# Patient Record
Sex: FEMALE | Race: WHITE | NOT HISPANIC OR LATINO | Employment: FULL TIME | URBAN - METROPOLITAN AREA
[De-identification: names, ages, dates, MRNs, and addresses within clinical notes are randomized per-mention and may not be internally consistent; named-entity substitution may affect disease eponyms.]

---

## 2017-01-09 NOTE — PROGRESS NOTES
"  SUBJECTIVE:                                                    Shannon Olivarez is a 31 year old female who presents to clinic today for the following health issues:    ESTABLISH CARE     HPI    Medication Followup of Oxybutnin    Taking Medication as prescribed: yes    Side Effects:  constipation    Medication Helping Symptoms:  yes       Problem list and histories reviewed & adjusted, as indicated.  Additional history: as documented    Problem list, Medication list, Allergies, and Medical/Social/Surgical histories reviewed in EPIC and updated as appropriate.    ROS:  Constitutional, HEENT, cardiovascular, pulmonary, gi and gu systems are negative, except as otherwise noted.    OBJECTIVE:                                                    BP 96/62 mmHg  Pulse 80  Temp(Src) 98.3  F (36.8  C) (Oral)  Resp 16  Ht 5' 5.47\" (1.663 m)  Wt 130 lb (58.968 kg)  BMI 21.32 kg/m2  SpO2 97%  Body mass index is 21.32 kg/(m^2).  GENERAL APPEARANCE: healthy, alert and no distress  EYES: Eyes grossly normal to inspection, PERRLA, conjunctivae and sclerae without injection or discharge, EOM intact   MS: No musculoskeletal defects are noted and gait is age appropriate without ataxia   SKIN: No suspicious lesions or rashes, hydration status appears adeuqate with normal skin turgor   PSYCH: Alert and oriented x3; speech- coherent , normal rate and volume; able to articulate logical thoughts, able to abstract reason, no tangential thoughts, no hallucinations or delusions, mentation appears normal, Mood is euthymic. Affect is appropriate for this mood state and bright. Thought content is free of suicidal ideation, hallucinations, and delusions. Dress is adequate and upkept. Eye contact is good during conversation.       Diagnostic Test Results:  none      ASSESSMENT/PLAN:                                                        ICD-10-CM    1. Urinary frequency R35.0 oxybutynin (DITROPAN-XL) 5 MG 24 hr tablet   2. Seasonal allergic " rhinitis due to pollen J30.1 montelukast (SINGULAIR) 10 MG tablet     - Both stable   - Reviewed medication use and side effects  - Will plan for recheck in 1 year with physical, discussed can do this with GYN or with Family Practice     The patient indicates understanding of these issues and agrees with the plan.    Follow up: 1 year       Naila Kc PA-C  [facility]

## 2017-01-12 ENCOUNTER — OFFICE VISIT (OUTPATIENT)
Dept: FAMILY MEDICINE | Facility: OTHER | Age: 32
End: 2017-01-12
Payer: COMMERCIAL

## 2017-01-12 VITALS
OXYGEN SATURATION: 97 % | DIASTOLIC BLOOD PRESSURE: 62 MMHG | HEART RATE: 80 BPM | SYSTOLIC BLOOD PRESSURE: 96 MMHG | HEIGHT: 65 IN | TEMPERATURE: 98.3 F | BODY MASS INDEX: 21.66 KG/M2 | WEIGHT: 130 LBS | RESPIRATION RATE: 16 BRPM

## 2017-01-12 DIAGNOSIS — R35.0 URINARY FREQUENCY: Primary | ICD-10-CM

## 2017-01-12 DIAGNOSIS — J30.1 SEASONAL ALLERGIC RHINITIS DUE TO POLLEN: ICD-10-CM

## 2017-01-12 PROCEDURE — 99214 OFFICE O/P EST MOD 30 MIN: CPT | Performed by: PHYSICIAN ASSISTANT

## 2017-01-12 RX ORDER — MONTELUKAST SODIUM 10 MG/1
10 TABLET ORAL AT BEDTIME
Qty: 90 TABLET | Refills: 3 | Status: SHIPPED | OUTPATIENT
Start: 2017-01-12 | End: 2017-12-07

## 2017-01-12 RX ORDER — OXYBUTYNIN CHLORIDE 5 MG/1
5 TABLET, EXTENDED RELEASE ORAL DAILY
Qty: 90 TABLET | Refills: 3 | Status: SHIPPED | OUTPATIENT
Start: 2017-01-12 | End: 2017-12-07

## 2017-01-12 ASSESSMENT — PAIN SCALES - GENERAL: PAINLEVEL: NO PAIN (0)

## 2017-01-12 NOTE — NURSING NOTE
"Chief Complaint   Patient presents with     Recheck Medication     oxybutnin     Panel Management     Ana, height       Initial BP 96/62 mmHg  Pulse 80  Temp(Src) 98.3  F (36.8  C) (Oral)  Resp 16  Ht 5' 5.47\" (1.663 m)  Wt 130 lb (58.968 kg)  BMI 21.32 kg/m2  SpO2 97% Estimated body mass index is 21.32 kg/(m^2) as calculated from the following:    Height as of this encounter: 5' 5.47\" (1.663 m).    Weight as of this encounter: 130 lb (58.968 kg).  BP completed using cuff size: regular  "

## 2017-03-23 ENCOUNTER — OFFICE VISIT (OUTPATIENT)
Dept: OBGYN | Facility: OTHER | Age: 32
End: 2017-03-23
Payer: COMMERCIAL

## 2017-03-23 VITALS
HEART RATE: 80 BPM | WEIGHT: 127.75 LBS | DIASTOLIC BLOOD PRESSURE: 82 MMHG | BODY MASS INDEX: 20.95 KG/M2 | SYSTOLIC BLOOD PRESSURE: 114 MMHG | RESPIRATION RATE: 16 BRPM

## 2017-03-23 DIAGNOSIS — N89.8 VAGINAL ITCHING: Primary | ICD-10-CM

## 2017-03-23 PROCEDURE — 99213 OFFICE O/P EST LOW 20 MIN: CPT | Performed by: ADVANCED PRACTICE MIDWIFE

## 2017-03-23 RX ORDER — FLUCONAZOLE 150 MG/1
150 TABLET ORAL
Qty: 3 TABLET | Refills: 0 | Status: SHIPPED | OUTPATIENT
Start: 2017-03-23 | End: 2017-06-22

## 2017-03-23 ASSESSMENT — PAIN SCALES - GENERAL: PAINLEVEL: SEVERE PAIN (6)

## 2017-03-23 NOTE — PROGRESS NOTES
Shannon Olivarez is a 31 year old who presents to the clinic for evaluation of vaginal changes      Vaginal Symptoms      Onset  For approx 1 month Duration: consistent. number of episodes of vaginitis in the past year 0    Description  Burning intravaginally and pain with intercourse x1 on Sunday    Intensity:  moderate    Accompanying signs and symptoms (fever/dysuria/abdominal or back pain): None    History  Sexually active: yes, single partner, contraception - oral contraceptives (combined)  Currently pregnant: no  Possibility of pregnancy: No  Recent antibiotic use: no  Diabetes: no  Precipitating or alleviating factors:None    Therapies tried and outcome: Vagisil has used since Monday until yesterday  Outcome: unchanged         Histories reviewed and updated  Past Medical History:   Diagnosis Date     Acne clear    sees derm Dr. Raygoza     Allergic rhinitis, cause unspecified     dr. joel GIBSON (allergic rhinitis)      CARDIOVASCULAR SCREENING; LDL GOAL LESS THAN 160 10/31/2010     High cholesterol      Hypertension 10/9/14    High blood pressure again noted at Urgent Care 12/13/14     Melanoma of scalp or neck (H)      Spina bifida (H)     seen on xray when in college     Past Surgical History:   Procedure Laterality Date     NO HISTORY OF SURGERY       Social History     Social History     Marital status:      Spouse name: N/A     Number of children: N/A     Years of education: N/A     Occupational History     Not on file.     Social History Main Topics     Smoking status: Never Smoker     Smokeless tobacco: Never Used     Alcohol use Yes     Drug use: No     Sexual activity: Yes     Partners: Male     Birth control/ protection: Pill     Other Topics Concern     Parent/Sibling W/ Cabg, Mi Or Angioplasty Before 65f 55m? No     Social History Narrative     Family History   Problem Relation Age of Onset     Allergies Mother      Asthma Mother      Allergies Father      Prostate Cancer Father       CANCER Maternal Grandfather      skin     HEART DISEASE Maternal Grandfather      Cardiovascular Maternal Grandfather      Lipids Maternal Grandfather      Other Cancer Maternal Grandfather      Lymphoma     Unknown/Adopted Paternal Grandmother      Unknown/Adopted Paternal Grandfather      Asthma Brother      Allergies Brother      Hypertension Maternal Grandmother      CEREBROVASCULAR DISEASE Maternal Grandmother      Breast Cancer Other      Paternal Aunt     Breast Cancer Other      Paternal Aunt     MENTAL ILLNESS Other      Maternal Uncle - Schizophrenia     Asthma Brother            CONSTITUTIONAL: Healthy, alert, in no apparent distress.  GI: NEGATIVE  : See HPI  MUSCULOSKELETAL: NEGATIVE  INTEGUMENTARY/SKIN: NEGATIVE  ENDOCRINE: NEGATIVE  PSYCHIATRIC: NEGATIVE    EXAM:  /82 (Cuff Size: Adult Regular)  Pulse 80  Resp 16  Wt 127 lb 12 oz (57.9 kg)  BMI 20.95 kg/m2  Patient appears well, vital signs normal.   Abdomen normal, soft without tenderness, guarding, mass or organomegaly.  No inguinal adenopathy or CVA tenderness.    : PELVIC EXAM:  Vulva: No external lesions, normal hair distribution, no adenopathy, BUS WNL  Vagina: Moist, pink, no abnormal discharge, well rugated, no lesions  Cervix: smooth, pink, no visible lesions, neg CMT  Rectal exam: deferred    WET PREP: Not done  Due to use recently of vagisil   GC and Chlamydia offered: Not Done    ASSESSMENT:     (L29.8) Vaginal itching  (primary encounter diagnosis)  Plan: fluconazole (DIFLUCAN) 150 MG tablet          PLAN:  Discussed that signs/symptoms probable indicative of yeast infection. Advised patient if signs/symptoms do not improve after treatment to return to clinic. Pt educated on Vagisil use and possibility of contact dermatitis.   STD prevention discussed.   Birth control method reviewed.  Abstain from intercourse for duration of treatment.  Return if symptoms do not resolve as anticipated.      Scribe Disclosure:   Magda OSHEA  SUGEY Shafer, am serving as a scribe; to document services personally performed by LIONEL Somers CNM  - -based on data collection and the provider's statements to me.     Provider Disclosure:  I agree with above History, Review of Systems, Physical exam and Plan.  I have reviewed the content of the documentation and have edited it as needed. I have personally performed the services documented here and the documentation accurately represents those services and the decisions I have made.      Electronically signed by:  LIONEL Somers CNM'

## 2017-03-23 NOTE — MR AVS SNAPSHOT
After Visit Summary   3/23/2017    Shannon Olivarez    MRN: [id number]           Patient Information     Date Of Birth          1985        Visit Information        Provider Department      3/23/2017 12:00 PM Cely Lopez APRN [facility]        Today's Diagnoses     Vaginal itching    -  1      Care Instructions    Here is a list of suggestions that may help eliminate or prevent vaginal infections or reduce their recurrence.  Many of these suggestions:   1. boosting your immune system  2. changing the vaginal environment to a more acidic state   3. increasing the good healthy bacteria    Read through them and try the ones that seem to fit you and your life style.    Soak in a warm bath tub, no soap, no bubble bath and no oils.  Add one cup vinegar or lemon juice to bath water once in a while,     Keep a water bottle with a squirt top in your bathroom, fill with warm water and use as a spray after wiping, then pat dry.  You may add 1-3 TBS of white vinegar to help maintain an acidic environment.    Wear cotton underwear; loose pants or skirts, avoid pantyhose and thong underwear.    Try to avoid wearing underware to bed so that your vaginal area can breathe and stay drier.    Keep vaginal area as dry as possible so don't sit for long periods of time in workout clothing or wet bathing suits.     Consider using either male or female condoms or asking your partner not to ejaculate in your vagina.    To boost your immune system consider the followin. Sleep:7-8 hours a night  2. Fluids: get a minimum of 8-10 glasses of water a day  3. Foods: try reducing processed foods in your diet and add whole grains, raw vegetables, fruit, and yogurt that contains active culture or kefir  4. Consider taking a vitamin B complex tablet, sometimes called stress tabs, Vitamin D 1000mg a day, or cranberry tablets.    5.  Consider the stress in your life and try to reduce it through yoga or  meditation.    Decrease daily intake of refined sugars, honey, red meat and alcohol    At each meal drink 1tsp apple cider vinegar and 1 tsp honey in   cup warm water    Consider probiotic tablets to restore normal bacteria back into your system    Boric acid capsules, insert 2 capsules  00  daily into vagina every 3 days if you have chronic problems with yeast or bacterial vaginosis.    If your symptoms do not resolve or if you have questions please call the clinic at [phone] for Lavinia or [phone] for Janie or send a message through My Chart.           Follow-ups after your visit        Follow-up notes from your care team     Return if symptoms worsen or fail to improve.      Who to contact     If you have questions or need follow up information about today's clinic visit or your schedule please contact [facility] directly at [phone].  Normal or non-critical lab and imaging results will be communicated to you by [employer], letter or phone within 4 business days after the clinic has received the results. If you do not hear from us within 7 days, please contact the clinic through [employer] or phone. If you have a critical or abnormal lab result, we will notify you by phone as soon as possible.  Submit refill requests through [employer] or call your pharmacy and they will forward the refill request to us. Please allow 3 business days for your refill to be completed.          Additional Information About Your Visit        [employer] Information     [employer] gives you secure access to your electronic health record. If you see a primary care provider, you can also send messages to your care team and make appointments. If you have questions, please call your primary care clinic.  If you do not have a primary care provider, please call [phone] and they will assist you.        Care EveryWhere ID     This is your Care EveryWhere ID. This could be used by other organizations to access your  [facility] medical records  [id number]        Your Vitals Were     Pulse Respirations BMI (Body Mass Index)             80 16 20.95 kg/m2          Blood Pressure from Last 3 Encounters:   03/23/17 114/82   01/12/17 96/62   12/01/16 102/68    Weight from Last 3 Encounters:   03/23/17 127 lb 12 oz (57.9 kg)   01/12/17 130 lb (59 kg)   12/01/16 129 lb 8 oz (58.7 kg)              Today, you had the following     No orders found for display         Today's Medication Changes          These changes are accurate as of: 3/23/17  2:02 PM.  If you have any questions, ask your nurse or doctor.               Start taking these medicines.        Dose/Directions    fluconazole 150 MG tablet   Commonly known as:  DIFLUCAN   Used for:  Vaginal itching   Started by:  Cely Lopez APRN CNKENNEDY        Dose:  150 mg   Take 1 tablet (150 mg) by mouth every 3 days   Quantity:  3 tablet   Refills:  0            Where to get your medicines      These medications were sent to [facility] [place] [address]     Phone:  [phone]     fluconazole 150 MG tablet                Primary Care Provider Office Phone # Fax #    Naila Kc PA-C [phone]       [address]  [facility] [address]        Thank you!     Thank you for choosing [facility]  for your care. Our goal is always to provide you with excellent care. Hearing back from our patients is one way we can continue to improve our services. Please take a few minutes to complete the written survey that you may receive in the mail after your visit with us. Thank you!             Your Updated Medication List - Protect others around you: Learn how to safely use, store and throw away your medicines at www.disposemymeds.org.          This list is accurate as of: 3/23/17  2:02 PM.  Always use your most recent med list.                   Brand  Name Dispense Instructions for use    beclomethasone 80 MCG/ACT Inhaler    QVAR    1 Inhaler    2 puffs into nasal passages once daily with adapter       FIBER PO      Take by mouth daily as needed       fluconazole 150 MG tablet    DIFLUCAN    3 tablet    Take 1 tablet (150 mg) by mouth every 3 days       ibuprofen 200 MG tablet    ADVIL/MOTRIN     Take 2 tablets by mouth 2 times daily as needed.       montelukast 10 MG tablet    SINGULAIR    90 tablet    Take 1 tablet (10 mg) by mouth At Bedtime       Multi-vitamin Tabs tablet   Generic drug:  multivitamin, therapeutic with minerals      Take 1 tablet by mouth daily.       NASACORT ALLERGY 24HR 55 MCG/ACT Inhaler   Generic drug:  triamcinolone      Spray 1 spray into both nostrils daily       norgestim-eth estrad triphasic 0.18/0.215/0.25 MG-35 MCG per tablet    ORTHO TRI-CYCLEN (28)    84 tablet    Take 1 tablet by mouth daily       oxybutynin 5 MG 24 hr tablet    DITROPAN-XL    90 tablet    Take 1 tablet (5 mg) by mouth daily

## 2017-03-23 NOTE — PATIENT INSTRUCTIONS
Here is a list of suggestions that may help eliminate or prevent vaginal infections or reduce their recurrence.  Many of these suggestions:   1. boosting your immune system  2. changing the vaginal environment to a more acidic state   3. increasing the good healthy bacteria    Read through them and try the ones that seem to fit you and your life style.    Soak in a warm bath tub, no soap, no bubble bath and no oils.  Add one cup vinegar or lemon juice to bath water once in a while,     Keep a water bottle with a squirt top in your bathroom, fill with warm water and use as a spray after wiping, then pat dry.  You may add 1-3 TBS of white vinegar to help maintain an acidic environment.    Wear cotton underwear; loose pants or skirts, avoid pantyhose and thong underwear.    Try to avoid wearing underware to bed so that your vaginal area can breathe and stay drier.    Keep vaginal area as dry as possible so don't sit for long periods of time in workout clothing or wet bathing suits.     Consider using either male or female condoms or asking your partner not to ejaculate in your vagina.    To boost your immune system consider the followin. Sleep:7-8 hours a night  2. Fluids: get a minimum of 8-10 glasses of water a day  3. Foods: try reducing processed foods in your diet and add whole grains, raw vegetables, fruit, and yogurt that contains active culture or kefir  4. Consider taking a vitamin B complex tablet, sometimes called stress tabs, Vitamin D 1000mg a day, or cranberry tablets.    5.  Consider the stress in your life and try to reduce it through yoga or meditation.    Decrease daily intake of refined sugars, honey, red meat and alcohol    At each meal drink 1tsp apple cider vinegar and 1 tsp honey in   cup warm water    Consider probiotic tablets to restore normal bacteria back into your system    Boric acid capsules, insert 2 capsules  00  daily into vagina every 3 days if you have chronic problems with  yeast or bacterial vaginosis.    If your symptoms do not resolve or if you have questions please call the clinic at [phone] for Preet Ramirez or [phone] for Janie or send a message through My Chart.

## 2017-06-20 ENCOUNTER — MYC MEDICAL ADVICE (OUTPATIENT)
Dept: FAMILY MEDICINE | Facility: OTHER | Age: 32
End: 2017-06-20

## 2017-06-22 ENCOUNTER — OFFICE VISIT (OUTPATIENT)
Dept: FAMILY MEDICINE | Facility: OTHER | Age: 32
End: 2017-06-22
Payer: COMMERCIAL

## 2017-06-22 VITALS
DIASTOLIC BLOOD PRESSURE: 76 MMHG | WEIGHT: 128 LBS | TEMPERATURE: 98.3 F | SYSTOLIC BLOOD PRESSURE: 102 MMHG | BODY MASS INDEX: 20.99 KG/M2 | HEART RATE: 94 BPM | RESPIRATION RATE: 12 BRPM | OXYGEN SATURATION: 99 %

## 2017-06-22 DIAGNOSIS — R11.2 NON-INTRACTABLE VOMITING WITH NAUSEA, UNSPECIFIED VOMITING TYPE: ICD-10-CM

## 2017-06-22 DIAGNOSIS — R11.0 NAUSEA: Primary | ICD-10-CM

## 2017-06-22 LAB
BASOPHILS # BLD AUTO: 0 10E9/L (ref 0–0.2)
BASOPHILS NFR BLD AUTO: 0.4 %
DIFFERENTIAL METHOD BLD: NORMAL
EOSINOPHIL # BLD AUTO: 0.1 10E9/L (ref 0–0.7)
EOSINOPHIL NFR BLD AUTO: 2 %
ERYTHROCYTE [DISTWIDTH] IN BLOOD BY AUTOMATED COUNT: 11.9 % (ref 10–15)
HCT VFR BLD AUTO: 42.5 % (ref 35–47)
HGB BLD-MCNC: 14.5 G/DL (ref 11.7–15.7)
LYMPHOCYTES # BLD AUTO: 1.8 10E9/L (ref 0.8–5.3)
LYMPHOCYTES NFR BLD AUTO: 32.9 %
MCH RBC QN AUTO: 30.2 PG (ref 26.5–33)
MCHC RBC AUTO-ENTMCNC: 34.1 G/DL (ref 31.5–36.5)
MCV RBC AUTO: 89 FL (ref 78–100)
MONOCYTES # BLD AUTO: 0.5 10E9/L (ref 0–1.3)
MONOCYTES NFR BLD AUTO: 8.8 %
NEUTROPHILS # BLD AUTO: 3.1 10E9/L (ref 1.6–8.3)
NEUTROPHILS NFR BLD AUTO: 55.9 %
PLATELET # BLD AUTO: 259 10E9/L (ref 150–450)
RBC # BLD AUTO: 4.8 10E12/L (ref 3.8–5.2)
WBC # BLD AUTO: 5.5 10E9/L (ref 4–11)

## 2017-06-22 PROCEDURE — 99214 OFFICE O/P EST MOD 30 MIN: CPT | Performed by: PHYSICIAN ASSISTANT

## 2017-06-22 PROCEDURE — 36415 COLL VENOUS BLD VENIPUNCTURE: CPT | Performed by: PHYSICIAN ASSISTANT

## 2017-06-22 PROCEDURE — 85025 COMPLETE CBC W/AUTO DIFF WBC: CPT | Performed by: PHYSICIAN ASSISTANT

## 2017-06-22 ASSESSMENT — PAIN SCALES - GENERAL: PAINLEVEL: MILD PAIN (3)

## 2017-06-22 NOTE — NURSING NOTE
"Chief Complaint   Patient presents with     Vomiting     reccurent stomach flu       Initial /76 (BP Location: Left arm, Patient Position: Chair, Cuff Size: Adult Regular)  Pulse 94  Temp 98.3  F (36.8  C) (Oral)  Resp 12  Wt 128 lb (58.1 kg)  SpO2 99%  BMI 20.99 kg/m2 Estimated body mass index is 20.99 kg/(m^2) as calculated from the following:    Height as of 1/12/17: 5' 5.47\" (1.663 m).    Weight as of this encounter: 128 lb (58.1 kg).  Medication Reconciliation: complete  "

## 2017-06-22 NOTE — PROGRESS NOTES
Mavis Ortega    Your results were normal.     The results are attached for your review.       Javier Kc PA-C

## 2017-06-22 NOTE — PROGRESS NOTES
SUBJECTIVE:                                                    Shannon Olivarez is a 31 year old female who presents to clinic today for the following health issues:      HPI    Concern - stomach flu/vomitting     Onset: beginning of this year    Description:   2 times in January, and again this past week, vomiting, nausea, headaches - vomiting one day, nausea on and off for 5 days after    Intensity: moderate    Progression of Symptoms:  Off and on    Accompanying Signs & Symptoms:  none    - Just starting to feel better with most recent episode (started on Monday)   - Works at a vet clinic     - No diarrhea, no constipation      Maybe less volume due to eating   - Nausea, vomiting, stomach pain   - Hurts usually upper left abdomen   - Crampy pain, comes and goes   - No medications     - Prepare meals at home, spaghetti, stir-dietz, hamburger helper  - Packed lunches   - cooked meat all the way   - 1 dog (in December), 2 cats, 3 birds     Previous history of similar problem:   none    Precipitating factors:   Worsened by: no    Alleviating factors:  Improved by: none      - Last around 1 week, takes another week before can eat (bland)   -  gets it at the same     - Vomiting only the first day, can only keep Pedialyte down   - On and off nausea     - Always him sick first     - Well water, just got a whole house filter, was very plugged quickly, just put in a new filter     - [facility] last year in August was last travel      Therapies Tried and outcome: Pedialyte soothes stomach       Problem list and histories reviewed & adjusted, as indicated.  Additional history: as documented    ROS:  Constitutional, HEENT, cardiovascular, pulmonary, gi and gu systems are negative, except as otherwise noted.    OBJECTIVE:                                                    /76 (BP Location: Left arm, Patient Position: Chair, Cuff Size: Adult Regular)  Pulse 94  Temp 98.3  F (36.8  C) (Oral)  Resp 12  Wt 128 lb  (58.1 kg)  SpO2 99%  BMI 20.99 kg/m2  Body mass index is 20.99 kg/(m^2).  GENERAL APPEARANCE: healthy, alert and no distress  EYES: Eyes grossly normal to inspection, PERRLA, conjunctivae and sclerae without injection or discharge, EOM intact   RESP: Lungs clear to auscultation - no rales, rhonchi or wheezes   CV: Regular rates and rhythm, normal S1 S2, no S3 or S4, no murmur, click or rub  ABDOMEN: Soft, nontender, no hepatosplenomegaly, no masses and bowel sounds normal   MS: No musculoskeletal defects are noted and gait is age appropriate without ataxia   SKIN: No suspicious lesions or rashes, hydration status appears adeuqate with normal skin turgor   PSYCH: Alert and oriented x3; speech- coherent , normal rate and volume; able to articulate logical thoughts, able to abstract reason, no tangential thoughts, no hallucinations or delusions, mentation appears normal, Mood is euthymic. Affect is appropriate for this mood state and bright. Thought content is free of suicidal ideation, hallucinations, and delusions. Dress is adequate and upkept. Eye contact is good during conversation.       Diagnostic Test Results:  none      ASSESSMENT/PLAN:                                                        ICD-10-CM    1. Nausea R11.0 Ova and Parasite Exam Routine     Enteric Bacteria and Virus Panel by [facility] Stool     Giardia antigen     H Pylori antigen, stool     Rotavirus A by PCR Stool     Fecal Lactoferrin     CBC with platelets and differential   2. Non-intractable vomiting with nausea, unspecified vomiting type R11.2 Ova and Parasite Exam Routine     Enteric Bacteria and Virus Panel by [facility] Stool     Giardia antigen     H Pylori antigen, stool     Rotavirus A by PCR Stool     Fecal Lactoferrin     CBC with platelets and differential     - Discussed with patient complex history, especially with  involvement, question food not properly prepared vs parasite vs. Bacterial vs. Virus vs. Other   - Recommend stool  sampling, should also have her dog checked to make sure is clear from any infection   - If all negative, will plan on having her keep a food diary to see if certaine foods they are not preparing properly is causing things     The patient indicates understanding of these issues and agrees with the plan.    Follow up: pending labs         Naila Kc PA-C  [facility]

## 2017-06-22 NOTE — MR AVS SNAPSHOT
After Visit Summary   6/22/2017    Shannon Olivarez    MRN: [id number]           Patient Information     Date Of Birth          1985        Visit Information        Provider Department      6/22/2017 4:15 PM Naila Kc PA-C [facility]        Today's Diagnoses     Nausea    -  1    Non-intractable vomiting with nausea, unspecified vomiting type          Care Instructions    - Stool cultures and blood today   - Await results                   Follow-ups after your visit        Future tests that were ordered for you today     Open Future Orders        Priority Expected Expires Ordered    Ova and Parasite Exam Routine Routine  6/22/2018 6/22/2017    Enteric Bacteria and Virus Panel by ROSEY Stool Routine  6/22/2018 6/22/2017    Giardia antigen Routine  6/22/2018 6/22/2017    H Pylori antigen, stool Routine  7/22/2017 6/22/2017    Rotavirus A by PCR Stool Routine  6/22/2018 6/22/2017    Fecal Lactoferrin Routine  6/22/2018 6/22/2017            Who to contact     If you have questions or need follow up information about today's clinic visit or your schedule please contact [facility] directly at [phone].  Normal or non-critical lab and imaging results will be communicated to you by MyChart, letter or phone within 4 business days after the clinic has received the results. If you do not hear from us within 7 days, please contact the clinic through [employer] or phone. If you have a critical or abnormal lab result, we will notify you by phone as soon as possible.  Submit refill requests through [employer] or call your pharmacy and they will forward the refill request to us. Please allow 3 business days for your refill to be completed.          Additional Information About Your Visit        MyChart Information     [employer] gives you secure access to your electronic health record. If you see a primary care provider, you can also send messages to your care team  and make appointments. If you have questions, please call your primary care clinic.  If you do not have a primary care provider, please call [phone] and they will assist you.        Care EveryWhere ID     This is your Care EveryWhere ID. This could be used by other organizations to access your [place] medical records  [id number]        Your Vitals Were     Pulse Temperature Respirations Pulse Oximetry BMI (Body Mass Index)       94 98.3  F (36.8  C) (Oral) 12 99% 20.99 kg/m2        Blood Pressure from Last 3 Encounters:   06/22/17 102/76   03/23/17 114/82   01/12/17 96/62    Weight from Last 3 Encounters:   06/22/17 128 lb (58.1 kg)   03/23/17 127 lb 12 oz (57.9 kg)   01/12/17 130 lb (59 kg)              We Performed the Following     CBC with platelets and differential        Primary Care Provider Office Phone # Fax #    Naila CARTER Kc PA-C [phone]       [facility] [address]  [facility] [address]        Equal Access to Services     JHOANA PATEL : Hadii aad ku hadasho Soomaali, waaxda luqadaha, qaybta kaalmada adeegyada, herb garibay . So [facility] [phone].    ATENCIÓN: Si habla español, tiene a shankar disposición servicios gratuitos de asistencia lingüística. [facility] [phone].    We comply with applicable federal civil rights laws and [place] laws. We do not discriminate on the basis of race, color, national origin, age, disability sex, sexual orientation or gender identity.            Thank you!     Thank you for choosing [facility]  for your care. Our goal is always to provide you with excellent care. Hearing back from our patients is one way we can continue to improve our services. Please take a few minutes to complete the written survey that you may receive in the mail after your visit with us. Thank you!             Your Updated Medication List - Protect others around you: Learn how to safely  use, store and throw away your medicines at www.disposemymeds.org.          This list is accurate as of: 6/22/17  4:59 PM.  Always use your most recent med list.                   Brand Name Dispense Instructions for use Diagnosis    beclomethasone 80 MCG/ACT Inhaler    QVAR    1 Inhaler    2 puffs into nasal passages once daily with adapter    Chronic maxillary sinusitis       FIBER PO      Take by mouth daily as needed        ibuprofen 200 MG tablet    ADVIL/MOTRIN     Take 2 tablets by mouth 2 times daily as needed.        montelukast 10 MG tablet    SINGULAIR    90 tablet    Take 1 tablet (10 mg) by mouth At Bedtime    Seasonal allergic rhinitis due to pollen       Multi-vitamin Tabs tablet   Generic drug:  multivitamin, therapeutic with minerals      Take 1 tablet by mouth daily.        NASACORT ALLERGY 24HR 55 MCG/ACT Inhaler   Generic drug:  triamcinolone      Spray 1 spray into both nostrils daily        norgestim-eth estrad triphasic 0.18/0.215/0.25 MG-35 MCG per tablet    ORTHO TRI-CYCLEN (28)    84 tablet    Take 1 tablet by mouth daily    Encounter for surveillance of contraceptive pills       oxybutynin 5 MG 24 hr tablet    DITROPAN-XL    90 tablet    Take 1 tablet (5 mg) by mouth daily    Urinary frequency

## 2017-06-25 PROCEDURE — 83630 LACTOFERRIN FECAL (QUAL): CPT | Performed by: PHYSICIAN ASSISTANT

## 2017-06-25 PROCEDURE — 87209 SMEAR COMPLEX STAIN: CPT | Performed by: PHYSICIAN ASSISTANT

## 2017-06-25 PROCEDURE — 87506 IADNA-DNA/RNA PROBE TQ 6-11: CPT | Performed by: PHYSICIAN ASSISTANT

## 2017-06-25 PROCEDURE — 87329 GIARDIA AG IA: CPT | Mod: 59 | Performed by: PHYSICIAN ASSISTANT

## 2017-06-25 PROCEDURE — 87177 OVA AND PARASITES SMEARS: CPT | Performed by: PHYSICIAN ASSISTANT

## 2017-06-25 PROCEDURE — 87338 HPYLORI STOOL AG IA: CPT | Performed by: PHYSICIAN ASSISTANT

## 2017-06-26 DIAGNOSIS — R11.0 NAUSEA: ICD-10-CM

## 2017-06-26 DIAGNOSIS — R11.2 NON-INTRACTABLE VOMITING WITH NAUSEA, UNSPECIFIED VOMITING TYPE: ICD-10-CM

## 2017-06-26 LAB
CAMPYLOBACTER GROUP BY NAT: NOT DETECTED
ENTERIC PATHOGEN COMMENT: NORMAL
LACTOFERRIN STL QL IA: NORMAL
NOROVIRUS I AND II BY NAT: NOT DETECTED
ROTAVIRUS A BY NAT: NOT DETECTED
SALMONELLA SPECIES BY NAT: NOT DETECTED
SHIGA TOXIN 1 GENE BY NAT: NOT DETECTED
SHIGA TOXIN 2 GENE BY NAT: NOT DETECTED
SHIGELLA SP+EIEC IPAH STL QL NAA+PROBE: NOT DETECTED
VIBRIO GROUP BY NAT: NOT DETECTED
YERSINIA ENTEROCOLITICA BY NAT: NOT DETECTED

## 2017-06-27 LAB
G LAMBLIA AG STL QL IA: NORMAL
H PYLORI AG STL QL IA: NORMAL
MICRO REPORT STATUS: NORMAL
O+P STL MICRO: NORMAL
SPECIMEN SOURCE: NORMAL

## 2017-06-27 NOTE — PROGRESS NOTES
Mavis Shannon    Your results were normal. I recommend at this point that you keep a food journal with each meal ingredients and monitor if the symptoms happen again to see if this is food/food prep related.     The results are attached for your review.       Javier Kc PA-C

## 2017-09-28 ENCOUNTER — OFFICE VISIT (OUTPATIENT)
Dept: OBGYN | Facility: OTHER | Age: 32
End: 2017-09-28
Payer: COMMERCIAL

## 2017-09-28 VITALS
BODY MASS INDEX: 22.51 KG/M2 | DIASTOLIC BLOOD PRESSURE: 82 MMHG | HEART RATE: 84 BPM | WEIGHT: 137.25 LBS | SYSTOLIC BLOOD PRESSURE: 124 MMHG

## 2017-09-28 DIAGNOSIS — Z12.4 SCREENING FOR MALIGNANT NEOPLASM OF CERVIX: ICD-10-CM

## 2017-09-28 DIAGNOSIS — R30.0 DYSURIA: Primary | ICD-10-CM

## 2017-09-28 DIAGNOSIS — Z23 NEED FOR PROPHYLACTIC VACCINATION AND INOCULATION AGAINST INFLUENZA: ICD-10-CM

## 2017-09-28 DIAGNOSIS — N89.8 VAGINAL ITCHING: ICD-10-CM

## 2017-09-28 LAB
ALBUMIN UR-MCNC: NEGATIVE MG/DL
APPEARANCE UR: CLEAR
BILIRUB UR QL STRIP: NEGATIVE
COLOR UR AUTO: YELLOW
GLUCOSE UR STRIP-MCNC: NEGATIVE MG/DL
HGB UR QL STRIP: NEGATIVE
KETONES UR STRIP-MCNC: NEGATIVE MG/DL
LEUKOCYTE ESTERASE UR QL STRIP: NEGATIVE
NITRATE UR QL: NEGATIVE
PH UR STRIP: 7 PH (ref 5–7)
SOURCE: NORMAL
SP GR UR STRIP: 1.01 (ref 1–1.03)
SPECIMEN SOURCE: NORMAL
UROBILINOGEN UR STRIP-ACNC: 0.2 EU/DL (ref 0.2–1)
WET PREP SPEC: NORMAL

## 2017-09-28 PROCEDURE — G0145 SCR C/V CYTO,THINLAYER,RESCR: HCPCS | Performed by: ADVANCED PRACTICE MIDWIFE

## 2017-09-28 PROCEDURE — 99213 OFFICE O/P EST LOW 20 MIN: CPT | Mod: 25 | Performed by: ADVANCED PRACTICE MIDWIFE

## 2017-09-28 PROCEDURE — 81003 URINALYSIS AUTO W/O SCOPE: CPT | Performed by: ADVANCED PRACTICE MIDWIFE

## 2017-09-28 PROCEDURE — 87210 SMEAR WET MOUNT SALINE/INK: CPT | Performed by: ADVANCED PRACTICE MIDWIFE

## 2017-09-28 PROCEDURE — 90686 IIV4 VACC NO PRSV 0.5 ML IM: CPT | Performed by: ADVANCED PRACTICE MIDWIFE

## 2017-09-28 PROCEDURE — 90471 IMMUNIZATION ADMIN: CPT | Performed by: ADVANCED PRACTICE MIDWIFE

## 2017-09-28 PROCEDURE — 87624 HPV HI-RISK TYP POOLED RSLT: CPT | Performed by: ADVANCED PRACTICE MIDWIFE

## 2017-09-28 RX ORDER — FLUCONAZOLE 150 MG/1
150 TABLET ORAL
Qty: 3 TABLET | Refills: 0 | Status: SHIPPED | OUTPATIENT
Start: 2017-09-28 | End: 2017-12-07

## 2017-09-28 NOTE — PATIENT INSTRUCTIONS
Here is a list of suggestions that may help eliminate or prevent vaginal infections or reduce their recurrence.  Many of these suggestions:   1. boosting your immune system  2. changing the vaginal environment to a more acidic state   3. increasing the good healthy bacteria    Read through them and try the ones that seem to fit you and your life style.    Soak in a warm bath tub, no soap, no bubble bath and no oils.  Add one cup vinegar or lemon juice to bath water once in a while,     Keep a water bottle with a squirt top in your bathroom, fill with warm water and use as a spray after wiping, then pat dry.  You may add 1-3 TBS of white vinegar to help maintain an acidic environment.    Wear cotton underwear; loose pants or skirts, avoid pantyhose and thong underwear.    Try to avoid wearing underware to bed so that your vaginal area can breathe and stay drier.    Keep vaginal area as dry as possible so don't sit for long periods of time in workout clothing or wet bathing suits.     Consider using either male or female condoms or asking your partner not to ejaculate in your vagina.    To boost your immune system consider the followin. Sleep:7-8 hours a night  2. Fluids: get a minimum of 8-10 glasses of water a day  3. Foods: try reducing processed foods in your diet and add whole grains, raw vegetables, fruit, and yogurt that contains active culture or kefir  4. Consider taking a vitamin B complex tablet, sometimes called stress tabs, Vitamin D 1000mg a day, or cranberry tablets.    5.  Consider the stress in your life and try to reduce it through yoga or meditation.    Decrease daily intake of refined sugars, honey, red meat and alcohol    At each meal drink 1tsp apple cider vinegar and 1 tsp honey in   cup warm water    Consider probiotic tablets to restore normal bacteria back into your system    Boric acid capsules, insert 2 capsules  00  daily into vagina every 3 days if you have chronic problems with  yeast or bacterial vaginosis.    If your symptoms do not resolve or if you have questions please call the clinic at [phone] for Preet Ramirez or [phone] for [place] or send a message through My Chart.

## 2017-09-28 NOTE — PROGRESS NOTES
Shannon Olivarez is a 31 year old who presents to the clinic for evaluation of vaginal changes      Vaginal Symptoms      Onset 1-2 weeks number of episodes of vaginitis in the past year 1    Description  itching and burning with urination    Intensity:  moderate    Accompanying signs and symptoms (fever/dysuria/abdominal or back pain): None    History  Sexually active: yes, single partner, contraception - oral contraceptives (combined)  Currently pregnant: no  Possibility of pregnancy: No  Recent antibiotic use: no  Diabetes: no  Precipitating or alleviating factors: None    Therapies tried and outcome: none   Outcome: unchanged         Histories reviewed and updated  Past Medical History:   Diagnosis Date     Acne clear    sees derm Dr. Raygoza     Allergic rhinitis, cause unspecified     dr. joel GIBSON (allergic rhinitis)      CARDIOVASCULAR SCREENING; LDL GOAL LESS THAN 160 10/31/2010     High cholesterol      Hypertension 10/9/14    High blood pressure again noted at Urgent Care 12/13/14     Melanoma of scalp or neck (H)      Spina bifida (H)     seen on xray when in college     Past Surgical History:   Procedure Laterality Date     NO HISTORY OF SURGERY       Social History     Social History     Marital status:      Spouse name: N/A     Number of children: N/A     Years of education: N/A     Occupational History     Not on file.     Social History Main Topics     Smoking status: Never Smoker     Smokeless tobacco: Never Used     Alcohol use Yes     Drug use: No     Sexual activity: Yes     Partners: Male     Birth control/ protection: Pill     Other Topics Concern     Parent/Sibling W/ Cabg, Mi Or Angioplasty Before 65f 55m? No     Social History Narrative     Family History   Problem Relation Age of Onset     Allergies Mother      Asthma Mother      Allergies Father      Prostate Cancer Father      CANCER Maternal Grandfather      skin     HEART DISEASE Maternal Grandfather      Cardiovascular  Maternal Grandfather      Lipids Maternal Grandfather      Other Cancer Maternal Grandfather      Lymphoma     Unknown/Adopted Paternal Grandmother      Unknown/Adopted Paternal Grandfather      Asthma Brother      Allergies Brother      Hypertension Maternal Grandmother      CEREBROVASCULAR DISEASE Maternal Grandmother      Breast Cancer Other      Paternal Aunt     Breast Cancer Other      Paternal Aunt     MENTAL ILLNESS Other      Maternal Uncle - Schizophrenia     Asthma Brother            CONSTITUTIONAL: Healthy, alert, in no apparent distress.  GI: NEGATIVE  : NEGATIVE    EXAM:  /82 (BP Location: Right arm, Patient Position: Chair, Cuff Size: Adult Regular)  Pulse 84  Wt 137 lb 4 oz (62.3 kg)  LMP 09/04/2017 (Exact Date)  BMI 22.51 kg/m2  Patient appears well, vital signs normal.   Abdomen normal, soft without tenderness, guarding, mass or organomegaly.  No inguinal adenopathy or CVA tenderness.    : PELVIC EXAM:  Vulva: No external lesions, normal hair distribution, no adenopathy, BUS WNL  Vagina: Moist, pink, no abnormal discharge, well rugated, no lesions  Cervix:, smooth, pink, no visible lesions,Rectal exam: deferred    WET PREP: no trichomonas, monilia, or clue cells   Pap completed.  She is due in a couple months. Also she will be 32 in a couple months so will do 5 year pap with HPV     ASSESSMENT:   Yeast based on symptoms.      PLAN:     Birth control method reviewed.  Abstain from intercourse for duration of treatment.  Return if symptoms do not resolve as anticipated.

## 2017-09-28 NOTE — MR AVS SNAPSHOT
After Visit Summary   2017    Shannon Olivaerz    MRN: [id number]           Patient Information     Date Of Birth          1985        Visit Information        Provider Department      2017 12:45 PM Cely Lopez APRN [facility]        Today's Diagnoses     Dysuria    -  1    Vaginal itching        Screening for malignant neoplasm of cervix        Need for prophylactic vaccination and inoculation against influenza          Care Instructions    Here is a list of suggestions that may help eliminate or prevent vaginal infections or reduce their recurrence.  Many of these suggestions:   1. boosting your immune system  2. changing the vaginal environment to a more acidic state   3. increasing the good healthy bacteria    Read through them and try the ones that seem to fit you and your life style.    Soak in a warm bath tub, no soap, no bubble bath and no oils.  Add one cup vinegar or lemon juice to bath water once in a while,     Keep a water bottle with a squirt top in your bathroom, fill with warm water and use as a spray after wiping, then pat dry.  You may add 1-3 TBS of white vinegar to help maintain an acidic environment.    Wear cotton underwear; loose pants or skirts, avoid pantyhose and thong underwear.    Try to avoid wearing underware to bed so that your vaginal area can breathe and stay drier.    Keep vaginal area as dry as possible so don't sit for long periods of time in workout clothing or wet bathing suits.     Consider using either male or female condoms or asking your partner not to ejaculate in your vagina.    To boost your immune system consider the followin. Sleep:7-8 hours a night  2. Fluids: get a minimum of 8-10 glasses of water a day  3. Foods: try reducing processed foods in your diet and add whole grains, raw vegetables, fruit, and yogurt that contains active culture or kefir  4. Consider taking a vitamin B complex tablet, sometimes  called stress tabs, Vitamin D 1000mg a day, or cranberry tablets.    5.  Consider the stress in your life and try to reduce it through yoga or meditation.    Decrease daily intake of refined sugars, honey, red meat and alcohol    At each meal drink 1tsp apple cider vinegar and 1 tsp honey in   cup warm water    Consider probiotic tablets to restore normal bacteria back into your system    Boric acid capsules, insert 2 capsules  00  daily into vagina every 3 days if you have chronic problems with yeast or bacterial vaginosis.    If your symptoms do not resolve or if you have questions please call the clinic at [phone] for [place] or [phone] for [place] or send a message through My Chart.             Follow-ups after your visit        Your next 10 appointments already scheduled     Oct 26, 2017 12:30 PM CDT   [facility] Naila Kc [facility]-C   [facility]    [address] [place] [id number] [phone]              Who to contact     If you have questions or need follow up information about today's clinic visit or your schedule please contact [facility] directly at [phone].  Normal or non-critical lab and imaging results will be communicated to you by [employer], letter or phone within 4 business days after the clinic has received the results. If you do not hear from us within 7 days, please contact the clinic through [employer] or phone. If you have a critical or abnormal lab result, we will notify you by phone as soon as possible.  Submit refill requests through [employer] or call your pharmacy and they will forward the refill request to us. Please allow 3 business days for your refill to be completed.          Additional Information About Your Visit        [employer] Information     [employer] gives you secure access to your electronic health record. If you see a primary care provider, you can also send  messages to your care team and make appointments. If you have questions, please call your primary care clinic.  If you do not have a primary care provider, please call [phone] and they will assist you.        Care EveryWhere ID     This is your Care EveryWhere ID. This could be used by other organizations to access your [place] medical records  [id number]        Your Vitals Were     Pulse Last Period BMI (Body Mass Index)             84 09/04/2017 (Exact Date) 22.51 kg/m2          Blood Pressure from Last 3 Encounters:   09/28/17 124/82   06/22/17 102/76   03/23/17 114/82    Weight from Last 3 Encounters:   09/28/17 137 lb 4 oz (62.3 kg)   06/22/17 128 lb (58.1 kg)   03/23/17 127 lb 12 oz (57.9 kg)              We Performed the Following     *UA reflex to Microscopic and Culture ([place] and [facility] (except [place] and [place])     FLU VAC, SPLIT VIRUS IM > 3 YO (QUADRIVALENT) [92279]     HPV High Risk Types DNA Cervical     Pap imaged thin layer screen with HPV - recommended age 30 - 65 years (select HPV order below)     Vaccine Administration, Initial [99347]     Wet prep          Today's Medication Changes          These changes are accurate as of: 9/28/17  2:32 PM.  If you have any questions, ask your nurse or doctor.               Start taking these medicines.        Dose/Directions    fluconazole 150 MG tablet   Commonly known as:  DIFLUCAN   Used for:  Vaginal itching   Started by:  Cely Lopez APRN CNM        Dose:  150 mg   Take 1 tablet (150 mg) by mouth every 3 days   Quantity:  3 tablet   Refills:  0            Where to get your medicines      These medications were sent to [facility] [place], [place] - [address]  [address] [facility] [address]     Phone:  [phone]     fluconazole 150 MG tablet                Primary Care Provider Office Phone # Fax #    Naila Kc PA-C [phone]       [address]  [place]  [facility] [address]        Equal Access to Services     [facility]: Hadii aad ku hadstephenhal Zaidali, waaxda luqadaha, qaybta kaalmada gus, herb jose. So [facility] [phone].    ATENCIÓN: Si habla español, tiene a shankar disposición servicios gratuitos de asistencia lingüística. Elvira al [phone].    We comply with applicable federal civil rights laws and [place] laws. We do not discriminate on the basis of race, color, national origin, age, disability sex, sexual orientation or gender identity.            Thank you!     Thank you for choosing [facility] [place] [facility]  for your care. Our goal is always to provide you with excellent care. Hearing back from our patients is one way we can continue to improve our services. Please take a few minutes to complete the written survey that you may receive in the mail after your visit with us. Thank you!             Your Updated Medication List - Protect others around you: Learn how to safely use, store and throw away your medicines at www.disposemymeds.org.          This list is accurate as of: 9/28/17  2:32 PM.  Always use your most recent med list.                   Brand Name Dispense Instructions for use Diagnosis    beclomethasone 80 MCG/ACT Inhaler    QVAR    1 Inhaler    2 puffs into nasal passages once daily with adapter    Chronic maxillary sinusitis       FIBER PO      Take by mouth daily as needed        fluconazole 150 MG tablet    DIFLUCAN    3 tablet    Take 1 tablet (150 mg) by mouth every 3 days    Vaginal itching       ibuprofen 200 MG tablet    ADVIL/MOTRIN     Take 2 tablets by mouth 2 times daily as needed.        montelukast 10 MG tablet    SINGULAIR    90 tablet    Take 1 tablet (10 mg) by mouth At Bedtime    Seasonal allergic rhinitis due to pollen       Multi-vitamin Tabs tablet   Generic drug:  multivitamin, therapeutic with minerals      Take 1 tablet by mouth daily.        NASACORT ALLERGY 24HR 55 MCG/ACT Inhaler    Generic drug:  triamcinolone      Spray 1 spray into both nostrils daily        norgestim-eth estrad triphasic 0.18/0.215/0.25 MG-35 MCG per tablet    ORTHO TRI-CYCLEN (28)    84 tablet    Take 1 tablet by mouth daily    Encounter for surveillance of contraceptive pills       oxybutynin 5 MG 24 hr tablet    DITROPAN-XL    90 tablet    Take 1 tablet (5 mg) by mouth daily    Urinary frequency

## 2017-09-28 NOTE — NURSING NOTE
"Chief Complaint   Patient presents with     Vaginal Problem     check for UTI or vaginal infection       Initial /82 (BP Location: Right arm, Patient Position: Chair, Cuff Size: Adult Regular)  Pulse 84  Wt 137 lb 4 oz (62.3 kg)  LMP 09/04/2017 (Exact Date)  BMI 22.51 kg/m2 Estimated body mass index is 22.51 kg/(m^2) as calculated from the following:    Height as of 1/12/17: 5' 5.47\" (1.663 m).    Weight as of this encounter: 137 lb 4 oz (62.3 kg).  Medication Reconciliation: complete   Lucinda Charles CMA      "

## 2017-09-28 NOTE — PROGRESS NOTES
Injectable Influenza Immunization Documentation    1.  Is the person to be vaccinated sick today?   No    2. Does the person to be vaccinated have an allergy to a component   of the vaccine?   No    3. Has the person to be vaccinated ever had a serious reaction   to influenza vaccine in the past?   No    4. Has the person to be vaccinated ever had Guillain-Barré syndrome?   No    Form completed by     Prior to injection verified patient identity using patient's name and date of birth.

## 2017-10-02 LAB
COPATH REPORT: NORMAL
PAP: NORMAL

## 2017-10-04 LAB
FINAL DIAGNOSIS: NORMAL
HPV HR 12 DNA CVX QL NAA+PROBE: NEGATIVE
HPV16 DNA SPEC QL NAA+PROBE: NEGATIVE
HPV18 DNA SPEC QL NAA+PROBE: NEGATIVE
SPECIMEN DESCRIPTION: NORMAL

## 2017-10-25 ENCOUNTER — MYC MEDICAL ADVICE (OUTPATIENT)
Dept: FAMILY MEDICINE | Facility: OTHER | Age: 32
End: 2017-10-25

## 2017-11-30 NOTE — PROGRESS NOTES
SUBJECTIVE:   CC: Shannon Olivarez is an 32 year old woman who presents for preventive health visit.     Physical   Annual:     Getting at least 3 servings of Calcium per day::  NO    Bi-annual eye exam::  Yes    Dental care twice a year::  Yes    Sleep apnea or symptoms of sleep apnea::  None    Diet::  Regular (no restrictions)    Frequency of exercise::  1 day/week    Duration of exercise::  Other    Taking medications regularly::  Yes    Medication side effects::  Other    Additional concerns today::  YES        Medication Followup of all medications     Taking Medication as prescribed: yes    Side Effects:  None    Medication Helping Symptoms:  Yes    - Birth control     Regular, lasts 4-5 days, headache the whole time has periods, at least 6 months      Otherwise going well       Never been on anything else     - Qvar inhaler - uses seasonally     Singular- uses daily     Oxybutynin - going well, no side effects     Still some vaginitis from yeast   - Was treated in 9/28/17 with Diflucan  - Little bit of discharge, not itching but more burning   - Tried OTC topical which helped a little   - Happens when doesn't make it to the bathroom most of the time  - Maybe 1 per week   - No new products                Today's PHQ-2 Score: PHQ-2 ( 1999 Pfizer) 10/23/2017   Q1: Little interest or pleasure in doing things 0   Q2: Feeling down, depressed or hopeless 0   PHQ-2 Score 0   Q1: Little interest or pleasure in doing things Not at all   Q2: Feeling down, depressed or hopeless Not at all   PHQ-2 Score 0       Abuse: Current or Past(Physical, Sexual or Emotional)- No  Do you feel safe in your environment - Yes    Social History   Substance Use Topics     Smoking status: Never Smoker     Smokeless tobacco: Never Used     Alcohol use Yes     The patient does not drink >3 drinks per day nor >7 drinks per week.    Reviewed orders with patient.  Reviewed health maintenance and updated orders accordingly - Yes  Labs  reviewed in EPIC  BP Readings from Last 3 Encounters:   09/28/17 124/82   06/22/17 102/76   03/23/17 114/82    Wt Readings from Last 3 Encounters:   09/28/17 137 lb 4 oz (62.3 kg)   06/22/17 128 lb (58.1 kg)   03/23/17 127 lb 12 oz (57.9 kg)                  Patient Active Problem List   Diagnosis     GERD (gastroesophageal reflux disease)     Acne     Spina bifida of sacral region without hydrocephalus (H)     Motion sickness     OAB (overactive bladder)     Encounter for surveillance of contraceptives     Chronic maxillary sinusitis     Past Surgical History:   Procedure Laterality Date     NO HISTORY OF SURGERY         Social History   Substance Use Topics     Smoking status: Never Smoker     Smokeless tobacco: Never Used     Alcohol use Yes     Family History   Problem Relation Age of Onset     Allergies Mother      Asthma Mother      Allergies Father      Prostate Cancer Father      CANCER Maternal Grandfather      skin     HEART DISEASE Maternal Grandfather      Cardiovascular Maternal Grandfather      Lipids Maternal Grandfather      Other Cancer Maternal Grandfather      Lymphoma     Unknown/Adopted Paternal Grandmother      Unknown/Adopted Paternal Grandfather      Asthma Brother      Allergies Brother      Hypertension Maternal Grandmother      CEREBROVASCULAR DISEASE Maternal Grandmother      Breast Cancer Other      Paternal Aunt     Breast Cancer Other      Paternal Aunt     MENTAL ILLNESS Other      Maternal Uncle - Schizophrenia     Asthma Brother                Mammogram not appropriate for this patient based on age.    Pertinent mammograms are reviewed under the imaging tab.  History of abnormal Pap smear: NO - age 30- 65 PAP every 3 years recommended    Reviewed and updated as needed this visit by clinical staff  Allergies  Meds  Problems         Reviewed and updated as needed this visit by Provider  Allergies  Meds  Problems        Review of Systems   Constitutional: Negative for chills and  "fever.   HENT: Positive for congestion. Negative for ear pain, hearing loss and sore throat.    Eyes: Negative for pain and visual disturbance.   Respiratory: Negative for cough and shortness of breath.    Cardiovascular: Negative for chest pain, palpitations and peripheral edema.   Gastrointestinal: Negative for abdominal pain, constipation, diarrhea, heartburn, hematochezia and nausea.   Genitourinary: Positive for vaginal discharge. Negative for dysuria, frequency, genital sores, hematuria, pelvic pain, urgency and vaginal bleeding.   Musculoskeletal: Negative for arthralgias, joint swelling and myalgias.   Skin: Negative for rash.   Neurological: Positive for headaches. Negative for dizziness, weakness and paresthesias.   Psychiatric/Behavioral: Negative for mood changes. The patient is not nervous/anxious.        OBJECTIVE:   /80 (BP Location: Left arm, Patient Position: Chair, Cuff Size: Adult Regular)  Pulse 88  Temp 98  F (36.7  C) (Oral)  Resp 16  Ht 5' 4.8\" (1.646 m)  Wt 139 lb (63 kg)  SpO2 98%  BMI 23.27 kg/m2  Physical Exam   Constitutional: She is oriented to person, place, and time. She appears well-developed and well-nourished. No distress.   HENT:   Right Ear: External ear normal.   Left Ear: External ear normal.   Nose: Nose normal.   Mouth/Throat: Oropharynx is clear and moist. No oropharyngeal exudate.   Eyes: Conjunctivae are normal. Pupils are equal, round, and reactive to light. Right eye exhibits no discharge. Left eye exhibits no discharge.   Neck: Neck supple. No tracheal deviation present. No thyromegaly present.   Cardiovascular: Normal rate, regular rhythm, S1 normal, S2 normal, normal heart sounds and normal pulses.  Exam reveals no S3, no S4 and no friction rub.    No murmur heard.  Pulmonary/Chest: Effort normal and breath sounds normal. No respiratory distress. She has no wheezes. She has no rales.   Abdominal: Soft. Bowel sounds are normal. She exhibits no mass. There " is no hepatosplenomegaly. There is no tenderness.   Genitourinary: No breast swelling, tenderness or discharge.   Musculoskeletal: Normal range of motion. She exhibits no edema.   Lymphadenopathy:     She has no cervical adenopathy.   Neurological: She is alert and oriented to person, place, and time. She has normal strength and normal reflexes. She exhibits normal muscle tone.   Skin: Skin is warm and dry. No rash noted.   Psychiatric: She has a normal mood and affect. Judgment and thought content normal. Cognition and memory are normal.         ASSESSMENT/PLAN:       ICD-10-CM    1. Encounter for routine adult health examination without abnormal findings Z00.00    2. Urinary frequency R35.0 oxybutynin (DITROPAN-XL) 5 MG 24 hr tablet   3. Chronic seasonal allergic rhinitis due to pollen J30.1 montelukast (SINGULAIR) 10 MG tablet   4. Encounter for surveillance of contraceptive pills Z30.41 norgestim-eth estrad triphasic (ORTHO TRI-CYCLEN, 28,) 0.18/0.215/0.25 MG-35 MCG per tablet     norgestim-eth estrad triphasic (ORTHO TRI-CYCLEN LO) 0.18/0.215/0.25 MG-25 MCG per tablet   5. Chronic maxillary sinusitis J32.0 beclomethasone (QVAR) 80 MCG/ACT Inhaler   6. Vaginal itching L29.8 fluconazole (DIFLUCAN) 150 MG tablet     - Refilled medications as needed, discussed use and side effects     - OCP      Having headaches week of period       Recommend we try decreasing the estrogen amount, move to Ortho-tri cyclen low, discussed use and side effects, patient has been off medication for 1 week so ok for immediate start or Sunday start plan, use additional barrier contraception until after 1st pack completed      Monitor headaches as well as additional side effects that were discussed      Try for 2-3 months, if symptoms continue, return to clinic and will consider mono-cyclic and further evaluation of headaches     - Vaginitis      Chronic issue for patient, states diflucan for 3 doses did help but came right back      Exam  "declined by patient since just had PAP and wet prep for this      No concerns for STDS      Discussed infectious (yeast vs bacteria) vs. Irritative vs atrophic causes      Consider atrophic due to concurrent oxybutynin use but can't rule out others      Recommend following plan             1. Diflucan - yeast coverage, one tablet every 3 days for 5 doses              2. Vaginal lubricant - apply at night once nightly for 2 weeks to see if helps with possible atrophy              3. Patient does state has one red spot in inner labia, will treat for potential irritative vaginitis, light later of OTC hydrocortisone once daily, not more than two weeks            If symptoms persist 2-3 weeks, return to clinic         COUNSELING:  Reviewed preventive health counseling, as reflected in patient instructions  Special attention given to:        Regular exercise       Healthy diet/nutrition       Contraception         reports that she has never smoked. She has never used smokeless tobacco.    Estimated body mass index is 22.51 kg/(m^2) as calculated from the following:    Height as of 1/12/17: 5' 5.47\" (1.663 m).    Weight as of 9/28/17: 137 lb 4 oz (62.3 kg).         Counseling Resources:  ATP IV Guidelines  Pooled Cohorts Equation Calculator  Breast Cancer Risk Calculator  FRAX Risk Assessment  ICSI Preventive Guidelines  Dietary Guidelines for Americans, 2010  USDA's MyPlate  ASA Prophylaxis  Lung CA Screening    Naila Kc PA-C  [facility]  "

## 2017-11-30 NOTE — PATIENT INSTRUCTIONS
- Trial of Ortho Tri-cyclin Lo      Give 2-3 months      If headaches persist, return to clinic        - Diflucan -1 tablet every 3 days for 5 doses   - OTC Replens or Pre-Seed, 1 application at night for 1-2 weeks   - On sensitive spots, OTC hydrocortisone once a day at night for no more than two weeks     - If no change in 2-3 weeks, return to clinic          Preventive Health Recommendations  Female Ages 26 - 39  Yearly exam:   See your health care provider every year in order to    Review health changes.     Discuss preventive care.      Review your medicines if you your doctor has prescribed any.    Until age 30: Get a Pap test every three years (more often if you have had an abnormal result).    After age 30: Talk to your doctor about whether you should have a Pap test every 3 years or have a Pap test with HPV screening every 5 years.   You do not need a Pap test if your uterus was removed (hysterectomy) and you have not had cancer.  You should be tested each year for STDs (sexually transmitted diseases), if you're at risk.   Talk to your provider about how often to have your cholesterol checked.  If you are at risk for diabetes, you should have a diabetes test (fasting glucose).  Shots: Get a flu shot each year. Get a tetanus shot every 10 years.   Nutrition:     Eat at least 5 servings of fruits and vegetables each day.    Eat whole-grain bread, whole-wheat pasta and brown rice instead of white grains and rice.    Talk to your provider about Calcium and Vitamin D.     Lifestyle    Exercise at least 150 minutes a week (30 minutes a day, 5 days of the week). This will help you control your weight and prevent disease.    Limit alcohol to one drink per day.    No smoking.     Wear sunscreen to prevent skin cancer.    See your dentist every six months for an exam and cleaning.

## 2017-12-07 ENCOUNTER — OFFICE VISIT (OUTPATIENT)
Dept: FAMILY MEDICINE | Facility: OTHER | Age: 32
End: 2017-12-07
Payer: COMMERCIAL

## 2017-12-07 VITALS
DIASTOLIC BLOOD PRESSURE: 80 MMHG | OXYGEN SATURATION: 98 % | HEART RATE: 88 BPM | RESPIRATION RATE: 16 BRPM | WEIGHT: 139 LBS | HEIGHT: 65 IN | SYSTOLIC BLOOD PRESSURE: 102 MMHG | TEMPERATURE: 98 F | BODY MASS INDEX: 23.16 KG/M2

## 2017-12-07 DIAGNOSIS — R35.0 URINARY FREQUENCY: ICD-10-CM

## 2017-12-07 DIAGNOSIS — Z30.41 ENCOUNTER FOR SURVEILLANCE OF CONTRACEPTIVE PILLS: ICD-10-CM

## 2017-12-07 DIAGNOSIS — N89.8 VAGINAL ITCHING: ICD-10-CM

## 2017-12-07 DIAGNOSIS — J30.1 CHRONIC SEASONAL ALLERGIC RHINITIS DUE TO POLLEN: ICD-10-CM

## 2017-12-07 DIAGNOSIS — J32.0 CHRONIC MAXILLARY SINUSITIS: ICD-10-CM

## 2017-12-07 DIAGNOSIS — Z00.00 ENCOUNTER FOR ROUTINE ADULT HEALTH EXAMINATION WITHOUT ABNORMAL FINDINGS: Primary | ICD-10-CM

## 2017-12-07 PROCEDURE — 99395 PREV VISIT EST AGE 18-39: CPT | Performed by: PHYSICIAN ASSISTANT

## 2017-12-07 RX ORDER — MONTELUKAST SODIUM 10 MG/1
10 TABLET ORAL AT BEDTIME
Qty: 90 TABLET | Refills: 3 | Status: SHIPPED | OUTPATIENT
Start: 2017-12-07 | End: 2019-01-03

## 2017-12-07 RX ORDER — OXYBUTYNIN CHLORIDE 5 MG/1
5 TABLET, EXTENDED RELEASE ORAL DAILY
Qty: 90 TABLET | Refills: 3 | Status: SHIPPED | OUTPATIENT
Start: 2017-12-07 | End: 2018-12-20

## 2017-12-07 RX ORDER — NORGESTIMATE AND ETHINYL ESTRADIOL 7DAYSX3 LO
1 KIT ORAL DAILY
Qty: 84 TABLET | Refills: 3 | Status: SHIPPED | OUTPATIENT
Start: 2017-12-07 | End: 2018-10-29

## 2017-12-07 RX ORDER — NORGESTIMATE AND ETHINYL ESTRADIOL 7DAYSX3 28
1 KIT ORAL DAILY
Qty: 84 TABLET | Refills: 3 | Status: SHIPPED | OUTPATIENT
Start: 2017-12-07 | End: 2018-12-20

## 2017-12-07 RX ORDER — FLUCONAZOLE 150 MG/1
150 TABLET ORAL
Qty: 5 TABLET | Refills: 0 | Status: SHIPPED | OUTPATIENT
Start: 2017-12-07 | End: 2018-12-20

## 2017-12-07 ASSESSMENT — ENCOUNTER SYMPTOMS
CHILLS: 0
HEARTBURN: 0
MYALGIAS: 0
CONSTIPATION: 0
WEAKNESS: 0
HEMATURIA: 0
FREQUENCY: 0
FEVER: 0
DIARRHEA: 0
HEADACHES: 1
PALPITATIONS: 0
SORE THROAT: 0
EYE PAIN: 0
DIZZINESS: 0
SHORTNESS OF BREATH: 0
HEMATOCHEZIA: 0
JOINT SWELLING: 0
NERVOUS/ANXIOUS: 0
PARESTHESIAS: 0
ARTHRALGIAS: 0
DYSURIA: 0
COUGH: 0
ABDOMINAL PAIN: 0
NAUSEA: 0

## 2017-12-07 NOTE — MR AVS SNAPSHOT
After Visit Summary   12/7/2017    Shannon Olivarez    MRN: [id number]           Patient Information     Date Of Birth          1985        Visit Information        Provider Department      12/7/2017 12:30 PM Naila Kc PA-C [facility]        Today's Diagnoses     Encounter for routine adult health examination without abnormal findings    -  1    Urinary frequency        Chronic seasonal allergic rhinitis due to pollen        Encounter for surveillance of contraceptive pills        Chronic maxillary sinusitis        Vaginal itching          Care Instructions      - Trial of Ortho Tri-cyclin Lo      Give 2-3 months      If headaches persist, return to clinic        - Diflucan -1 tablet every 3 days for 5 doses   - OTC Replens or Pre-Seed, 1 application at night for 1-2 weeks   - On sensitive spots, OTC hydrocortisone once a day at night for no more than two weeks     - If no change in 2-3 weeks, return to clinic          Preventive Health Recommendations  Female Ages 26 - 39  Yearly exam:   See your health care provider every year in order to    Review health changes.     Discuss preventive care.      Review your medicines if you your doctor has prescribed any.    Until age 30: Get a Pap test every three years (more often if you have had an abnormal result).    After age 30: Talk to your doctor about whether you should have a Pap test every 3 years or have a Pap test with HPV screening every 5 years.   You do not need a Pap test if your uterus was removed (hysterectomy) and you have not had cancer.  You should be tested each year for STDs (sexually transmitted diseases), if you're at risk.   Talk to your provider about how often to have your cholesterol checked.  If you are at risk for diabetes, you should have a diabetes test (fasting glucose).  Shots: Get a flu shot each year. Get a tetanus shot every 10 years.   Nutrition:     Eat at least 5 servings of  fruits and vegetables each day.    Eat whole-grain bread, whole-wheat pasta and brown rice instead of white grains and rice.    Talk to your provider about Calcium and Vitamin D.     Lifestyle    Exercise at least 150 minutes a week (30 minutes a day, 5 days of the week). This will help you control your weight and prevent disease.    Limit alcohol to one drink per day.    No smoking.     Wear sunscreen to prevent skin cancer.    See your dentist every six months for an exam and cleaning.            Follow-ups after your visit        Who to contact     If you have questions or need follow up information about today's clinic visit or your schedule please contact [facility] directly at [phone].  Normal or non-critical lab and imaging results will be communicated to you by [employer], letter or phone within 4 business days after the clinic has received the results. If you do not hear from us within 7 days, please contact the clinic through [employer] or phone. If you have a critical or abnormal lab result, we will notify you by phone as soon as possible.  Submit refill requests through [employer] or call your pharmacy and they will forward the refill request to us. Please allow 3 business days for your refill to be completed.          Additional Information About Your Visit        [employer] Information     [employer] gives you secure access to your electronic health record. If you see a primary care provider, you can also send messages to your care team and make appointments. If you have questions, please call your primary care clinic.  If you do not have a primary care provider, please call [phone] and they will assist you.        Care EveryWhere ID     This is your Care EveryWhere ID. This could be used by other organizations to access your [place] medical records  [id number]        Your Vitals Were     Pulse Temperature Respirations Height Pulse Oximetry BMI (Body Mass Index)    88 98  F (36.7  C)  "(Oral) 16 5' 4.8\" (1.646 m) 98% 23.27 kg/m2       Blood Pressure from Last 3 Encounters:   12/07/17 102/80   09/28/17 124/82   06/22/17 102/76    Weight from Last 3 Encounters:   12/07/17 139 lb (63 kg)   09/28/17 137 lb 4 oz (62.3 kg)   06/22/17 128 lb (58.1 kg)              Today, you had the following     No orders found for display         Today's Medication Changes          These changes are accurate as of: 12/7/17  1:19 PM.  If you have any questions, ask your nurse or doctor.               These medicines have changed or have updated prescriptions.        Dose/Directions    * norgestim-eth estrad triphasic 0.18/0.215/0.25 MG-35 MCG per tablet   Commonly known as:  ORTHO TRI-CYCLEN (28)   This may have changed:  Another medication with the same name was added. Make sure you understand how and when to take each.   Used for:  Encounter for surveillance of contraceptive pills   Changed by:  Naila Kc PA-C        Dose:  1 tablet   Take 1 tablet by mouth daily   Quantity:  84 tablet   Refills:  3       * norgestim-eth estrad triphasic 0.18/0.215/0.25 MG-25 MCG per tablet   Commonly known as:  ORTHO TRI-CYCLEN LO   This may have changed:  You were already taking a medication with the same name, and this prescription was added. Make sure you understand how and when to take each.   Used for:  Encounter for surveillance of contraceptive pills   Changed by:  Naila Kc PA-C        Dose:  1 tablet   Take 1 tablet by mouth daily   Quantity:  84 tablet   Refills:  3       * Notice:  This list has 2 medication(s) that are the same as other medications prescribed for you. Read the directions carefully, and ask your doctor or other care provider to review them with you.         Where to get your medicines      These medications were sent to [facility] [place], [place] - [address]  [address], [facility] [address]     Phone:  [phone]     beclomethasone 80 " MCG/ACT Inhaler    fluconazole 150 MG tablet    montelukast 10 MG tablet    norgestim-eth estrad triphasic 0.18/0.215/0.25 MG-25 MCG per tablet    norgestim-eth estrad triphasic 0.18/0.215/0.25 MG-35 MCG per tablet    oxybutynin 5 MG 24 hr tablet                Primary Care Provider Office Phone # Fax #    Naila MACHADO MARY Kc [phone]       [address] [place] [address] [address]  [facility] [id number]        Equal Access to Services     [facility]: Hadii aad ku hadasho Soomaali, waaxda luqadaha, qaybta kaalmada adeegyajaison, herb garibay . So [facility] [phone].    ATENCIÓN: Si habla español, tiene a shankar disposición servicios gratuitos de asistencia lingüística. [facility] [phone].    We comply with applicable federal civil rights laws and [place] laws. We do not discriminate on the basis of race, color, national origin, age, disability, sex, sexual orientation, or gender identity.            Thank you!     Thank you for choosing [facility]  for your care. Our goal is always to provide you with excellent care. Hearing back from our patients is one way we can continue to improve our services. Please take a few minutes to complete the written survey that you may receive in the mail after your visit with us. Thank you!             Your Updated Medication List - Protect others around you: Learn how to safely use, store and throw away your medicines at www.disposemymeds.org.          This list is accurate as of: 12/7/17  1:19 PM.  Always use your most recent med list.                   Brand Name Dispense Instructions for use Diagnosis    beclomethasone 80 MCG/ACT Inhaler    QVAR    1 Inhaler    2 puffs into nasal passages once daily with adapter    Chronic maxillary sinusitis       FIBER PO      Take by mouth daily as needed        fluconazole 150 MG tablet    DIFLUCAN    5 tablet    Take 1 tablet (150 mg) by mouth every 3 days    Vaginal itching        ibuprofen 200 MG tablet    ADVIL/MOTRIN     Take 2 tablets by mouth 2 times daily as needed.        montelukast 10 MG tablet    SINGULAIR    90 tablet    Take 1 tablet (10 mg) by mouth At Bedtime    Chronic seasonal allergic rhinitis due to pollen       Multi-vitamin Tabs tablet   Generic drug:  multivitamin, therapeutic with minerals      Take 1 tablet by mouth daily.        NASACORT ALLERGY 24HR 55 MCG/ACT Inhaler   Generic drug:  triamcinolone      Spray 1 spray into both nostrils daily        * norgestim-eth estrad triphasic 0.18/0.215/0.25 MG-35 MCG per tablet    ORTHO TRI-CYCLEN (28)    84 tablet    Take 1 tablet by mouth daily    Encounter for surveillance of contraceptive pills       * norgestim-eth estrad triphasic 0.18/0.215/0.25 MG-25 MCG per tablet    ORTHO TRI-CYCLEN LO    84 tablet    Take 1 tablet by mouth daily    Encounter for surveillance of contraceptive pills       oxybutynin 5 MG 24 hr tablet    DITROPAN-XL    90 tablet    Take 1 tablet (5 mg) by mouth daily    Urinary frequency       * Notice:  This list has 2 medication(s) that are the same as other medications prescribed for you. Read the directions carefully, and ask your doctor or other care provider to review them with you.

## 2018-03-12 ENCOUNTER — OFFICE VISIT (OUTPATIENT)
Dept: URGENT CARE | Facility: URGENT CARE | Age: 33
End: 2018-03-12
Payer: OTHER MISCELLANEOUS

## 2018-03-12 VITALS
SYSTOLIC BLOOD PRESSURE: 149 MMHG | BODY MASS INDEX: 23.61 KG/M2 | TEMPERATURE: 97.2 F | HEART RATE: 92 BPM | WEIGHT: 141 LBS | DIASTOLIC BLOOD PRESSURE: 92 MMHG | OXYGEN SATURATION: 98 %

## 2018-03-12 DIAGNOSIS — S01.01XA LACERATION OF SCALP WITHOUT FOREIGN BODY, INITIAL ENCOUNTER: Primary | ICD-10-CM

## 2018-03-12 PROCEDURE — 99213 OFFICE O/P EST LOW 20 MIN: CPT | Performed by: HOSPITALIST

## 2018-03-12 NOTE — MR AVS SNAPSHOT
After Visit Summary   3/12/2018    Shannon Olivarez    MRN: [id number]           Patient Information     Date Of Birth          1985        Visit Information        Provider Department      3/12/2018 6:10 PM Jose Bedolla MD [facility]        Today's Diagnoses     Laceration of scalp without foreign body, initial encounter    -  1       Follow-ups after your visit        Who to contact     If you have questions or need follow up information about today's clinic visit or your schedule please contact [facility] directly at [phone].  Normal or non-critical lab and imaging results will be communicated to you by Legendary Pictureshart, letter or phone within 4 business days after the clinic has received the results. If you do not hear from us within 7 days, please contact the clinic through Code Bluet or phone. If you have a critical or abnormal lab result, we will notify you by phone as soon as possible.  Submit refill requests through BRIKA or call your pharmacy and they will forward the refill request to us. Please allow 3 business days for your refill to be completed.          Additional Information About Your Visit        MyChart Information     BRIKA gives you secure access to your electronic health record. If you see a primary care provider, you can also send messages to your care team and make appointments. If you have questions, please call your primary care clinic.  If you do not have a primary care provider, please call [phone] and they will assist you.        Care EveryWhere ID     This is your Care EveryWhere ID. This could be used by other organizations to access your [place] medical records  [id number]        Your Vitals Were     Pulse Temperature Pulse Oximetry BMI (Body Mass Index)          92 97.2  F (36.2  C) (Tympanic) 98% 23.61 kg/m2         Blood Pressure from Last 3 Encounters:   03/12/18 (!) 149/92   12/07/17 102/80   09/28/17 124/82    Weight  from Last 3 Encounters:   03/12/18 141 lb (64 kg)   12/07/17 139 lb (63 kg)   09/28/17 137 lb 4 oz (62.3 kg)              Today, you had the following     No orders found for display       Primary Care Provider Office Phone # Fax #    Naila MACHADO MARY K[facility] [phone]       [address]  [facility] [address]        Equal Access to Services     LILI PATEL : Hadii aad ku hadasho Soomaali, waaxda luqadaha, qaybta kaalmada adeegyada, waxay idiin hayaan adeeg romeoaradagmar laanthony . So [facility] [phone].    ATENCIÓN: Si gasperla español, tiene a shankar disposición servicios gratuitos de asistencia lingüística. [facility] [phone].    We comply with applicable federal civil rights laws and [place] laws. We do not discriminate on the basis of race, color, national origin, age, disability, sex, sexual orientation, or gender identity.            Thank you!     [facility] you [facility]  [facility]. Our goal is always to provide you with excellent care. Hearing back from our patients is one way we can continue to improve our services. Please take a few minutes to complete the written survey that you may receive in the mail after your visit with us. Thank you!             Your Updated Medication List - Protect others around you: Learn how to safely use, store and throw away your medicines at www.disposemymeds.org.          This list is accurate as of 3/12/18  7:01 PM.  Always use your most recent med list.                   Brand Name Dispense Instructions for use Diagnosis    beclomethasone 80 MCG/ACT Inhaler    QVAR    1 Inhaler    2 puffs into nasal passages once daily with adapter    Chronic maxillary sinusitis       FIBER PO      Take by mouth daily as needed        fluconazole 150 MG tablet    DIFLUCAN    5 tablet    Take 1 tablet (150 mg) by mouth every 3 days    Vaginal itching       ibuprofen 200 MG tablet    ADVIL/MOTRIN     Take 2 tablets by mouth 2 times daily  as needed.        montelukast 10 MG tablet    SINGULAIR    90 tablet    Take 1 tablet (10 mg) by mouth At Bedtime    Chronic seasonal allergic rhinitis due to pollen       Multi-vitamin Tabs tablet   Generic drug:  multivitamin, therapeutic with minerals      Take 1 tablet by mouth daily.        NASACORT ALLERGY 24HR 55 MCG/ACT Inhaler   Generic drug:  triamcinolone      Spray 1 spray into both nostrils daily        * norgestim-eth estrad triphasic 0.18/0.215/0.25 MG-35 MCG per tablet    ORTHO TRI-CYCLEN (28)    84 tablet    Take 1 tablet by mouth daily    Encounter for surveillance of contraceptive pills       * norgestim-eth estrad triphasic 0.18/0.215/0.25 MG-25 MCG per tablet    ORTHO TRI-CYCLEN LO    84 tablet    Take 1 tablet by mouth daily    Encounter for surveillance of contraceptive pills       oxybutynin 5 MG 24 hr tablet    DITROPAN-XL    90 tablet    Take 1 tablet (5 mg) by mouth daily    Urinary frequency       * Notice:  This list has 2 medication(s) that are the same as other medications prescribed for you. Read the directions carefully, and ask your doctor or other care provider to review them with you.

## 2018-03-12 NOTE — PROGRESS NOTES
SUBJECTIVE:                                                    Shannon Olivarez is a 32 year old female who presents to clinic today for the following health issues:    WORK COMP:  *Patient hit the top of her head on a dog kennel door today at work. Small laceration noted on the scalp area. About 5 mm in length. Was bleeding but now stop     Allergies   Allergen Reactions     Ceclor [Cefaclor] Swelling     throat     Erythromycin      Can't remember the reaction      Amoxicillin Rash     Suprax [Cefixime] Rash       Past Medical History:   Diagnosis Date     Acne clear    sees derm Dr. Raygoza     Allergic rhinitis, cause unspecified     dr. maldonado     AR (allergic rhinitis)      High cholesterol      Hypertension 10/9/14    High blood pressure again noted at Urgent Care 12/13/14     Melanoma of scalp or neck (H)      Spina bifida (H)     seen on xray when in college         Current Outpatient Prescriptions on File Prior to Visit:  oxybutynin (DITROPAN-XL) 5 MG 24 hr tablet Take 1 tablet (5 mg) by mouth daily   montelukast (SINGULAIR) 10 MG tablet Take 1 tablet (10 mg) by mouth At Bedtime   norgestim-eth estrad triphasic (ORTHO TRI-CYCLEN, 28,) 0.18/0.215/0.25 MG-35 MCG per tablet Take 1 tablet by mouth daily   beclomethasone (QVAR) 80 MCG/ACT Inhaler 2 puffs into nasal passages once daily with adapter   norgestim-eth estrad triphasic (ORTHO TRI-CYCLEN LO) 0.18/0.215/0.25 MG-25 MCG per tablet Take 1 tablet by mouth daily   fluconazole (DIFLUCAN) 150 MG tablet Take 1 tablet (150 mg) by mouth every 3 days   triamcinolone (NASACORT ALLERGY 24HR) 55 MCG/ACT nasal inhaler Spray 1 spray into both nostrils daily   FIBER PO Take by mouth daily as needed   Multiple Vitamin (MULTI-VITAMIN) per tablet Take 1 tablet by mouth daily.   ibuprofen (ADVIL,MOTRIN) 200 MG tablet Take 2 tablets by mouth 2 times daily as needed.   [DISCONTINUED] oxybutynin (DITROPAN-XL) 5 MG 24 hr tablet Take 1 tablet by mouth daily.     No current  facility-administered medications on file prior to visit.     Social History   Substance Use Topics     Smoking status: Never Smoker     Smokeless tobacco: Never Used     Alcohol use Yes       ROS:  Consitutional: As above  ENT: As above  Respiratory: As above    OBJECTIVE:  BP (!) 149/92  Pulse 92  Temp 97.2  F (36.2  C) (Tympanic)  Wt 141 lb (64 kg)  SpO2 98%  BMI 23.61 kg/m2  GENERAL APPEARANCE: healthy, alert and minimal distress  On the scalp I noted small wound about 5-7 mm in length. No tenderness. Blood clot noted  No results found for this or any previous visit (from the past 168 hour(s)).     ASSESSMENT:     ICD-10-CM    1. Laceration of scalp without foreign body, initial encounter S01.01XA          PLAN:    Advice to keep wound dry. Will not put stiches or glue due to location. Advice to keep it dry for the next 24 hours. No sign of head injury. neurologically seem to be intact. May start shampooing in the next 3-4 days.   Tetanus shot is up to date  Follow up if there is sign of infection.     Jose Bedolla MD

## 2018-07-16 NOTE — NURSING NOTE
"Chief Complaint   Patient presents with     Physical     Panel Management     height, gilmer        Initial /80 (BP Location: Left arm, Patient Position: Chair, Cuff Size: Adult Regular)  Pulse 88  Temp 98  F (36.7  C) (Oral)  Resp 16  Ht 5' 4.8\" (1.646 m)  Wt 139 lb (63 kg)  SpO2 98%  BMI 23.27 kg/m2 Estimated body mass index is 23.27 kg/(m^2) as calculated from the following:    Height as of this encounter: 5' 4.8\" (1.646 m).    Weight as of this encounter: 139 lb (63 kg).  Medication Reconciliation: complete  " negative...

## 2018-10-03 ENCOUNTER — TRANSFERRED RECORDS (OUTPATIENT)
Dept: HEALTH INFORMATION MANAGEMENT | Facility: CLINIC | Age: 33
End: 2018-10-03

## 2018-10-29 DIAGNOSIS — Z30.41 ENCOUNTER FOR SURVEILLANCE OF CONTRACEPTIVE PILLS: ICD-10-CM

## 2018-10-30 RX ORDER — NORGESTIMATE AND ETHINYL ESTRADIOL 7DAYSX3 LO
1 KIT ORAL DAILY
Qty: 84 TABLET | Refills: 0 | Status: SHIPPED | OUTPATIENT
Start: 2018-10-30 | End: 2018-12-20

## 2018-10-30 NOTE — TELEPHONE ENCOUNTER
BCP:  Prescription approved per [facility] Refill Protocol.    Charu Pitt, RN, BSN

## 2018-12-20 ENCOUNTER — OFFICE VISIT (OUTPATIENT)
Dept: FAMILY MEDICINE | Facility: OTHER | Age: 33
End: 2018-12-20
Payer: COMMERCIAL

## 2018-12-20 VITALS
WEIGHT: 151 LBS | SYSTOLIC BLOOD PRESSURE: 118 MMHG | DIASTOLIC BLOOD PRESSURE: 72 MMHG | BODY MASS INDEX: 25.16 KG/M2 | HEART RATE: 91 BPM | RESPIRATION RATE: 16 BRPM | TEMPERATURE: 97.6 F | OXYGEN SATURATION: 98 % | HEIGHT: 65 IN

## 2018-12-20 DIAGNOSIS — R51.9 NONINTRACTABLE EPISODIC HEADACHE, UNSPECIFIED HEADACHE TYPE: ICD-10-CM

## 2018-12-20 DIAGNOSIS — Z30.41 ENCOUNTER FOR SURVEILLANCE OF CONTRACEPTIVE PILLS: ICD-10-CM

## 2018-12-20 DIAGNOSIS — R35.0 URINARY FREQUENCY: ICD-10-CM

## 2018-12-20 DIAGNOSIS — Z13.1 SCREENING FOR DIABETES MELLITUS: ICD-10-CM

## 2018-12-20 DIAGNOSIS — Z13.220 SCREENING FOR LIPOID DISORDERS: Primary | ICD-10-CM

## 2018-12-20 DIAGNOSIS — N31.9 NEUROGENIC BLADDER: ICD-10-CM

## 2018-12-20 DIAGNOSIS — Z23 NEED FOR PROPHYLACTIC VACCINATION AND INOCULATION AGAINST INFLUENZA: ICD-10-CM

## 2018-12-20 DIAGNOSIS — Z00.01 ENCOUNTER FOR ROUTINE ADULT MEDICAL EXAM WITH ABNORMAL FINDINGS: ICD-10-CM

## 2018-12-20 DIAGNOSIS — Q05.8 SPINA BIFIDA OF SACRAL REGION WITHOUT HYDROCEPHALUS (H): ICD-10-CM

## 2018-12-20 DIAGNOSIS — Q65.89 CONGENITAL DYSPLASIA OF BOTH HIPS: ICD-10-CM

## 2018-12-20 DIAGNOSIS — N32.81 OAB (OVERACTIVE BLADDER): ICD-10-CM

## 2018-12-20 PROCEDURE — 90686 IIV4 VACC NO PRSV 0.5 ML IM: CPT | Performed by: FAMILY MEDICINE

## 2018-12-20 PROCEDURE — 90471 IMMUNIZATION ADMIN: CPT | Performed by: FAMILY MEDICINE

## 2018-12-20 PROCEDURE — 99395 PREV VISIT EST AGE 18-39: CPT | Mod: 25 | Performed by: FAMILY MEDICINE

## 2018-12-20 RX ORDER — OXYBUTYNIN CHLORIDE 5 MG/1
5 TABLET, EXTENDED RELEASE ORAL DAILY
Qty: 90 TABLET | Refills: 3 | Status: SHIPPED | OUTPATIENT
Start: 2018-12-20 | End: 2019-09-10

## 2018-12-20 RX ORDER — SUMATRIPTAN 50 MG/1
50 TABLET, FILM COATED ORAL
Qty: 18 TABLET | Refills: 3 | Status: SHIPPED | OUTPATIENT
Start: 2018-12-20 | End: 2019-09-10

## 2018-12-20 RX ORDER — NORGESTIMATE AND ETHINYL ESTRADIOL 7DAYSX3 LO
1 KIT ORAL DAILY
Qty: 84 TABLET | Refills: 3 | Status: SHIPPED | OUTPATIENT
Start: 2018-12-20 | End: 2019-12-04

## 2018-12-20 ASSESSMENT — ENCOUNTER SYMPTOMS
NERVOUS/ANXIOUS: 0
HEADACHES: 1
ALLERGIC/IMMUNOLOGIC NEGATIVE: 1
MYALGIAS: 0
HEMATOLOGIC/LYMPHATIC NEGATIVE: 1
HEMATOCHEZIA: 0
CARDIOVASCULAR NEGATIVE: 1
CONSTIPATION: 0
EYE PAIN: 0
PSYCHIATRIC NEGATIVE: 1
ENDOCRINE NEGATIVE: 1
NAUSEA: 0
COUGH: 0
CHILLS: 0
BREAST MASS: 0
FEVER: 0
GASTROINTESTINAL NEGATIVE: 1
WEAKNESS: 0
SORE THROAT: 0
MUSCULOSKELETAL NEGATIVE: 1
CONSTITUTIONAL NEGATIVE: 1
PALPITATIONS: 0
PARESTHESIAS: 0
ABDOMINAL PAIN: 0
HEARTBURN: 0
RESPIRATORY NEGATIVE: 1
DIZZINESS: 0
SHORTNESS OF BREATH: 0
HEMATURIA: 0
DYSURIA: 0
ARTHRALGIAS: 0
DIARRHEA: 0
FREQUENCY: 0
EYES NEGATIVE: 1
JOINT SWELLING: 0

## 2018-12-20 ASSESSMENT — PAIN SCALES - GENERAL: PAINLEVEL: NO PAIN (0)

## 2018-12-20 ASSESSMENT — MIFFLIN-ST. JEOR: SCORE: 1393.93

## 2018-12-20 NOTE — PROGRESS NOTES
SUBJECTIVE:   CC: Shannon Olivarez is an 33 year old woman who presents for preventive health visit.     Physical   Annual:     Getting at least 3 servings of Calcium per day:  Yes    Bi-annual eye exam:  Yes    Dental care twice a year:  NO    Sleep apnea or symptoms of sleep apnea:  None    Diet:  Regular (no restrictions)    Frequency of exercise:  2-3 days/week    Duration of exercise:  15-30 minutes    Taking medications regularly:  Yes    Medication side effects:  None    Additional concerns today:  Yes    PHQ-2 Total Score: 0        Today's PHQ-2 Score:   PHQ-2 ( 1999 Pfizer) 12/20/2018   Q1: Little interest or pleasure in doing things 0   Q2: Feeling down, depressed or hopeless 0   PHQ-2 Score 0   Q1: Little interest or pleasure in doing things Not at all   Q2: Feeling down, depressed or hopeless Not at all   PHQ-2 Score 0       Abuse: Current or Past(Physical, Sexual or Emotional)- No  Do you feel safe in your environment? Yes    Social History     Tobacco Use     Smoking status: Never Smoker     Smokeless tobacco: Never Used   Substance Use Topics     Alcohol use: Yes     Alcohol Use 12/20/2018   If you drink alcohol do you typically have greater than 3 drinks per day OR greater than 7 drinks per week? No     Reviewed orders with patient.  Reviewed health maintenance and updated orders accordingly - Yes  Labs reviewed in EPIC  BP Readings from Last 3 Encounters:   12/20/18 118/72   03/12/18 (!) 149/92   12/07/17 102/80    Wt Readings from Last 3 Encounters:   12/20/18 68.5 kg (151 lb)   03/12/18 64 kg (141 lb)   12/07/17 63 kg (139 lb)                  Patient Active Problem List   Diagnosis     GERD (gastroesophageal reflux disease)     Acne     Spina bifida of sacral region without hydrocephalus (H)     Motion sickness     OAB (overactive bladder)     Encounter for surveillance of contraceptives     Chronic maxillary sinusitis     Nonintractable episodic headache     Congenital dysplasia of both hips      Past Surgical History:   Procedure Laterality Date     NO HISTORY OF SURGERY         Social History     Tobacco Use     Smoking status: Never Smoker     Smokeless tobacco: Never Used   Substance Use Topics     Alcohol use: Yes     Family History   Problem Relation Age of Onset     Allergies Mother      Asthma Mother      Allergies Father      Prostate Cancer Father      Cancer Maternal Grandfather         skin     Heart Disease Maternal Grandfather      Cardiovascular Maternal Grandfather      Lipids Maternal Grandfather      Other Cancer Maternal Grandfather         Lymphoma     Unknown/Adopted Paternal Grandmother      Unknown/Adopted Paternal Grandfather      Asthma Brother      Allergies Brother      Hypertension Maternal Grandmother      Cerebrovascular Disease Maternal Grandmother      Breast Cancer Other         Paternal Aunt     Breast Cancer Other         Paternal Aunt     Mental Illness Other         Maternal Uncle - Schizophrenia     Asthma Brother          Current Outpatient Medications   Medication Sig Dispense Refill     beclomethasone (QVAR) 80 MCG/ACT Inhaler 2 puffs into nasal passages once daily with adapter 1 Inhaler 1     FIBER PO Take by mouth daily as needed       ibuprofen (ADVIL,MOTRIN) 200 MG tablet Take 2 tablets by mouth 2 times daily as needed.       montelukast (SINGULAIR) 10 MG tablet Take 1 tablet (10 mg) by mouth At Bedtime 90 tablet 3     Multiple Vitamin (MULTI-VITAMIN) per tablet Take 1 tablet by mouth daily.       norgestim-eth estrad triphasic (ORTHO TRI-CYCLEN LO) 0.18/0.215/0.25 MG-25 MCG per tablet Take 1 tablet by mouth daily 84 tablet 0     oxybutynin (DITROPAN-XL) 5 MG 24 hr tablet Take 1 tablet (5 mg) by mouth daily 90 tablet 3     triamcinolone (NASACORT ALLERGY 24HR) 55 MCG/ACT nasal inhaler Spray 1 spray into both nostrils daily       Allergies   Allergen Reactions     Ceclor [Cefaclor] Swelling     throat     Erythromycin      Can't remember the reaction       Amoxicillin Rash     Suprax [Cefixime] Rash     Recent Labs   Lab Test 16  0837 12/15/14  0857 12  1528   * 136*  --    HDL 80 86  --    TRIG 113 180*  --    ALT 18  --   --    CR 0.66  --   --    GFRESTIMATED >90  Non  GFR Calc    --   --    GFRESTBLACK >90   GFR Calc    --   --    POTASSIUM 4.1  --   --    TSH  --   --  1.04        Mammogram not appropriate for this patient based on age.    Pertinent mammograms are reviewed under the imaging tab.  History of abnormal Pap smear: NO - age 30-65 PAP every 5 years with negative HPV co-testing recommended  PAP / HPV Latest Ref Rng & Units 2017 12/15/2014 2011   PAP - NIL NIL NIL   HPV 16 DNA NEG:Negative Negative - -   HPV 18 DNA NEG:Negative Negative - -   OTHER HR HPV NEG:Negative Negative - -     Reviewed and updated as needed this visit by clinical staff  Tobacco  Allergies  Meds  Med Hx  Surg Hx  Fam Hx  Soc Hx        Reviewed and updated as needed this visit by Provider          Past Medical History:   Diagnosis Date     Acne clear    sees derm Dr. Raygoza     Allergic rhinitis, cause unspecified     dr. joel GIBSON (allergic rhinitis)      High cholesterol      Hypertension 10/9/14    High blood pressure again noted at Urgent Care 14     Melanoma of scalp or neck (H)      Spina bifida (H)     seen on xray when in college      Past Surgical History:   Procedure Laterality Date     NO HISTORY OF SURGERY       Obstetric History       T0      L0     SAB0   TAB0   Ectopic0   Multiple0   Live Births0           Review of Systems   Constitutional: Negative.  Negative for chills and fever.   HENT: Positive for congestion. Negative for ear pain, hearing loss and sore throat.    Eyes: Negative.  Negative for pain and visual disturbance.   Respiratory: Negative.  Negative for cough and shortness of breath.    Cardiovascular: Negative.  Negative for chest pain, palpitations and peripheral  edema.   Gastrointestinal: Negative.  Negative for abdominal pain, constipation, diarrhea, heartburn, hematochezia and nausea.   Endocrine: Negative.    Breasts:  negative.  Negative for tenderness, breast mass and discharge.   Genitourinary: Negative.  Negative for dysuria, frequency, genital sores, hematuria, pelvic pain, urgency, vaginal bleeding and vaginal discharge.   Musculoskeletal: Negative.  Negative for arthralgias, joint swelling and myalgias.   Skin: Negative.  Negative for rash.   Allergic/Immunologic: Negative.    Neurological: Positive for headaches. Negative for dizziness, weakness and paresthesias.   Hematological: Negative.    Psychiatric/Behavioral: Negative.  Negative for mood changes. The patient is not nervous/anxious.           OBJECTIVE:   There were no vitals taken for this visit.  Physical Exam   Constitutional: She is oriented to person, place, and time. She appears well-developed and well-nourished. No distress.   HENT:   Right Ear: Tympanic membrane and external ear normal.   Left Ear: Tympanic membrane and external ear normal.   Nose: Nose normal.   Mouth/Throat: Oropharynx is clear and moist. No oropharyngeal exudate.   Eyes: Conjunctivae are normal. Pupils are equal, round, and reactive to light. Right eye exhibits no discharge. Left eye exhibits no discharge.   Neck: Neck supple. No tracheal deviation present. No thyromegaly present.   Cardiovascular: Normal rate, regular rhythm, S1 normal, S2 normal, normal heart sounds and normal pulses. Exam reveals no S3, no S4 and no friction rub.   No murmur heard.  Pulmonary/Chest: Effort normal and breath sounds normal. No respiratory distress. She has no wheezes. She has no rales.   Abdominal: Soft. Bowel sounds are normal. She exhibits no mass. There is no hepatosplenomegaly. There is no tenderness.   Musculoskeletal: Normal range of motion. She exhibits no edema.   Lymphadenopathy:     She has no cervical adenopathy.   Neurological: She  "is alert and oriented to person, place, and time. She has normal strength and normal reflexes. She exhibits normal muscle tone.   Skin: Skin is warm and dry. No rash noted.   Psychiatric: She has a normal mood and affect. Judgment and thought content normal. Cognition and memory are normal.       Diagnostic Test Results:  none     ASSESSMENT/PLAN:     Problem List Items Addressed This Visit     Spina bifida of sacral region without hydrocephalus (H)     Found incidentally on eval for back pain at the age of 22 yr. Has neurogenic bladder          OAB (overactive bladder)     Likely neurogenic bladder. Uses ditropan. Refill meds         Encounter for surveillance of contraceptives    Relevant Medications    norgestim-eth estrad triphasic (ORTHO TRI-CYCLEN LO) 0.18/0.215/0.25 MG-25 MCG tablet    Nonintractable episodic headache     Likely menstual migraines  Trial Imitex  Advised headache log  Recheck in 3 months         Relevant Medications    SUMAtriptan (IMITREX) 50 MG tablet    Congenital dysplasia of both hips     Diagnosed recently. On PT currently           Other Visit Diagnoses     Screening for lipoid disorders    -  Primary    Relevant Orders    Lipid Profile (Chol, Trig, HDL, LDL calc)    Urinary frequency        Relevant Medications    oxybutynin ER (DITROPAN-XL) 5 MG 24 hr tablet    Screening for diabetes mellitus        Relevant Orders    Glucose    Neurogenic bladder        Relevant Orders    Comprehensive metabolic panel    Encounter for routine adult medical exam with abnormal findings              COUNSELING:  Reviewed preventive health counseling, as reflected in patient instructions       Regular exercise       Healthy diet/nutrition    BP Readings from Last 1 Encounters:   03/12/18 (!) 149/92     Estimated body mass index is 23.61 kg/m  as calculated from the following:    Height as of 12/7/17: 1.646 m (5' 4.8\").    Weight as of 3/12/18: 64 kg (141 lb).           reports that  has never smoked. she " has never used smokeless tobacco.      Counseling Resources:  ATP IV Guidelines  Pooled Cohorts Equation Calculator  Breast Cancer Risk Calculator  FRAX Risk Assessment  ICSI Preventive Guidelines  Dietary Guidelines for Americans, 2010  USDA's MyPlate  ASA Prophylaxis  Lung CA Screening    Nicki Clay MD  [facility]

## 2019-01-03 ENCOUNTER — MYC REFILL (OUTPATIENT)
Dept: FAMILY MEDICINE | Facility: OTHER | Age: 34
End: 2019-01-03

## 2019-01-03 DIAGNOSIS — J30.1 CHRONIC SEASONAL ALLERGIC RHINITIS DUE TO POLLEN: ICD-10-CM

## 2019-01-07 RX ORDER — MONTELUKAST SODIUM 10 MG/1
10 TABLET ORAL AT BEDTIME
Qty: 90 TABLET | Refills: 3 | Status: SHIPPED | OUTPATIENT
Start: 2019-01-07 | End: 2020-01-14

## 2019-01-07 NOTE — TELEPHONE ENCOUNTER
"Requested Prescriptions   Pending Prescriptions Disp Refills     montelukast (SINGULAIR) 10 MG tablet 90 tablet 3     Sig: Take 1 tablet (10 mg) by mouth At Bedtime    Leukotriene Inhibitors Protocol Passed - 1/7/2019 11:11 AM       Passed - Patient is age 12 or older    If patient is under 16, ok to refill using age based dosing.          Passed - Recent (12 mo) or future (30 days) visit within the authorizing provider's specialty    Patient had office visit in the last 12 months or has a visit in the next 30 days with authorizing provider or within the authorizing provider's specialty.  See \"Patient Info\" tab in inbasket, or \"Choose Columns\" in Meds & Orders section of the refill encounter.             Passed - Medication is active on med list        Last ov 12/20/2018  Last filled 12/07/2017  .Prescription approved per [facility] Refill Protocol.  Yohan Ruiz, RN, BSN          "

## 2019-02-18 ENCOUNTER — MYC REFILL (OUTPATIENT)
Dept: FAMILY MEDICINE | Facility: OTHER | Age: 34
End: 2019-02-18

## 2019-02-18 DIAGNOSIS — R51.9 NONINTRACTABLE EPISODIC HEADACHE, UNSPECIFIED HEADACHE TYPE: ICD-10-CM

## 2019-02-19 RX ORDER — SUMATRIPTAN 50 MG/1
50 TABLET, FILM COATED ORAL
Qty: 18 TABLET | Refills: 3 | OUTPATIENT
Start: 2019-02-19

## 2019-02-19 NOTE — TELEPHONE ENCOUNTER
"Requested Prescriptions   Pending Prescriptions Disp Refills     SUMAtriptan (IMITREX) 50 MG tablet 18 tablet 3     Sig: Take 1 tablet (50 mg) by mouth at onset of headache for migraine Can repeat a dose in 2 hrs if no relief. No more than 200mg in 24 hrs    Serotonin Agonists Failed - 2/18/2019 12:38 PM       Failed - Serotonin Agonist request needs review.    Please review patient's record. If patient has had 8 or more treatments in the past month, please forward to provider.         Passed - Blood pressure under 140/90 in past 12 months    BP Readings from Last 3 Encounters:   12/20/18 118/72   03/12/18 (!) 149/92   12/07/17 102/80          Passed - Recent (12 mo) or future (30 days) visit within the authorizing provider's specialty    Patient had office visit in the last 12 months or has a visit in the next 30 days with authorizing provider or within the authorizing provider's specialty.  See \"Patient Info\" tab in inbasket, or \"Choose Columns\" in Meds & Orders section of the refill encounter.         Passed - Medication is active on med list       Passed - Patient is age 18 or older       Passed - No active pregnancy on record       Passed - No positive pregnancy test in past 12 months      last filled 12/20/2018  Last seen 12/20/2018 should have refills left from this order    Yohan Ruiz, RN, BSN          "

## 2019-03-12 DIAGNOSIS — Z13.220 SCREENING FOR LIPOID DISORDERS: ICD-10-CM

## 2019-03-12 DIAGNOSIS — Z13.1 SCREENING FOR DIABETES MELLITUS: ICD-10-CM

## 2019-03-12 DIAGNOSIS — N31.9 NEUROGENIC BLADDER: ICD-10-CM

## 2019-03-12 LAB
ALBUMIN SERPL-MCNC: 3.7 G/DL (ref 3.4–5)
ALP SERPL-CCNC: 52 U/L (ref 40–150)
ALT SERPL W P-5'-P-CCNC: 24 U/L (ref 0–50)
ANION GAP SERPL CALCULATED.3IONS-SCNC: 4 MMOL/L (ref 3–14)
AST SERPL W P-5'-P-CCNC: 15 U/L (ref 0–45)
BILIRUB SERPL-MCNC: 0.5 MG/DL (ref 0.2–1.3)
BUN SERPL-MCNC: 8 MG/DL (ref 7–30)
CALCIUM SERPL-MCNC: 8.9 MG/DL (ref 8.5–10.1)
CHLORIDE SERPL-SCNC: 104 MMOL/L (ref 94–109)
CHOLEST SERPL-MCNC: 207 MG/DL
CO2 SERPL-SCNC: 30 MMOL/L (ref 20–32)
CREAT SERPL-MCNC: 0.79 MG/DL (ref 0.52–1.04)
GFR SERPL CREATININE-BSD FRML MDRD: >90 ML/MIN/{1.73_M2}
GLUCOSE SERPL-MCNC: 87 MG/DL (ref 70–99)
HDLC SERPL-MCNC: 96 MG/DL
LDLC SERPL CALC-MCNC: 93 MG/DL
NONHDLC SERPL-MCNC: 111 MG/DL
POTASSIUM SERPL-SCNC: 4.2 MMOL/L (ref 3.4–5.3)
PROT SERPL-MCNC: 7.4 G/DL (ref 6.8–8.8)
SODIUM SERPL-SCNC: 138 MMOL/L (ref 133–144)
TRIGL SERPL-MCNC: 90 MG/DL

## 2019-03-12 PROCEDURE — 80061 LIPID PANEL: CPT | Performed by: FAMILY MEDICINE

## 2019-03-12 PROCEDURE — 36415 COLL VENOUS BLD VENIPUNCTURE: CPT | Performed by: FAMILY MEDICINE

## 2019-03-12 PROCEDURE — 80053 COMPREHEN METABOLIC PANEL: CPT | Performed by: FAMILY MEDICINE

## 2019-03-19 ENCOUNTER — MYC MEDICAL ADVICE (OUTPATIENT)
Dept: FAMILY MEDICINE | Facility: OTHER | Age: 34
End: 2019-03-19

## 2019-09-05 NOTE — PROGRESS NOTES
"Subjective     Shannon Olivarez is a 33 year old female who presents to clinic today for the following health issues:    History of Present Illness        Migraines:   Since the patient's last clinic visit, headaches are: no change  The patient is getting headaches:  About 2-3 times a month  She is not able to do normal daily activities when she has a migraine.  The patient is taking the following rescue/relief medications:  Sumatriptan (Imitrex)   Patient states \"I get total relief\" from the rescue/relief medications.   The patient is taking the following medications to prevent migraines:  No medications to prevent migraines  In the past 4 weeks, the patient has gone to an Urgent Care or Emergency Room 0 times times due to headaches.    She eats 0-1 servings of fruits and vegetables daily.She consumes 1 sweetened beverage(s) daily.  She is taking medications regularly.     Medication Followup of Oxybutynin     Taking Medication as prescribed: yes    Side Effects:  Constipation     Medication Helping Symptoms: Sometimes, Patient states that it is not working as well as it once did.      -------------------------------------    Patient Active Problem List   Diagnosis     GERD (gastroesophageal reflux disease)     Acne     Spina bifida of sacral region without hydrocephalus (H)     Motion sickness     OAB (overactive bladder)     Encounter for surveillance of contraceptives     Chronic maxillary sinusitis     Nonintractable episodic headache     Congenital dysplasia of both hips     Past Surgical History:   Procedure Laterality Date     NO HISTORY OF SURGERY         Social History     Tobacco Use     Smoking status: Never Smoker     Smokeless tobacco: Never Used   Substance Use Topics     Alcohol use: Yes     Family History   Problem Relation Age of Onset     Allergies Mother      Asthma Mother      Allergies Father      Prostate Cancer Father      Cancer Maternal Grandfather         skin     Heart Disease Maternal " Grandfather      Cardiovascular Maternal Grandfather      Lipids Maternal Grandfather      Other Cancer Maternal Grandfather         Lymphoma     Unknown/Adopted Paternal Grandmother      Unknown/Adopted Paternal Grandfather      Asthma Brother      Allergies Brother      Hypertension Maternal Grandmother      Cerebrovascular Disease Maternal Grandmother      Breast Cancer Other         Paternal Aunt     Breast Cancer Other         Paternal Aunt     Mental Illness Other         Maternal Uncle - Schizophrenia     Asthma Brother          Current Outpatient Medications   Medication Sig Dispense Refill     FIBER PO Take by mouth daily as needed       ibuprofen (ADVIL,MOTRIN) 200 MG tablet Take 2 tablets by mouth 2 times daily as needed.       montelukast (SINGULAIR) 10 MG tablet Take 1 tablet (10 mg) by mouth At Bedtime 90 tablet 3     Multiple Vitamin (MULTI-VITAMIN) per tablet Take 1 tablet by mouth daily.       norgestim-eth estrad triphasic (ORTHO TRI-CYCLEN LO) 0.18/0.215/0.25 MG-25 MCG tablet Take 1 tablet by mouth daily 84 tablet 3     oxybutynin ER (DITROPAN-XL) 5 MG 24 hr tablet Take 2 tablets (10 mg) by mouth daily 180 tablet 0     SUMAtriptan (IMITREX) 100 MG tablet Take 1 tablet (100 mg) by mouth at onset of headache for migraine Can repeat a dose in 2 hrs if no relief. No more than 200mg in 24 hrs 18 tablet 2     triamcinolone (NASACORT ALLERGY 24HR) 55 MCG/ACT nasal inhaler Spray 1 spray into both nostrils daily       beclomethasone (QVAR) 80 MCG/ACT Inhaler 2 puffs into nasal passages once daily with adapter (Patient not taking: Reported on 9/10/2019) 1 Inhaler 1     Allergies   Allergen Reactions     Ceclor [Cefaclor] Swelling     throat     Erythromycin      Can't remember the reaction      Amoxicillin Rash     Suprax [Cefixime] Rash     BP Readings from Last 3 Encounters:   09/10/19 114/70   12/20/18 118/72   03/12/18 (!) 149/92    Wt Readings from Last 3 Encounters:   09/10/19 65.3 kg (144 lb)  "  12/20/18 68.5 kg (151 lb)   03/12/18 64 kg (141 lb)                    Reviewed and updated as needed this visit by Provider         Review of Systems   ROS COMP: Constitutional, HEENT, cardiovascular, pulmonary, gi and gu systems are negative, except as otherwise noted.      Objective    /70 (BP Location: Left arm, Patient Position: Chair, Cuff Size: Adult Regular)   Pulse 72   Temp 98.2  F (36.8  C) (Temporal)   Resp 16   Ht 1.651 m (5' 5\")   Wt 65.3 kg (144 lb)   LMP 08/10/2019 (Approximate)   SpO2 98%   Breastfeeding? No   BMI 23.96 kg/m    Body mass index is 23.96 kg/m .  Physical Exam   Constitutional: She appears well-developed and well-nourished.   HENT:   Head: Normocephalic and atraumatic.   Eyes: EOM are normal.   Cardiovascular: Normal rate, regular rhythm, normal heart sounds and intact distal pulses. Exam reveals no gallop and no friction rub.   No murmur heard.  Psychiatric: She has a normal mood and affect. Her behavior is normal. Judgment and thought content normal.          Diagnostic Test Results:  Labs reviewed in Epic        Assessment & Plan   Problem List Items Addressed This Visit     Spina bifida of sacral region without hydrocephalus (H)    OAB (overactive bladder)     Has had accidents with urine atleast 3 days out of 7 in a week. Trial increased dose of ditropan to see if that helps symptoms          Nonintractable episodic headache     Symptoms well controlled on imitrex. Refill meds         Relevant Medications    SUMAtriptan (IMITREX) 100 MG tablet      Other Visit Diagnoses     Need for prophylactic vaccination and inoculation against influenza    -  Primary    Relevant Orders     FLU VAC PRESRV FREE QUAD SPLIT VIR > 6 MONTHS IM [70955] (Completed)    Vaccine Administration, Initial [42877] (Completed)    Urinary frequency        Relevant Medications    oxybutynin ER (DITROPAN-XL) 5 MG 24 hr tablet               See Patient Instructions  Return in about 3 months " (around 12/10/2019) for Physical Exam.    Nicki Clay MD  [facility]

## 2019-09-10 ENCOUNTER — OFFICE VISIT (OUTPATIENT)
Dept: FAMILY MEDICINE | Facility: OTHER | Age: 34
End: 2019-09-10
Payer: COMMERCIAL

## 2019-09-10 VITALS
SYSTOLIC BLOOD PRESSURE: 114 MMHG | OXYGEN SATURATION: 98 % | HEART RATE: 72 BPM | DIASTOLIC BLOOD PRESSURE: 70 MMHG | WEIGHT: 144 LBS | BODY MASS INDEX: 23.99 KG/M2 | RESPIRATION RATE: 16 BRPM | TEMPERATURE: 98.2 F | HEIGHT: 65 IN

## 2019-09-10 DIAGNOSIS — R35.0 URINARY FREQUENCY: ICD-10-CM

## 2019-09-10 DIAGNOSIS — R51.9 NONINTRACTABLE EPISODIC HEADACHE, UNSPECIFIED HEADACHE TYPE: ICD-10-CM

## 2019-09-10 DIAGNOSIS — Z23 NEED FOR PROPHYLACTIC VACCINATION AND INOCULATION AGAINST INFLUENZA: Primary | ICD-10-CM

## 2019-09-10 DIAGNOSIS — N32.81 OAB (OVERACTIVE BLADDER): ICD-10-CM

## 2019-09-10 DIAGNOSIS — Q05.8 SPINA BIFIDA OF SACRAL REGION WITHOUT HYDROCEPHALUS (H): ICD-10-CM

## 2019-09-10 PROCEDURE — 90471 IMMUNIZATION ADMIN: CPT | Performed by: FAMILY MEDICINE

## 2019-09-10 PROCEDURE — 99214 OFFICE O/P EST MOD 30 MIN: CPT | Mod: 25 | Performed by: FAMILY MEDICINE

## 2019-09-10 PROCEDURE — 90686 IIV4 VACC NO PRSV 0.5 ML IM: CPT | Performed by: FAMILY MEDICINE

## 2019-09-10 RX ORDER — SUMATRIPTAN 100 MG/1
100 TABLET, FILM COATED ORAL
Qty: 18 TABLET | Refills: 2 | Status: SHIPPED | OUTPATIENT
Start: 2019-09-10 | End: 2020-01-14

## 2019-09-10 RX ORDER — OXYBUTYNIN CHLORIDE 5 MG/1
10 TABLET, EXTENDED RELEASE ORAL DAILY
Qty: 180 TABLET | Refills: 0 | Status: SHIPPED | OUTPATIENT
Start: 2019-09-10 | End: 2020-01-14

## 2019-09-10 ASSESSMENT — MIFFLIN-ST. JEOR: SCORE: 1369.06

## 2019-09-10 NOTE — ASSESSMENT & PLAN NOTE
Has had accidents with urine atleast 3 days out of 7 in a week. Trial increased dose of ditropan to see if that helps symptoms

## 2019-09-10 NOTE — ASSESSMENT & PLAN NOTE
This encounter was created in error - please disregard.   Symptoms well controlled on imitrex. Refill meds

## 2019-09-23 NOTE — PROGRESS NOTES
Subjective     Shannon Olivarez is a 33 year old female who presents to clinic today for the following health issues:    HPI     Acute Illness   Acute illness concerns: Cough  Onset: x 1 month    Fever: no    Chills/Sweats: no    Headache (location?): YES    Sinus Pressure:no    Conjunctivitis:  no    Ear Pain: no    Rhinorrhea: YES    Congestion: YES    Sore Throat: YES- from drainage      Cough: YES-productive of clear sputum    Wheeze: no    Decreased Appetite: no    Nausea: no    Vomiting: no    Diarrhea:  no    Dysuria/Freq.: no    Fatigue/Achiness: no    Sick/Strep Exposure: no     Therapies Tried and outcome: Dayquil, Nyquil, Robitussin, Sudafed     -------------------------------------    Patient Active Problem List   Diagnosis     GERD (gastroesophageal reflux disease)     Acne     Spina bifida of sacral region without hydrocephalus (H)     Motion sickness     OAB (overactive bladder)     Encounter for surveillance of contraceptives     Chronic maxillary sinusitis     Nonintractable episodic headache     Congenital dysplasia of both hips     Past Surgical History:   Procedure Laterality Date     NO HISTORY OF SURGERY         Social History     Tobacco Use     Smoking status: Never Smoker     Smokeless tobacco: Never Used   Substance Use Topics     Alcohol use: Yes     Family History   Problem Relation Age of Onset     Allergies Mother      Asthma Mother      Allergies Father      Prostate Cancer Father      Cancer Maternal Grandfather         skin     Heart Disease Maternal Grandfather      Cardiovascular Maternal Grandfather      Lipids Maternal Grandfather      Other Cancer Maternal Grandfather         Lymphoma     Unknown/Adopted Paternal Grandmother      Unknown/Adopted Paternal Grandfather      Asthma Brother      Allergies Brother      Hypertension Maternal Grandmother      Cerebrovascular Disease Maternal Grandmother      Breast Cancer Other         Paternal Aunt     Breast Cancer Other          Paternal Aunt     Mental Illness Other         Maternal Uncle - Schizophrenia     Asthma Brother          Current Outpatient Medications   Medication Sig Dispense Refill     FIBER PO Take by mouth daily as needed       ibuprofen (ADVIL,MOTRIN) 200 MG tablet Take 2 tablets by mouth 2 times daily as needed.       montelukast (SINGULAIR) 10 MG tablet Take 1 tablet (10 mg) by mouth At Bedtime 90 tablet 3     Multiple Vitamin (MULTI-VITAMIN) per tablet Take 1 tablet by mouth daily.       norgestim-eth estrad triphasic (ORTHO TRI-CYCLEN LO) 0.18/0.215/0.25 MG-25 MCG tablet Take 1 tablet by mouth daily 84 tablet 3     oxybutynin ER (DITROPAN-XL) 5 MG 24 hr tablet Take 2 tablets (10 mg) by mouth daily 180 tablet 0     predniSONE (DELTASONE) 20 MG tablet Take 2 tablets (40 mg) by mouth daily for 5 days 10 tablet 0     SUMAtriptan (IMITREX) 100 MG tablet Take 1 tablet (100 mg) by mouth at onset of headache for migraine Can repeat a dose in 2 hrs if no relief. No more than 200mg in 24 hrs 18 tablet 2     triamcinolone (NASACORT ALLERGY 24HR) 55 MCG/ACT nasal inhaler Spray 1 spray into both nostrils daily       beclomethasone (QVAR) 80 MCG/ACT Inhaler 2 puffs into nasal passages once daily with adapter (Patient not taking: Reported on 9/10/2019) 1 Inhaler 1     Allergies   Allergen Reactions     Ceclor [Cefaclor] Swelling     throat     Erythromycin      Can't remember the reaction      Amoxicillin Rash     Suprax [Cefixime] Rash     BP Readings from Last 3 Encounters:   09/24/19 120/70   09/10/19 114/70   12/20/18 118/72    Wt Readings from Last 3 Encounters:   09/24/19 64.9 kg (143 lb)   09/10/19 65.3 kg (144 lb)   12/20/18 68.5 kg (151 lb)                    Reviewed and updated as needed this visit by Provider         Review of Systems   ROS COMP: Constitutional, HEENT, cardiovascular, pulmonary, gi and gu systems are negative, except as otherwise noted.      Objective    /70 (BP Location: Right arm, Patient Position:  "Chair, Cuff Size: Adult Regular)   Pulse 72   Temp 97.7  F (36.5  C) (Temporal)   Resp 16   Ht 1.651 m (5' 5\")   Wt 64.9 kg (143 lb)   SpO2 99%   BMI 23.80 kg/m    Body mass index is 23.8 kg/m .  Physical Exam  Constitutional:       General: She is not in acute distress.     Appearance: Normal appearance. She is not ill-appearing, toxic-appearing or diaphoretic.   HENT:      Head: Normocephalic and atraumatic.      Right Ear: Tympanic membrane, ear canal and external ear normal. There is no impacted cerumen.      Left Ear: Tympanic membrane, ear canal and external ear normal. There is no impacted cerumen.      Nose: Rhinorrhea present.      Mouth/Throat:      Pharynx: No oropharyngeal exudate or posterior oropharyngeal erythema.   Eyes:      Extraocular Movements: Extraocular movements intact.      Pupils: Pupils are equal, round, and reactive to light.   Neck:      Musculoskeletal: Normal range of motion and neck supple.   Cardiovascular:      Rate and Rhythm: Normal rate and regular rhythm.      Pulses: Normal pulses.      Heart sounds: Normal heart sounds.   Pulmonary:      Effort: Pulmonary effort is normal. No respiratory distress.      Breath sounds: Normal breath sounds. No stridor. No wheezing, rhonchi or rales.   Chest:      Chest wall: No tenderness.   Neurological:      Mental Status: She is alert.   Psychiatric:         Mood and Affect: Mood normal.         Behavior: Behavior normal.         Thought Content: Thought content normal.         Judgement: Judgment normal.            Diagnostic Test Results:  Labs reviewed in Epic        Assessment & Plan   Problem List Items Addressed This Visit     None      Visit Diagnoses     Cough    -  Primary    Relevant Medications    predniSONE (DELTASONE) 20 MG tablet    Viral URI        Mild intermittent reactive airway disease with acute exacerbation             Symptoms likely consistent with possible viral URI   Trial prednisone burst to help with " inflammation. No concern for bacterial infection  Recheck in 1-2 weeks if symptoms do not resolve      See Patient Instructions  Return in about 2 months (around 11/24/2019), or if symptoms worsen or fail to improve.    Nicki Clay MD  [facility]

## 2019-09-24 ENCOUNTER — OFFICE VISIT (OUTPATIENT)
Dept: FAMILY MEDICINE | Facility: OTHER | Age: 34
End: 2019-09-24
Payer: COMMERCIAL

## 2019-09-24 VITALS
WEIGHT: 143 LBS | SYSTOLIC BLOOD PRESSURE: 120 MMHG | RESPIRATION RATE: 16 BRPM | TEMPERATURE: 97.7 F | BODY MASS INDEX: 23.82 KG/M2 | HEIGHT: 65 IN | OXYGEN SATURATION: 99 % | DIASTOLIC BLOOD PRESSURE: 70 MMHG | HEART RATE: 72 BPM

## 2019-09-24 DIAGNOSIS — R05.9 COUGH: Primary | ICD-10-CM

## 2019-09-24 DIAGNOSIS — J06.9 VIRAL URI: ICD-10-CM

## 2019-09-24 DIAGNOSIS — J45.21 MILD INTERMITTENT REACTIVE AIRWAY DISEASE WITH ACUTE EXACERBATION: ICD-10-CM

## 2019-09-24 PROCEDURE — 99213 OFFICE O/P EST LOW 20 MIN: CPT | Performed by: FAMILY MEDICINE

## 2019-09-24 RX ORDER — PREDNISONE 20 MG/1
40 TABLET ORAL DAILY
Qty: 10 TABLET | Refills: 0 | Status: SHIPPED | OUTPATIENT
Start: 2019-09-24 | End: 2019-09-29

## 2019-09-24 ASSESSMENT — MIFFLIN-ST. JEOR: SCORE: 1364.52

## 2019-09-24 ASSESSMENT — PAIN SCALES - GENERAL: PAINLEVEL: NO PAIN (0)

## 2019-10-07 ENCOUNTER — TELEPHONE (OUTPATIENT)
Dept: FAMILY MEDICINE | Facility: OTHER | Age: 34
End: 2019-10-07

## 2019-10-07 NOTE — LETTER
[facility]  [address]   [facility] [address]  Phone: [phone]  October 7, 2019      Shannon Olivarez  [address] [place] [address]      Dear Shannon,    We care about your health and have reviewed your health plan including your medical conditions, medications, and lab results.  Based on this review, it is recommended that you follow up regarding the following health topic(s):  -Asthma    We recommend you take the following action(s):   -Complete and return the attached ASTHMA CONTROL TEST.  If your total score is 19 or less or you have been to the ER or urgent care for your asthma, then please schedule an asthma followup appointment.     Please call us at the [facility] - [phone] (or use [employer]) to address the above recommendations.     Thank you for trusting [facility] and we appreciate the opportunity to serve you.  We look forward to supporting your healthcare needs in the future.    Healthy Regards,    Your Health Care Team  [facility]

## 2019-10-07 NOTE — TELEPHONE ENCOUNTER
Summary:    Patient is due/failing the following:   ACT    Action needed:   Patient needs to do ACT.    Type of outreach:    Sent letter.    Questions for provider review:    None                                                                                                                                    Alberta Luque CMA       Chart routed to Care Team .      Panel Management Review      Patient has the following on her problem list:   Asthma review   No flowsheet data found.   1. Is Asthma diagnosis on the Problem List? Yes    2. Is Asthma listed on Health Maintenance? Yes    3. Patient is due for:  ACT      Composite cancer screening  Chart review shows that this patient is due/due soon for the following None

## 2019-12-03 DIAGNOSIS — Z30.41 ENCOUNTER FOR SURVEILLANCE OF CONTRACEPTIVE PILLS: ICD-10-CM

## 2019-12-04 RX ORDER — NORGESTIMATE AND ETHINYL ESTRADIOL 7DAYSX3 LO
1 KIT ORAL DAILY
Qty: 84 TABLET | Refills: 3 | Status: SHIPPED | OUTPATIENT
Start: 2019-12-04 | End: 2020-01-14

## 2019-12-04 NOTE — TELEPHONE ENCOUNTER
Prescription approved per [facility] Refill Protocol.    Charu Pitt, RN, BSN

## 2020-01-09 NOTE — PROGRESS NOTES
Subjective     Shannon Olivarez is a 34 year old female who presents to clinic today for the following health issues:    History of Present Illness        She eats 2-3 servings of fruits and vegetables daily.She consumes 1 sweetened beverage(s) daily.  She is not taking prescribed medications regularly due to None.  Healthy Habits:     Getting at least 3 servings of Calcium per day:  Yes    Bi-annual eye exam:  Yes    Dental care twice a year:  NO    Sleep apnea or symptoms of sleep apnea:  None    Diet:  Regular (no restrictions)    Frequency of exercise:  1 day/week    Duration of exercise:  15-30 minutes    Taking medications regularly:  Yes    Barriers to taking medications:  None    Medication side effects:  Other    PHQ-2 Total Score: 0    Additional concerns today:  Yes     Concern - sternum discomfort  Onset: 2 months    Description:   Discomfort when she presses on it or takes deep breath    Intensity: mild    Progression of Symptoms:  improving    Accompanying Signs & Symptoms:  Denies being SOB just discomfort at times    Previous history of similar problem:   no    Precipitating factors:   Worsened by: nothing    Alleviating factors:  Improved by: nothing    Therapies Tried and outcome: nothing  -------------------------------------    Patient Active Problem List   Diagnosis     Spina bifida of sacral region without hydrocephalus (H)     OAB (overactive bladder)     Chronic maxillary sinusitis     Nonintractable episodic headache     Congenital dysplasia of both hips     Costochondritis     Past Surgical History:   Procedure Laterality Date     NO HISTORY OF SURGERY         Social History     Tobacco Use     Smoking status: Never Smoker     Smokeless tobacco: Never Used   Substance Use Topics     Alcohol use: Yes     Family History   Problem Relation Age of Onset     Allergies Mother      Asthma Mother      Allergies Father      Prostate Cancer Father      Cancer Maternal Grandfather         skin      Heart Disease Maternal Grandfather      Cardiovascular Maternal Grandfather      Lipids Maternal Grandfather      Other Cancer Maternal Grandfather         Lymphoma     Unknown/Adopted Paternal Grandmother      Unknown/Adopted Paternal Grandfather      Asthma Brother      Allergies Brother      Hypertension Maternal Grandmother      Cerebrovascular Disease Maternal Grandmother      Breast Cancer Other         Paternal Aunt     Breast Cancer Other         Paternal Aunt     Mental Illness Other         Maternal Uncle - Schizophrenia     Asthma Brother          Current Outpatient Medications   Medication Sig Dispense Refill     beclomethasone (QVAR) 80 MCG/ACT Inhaler 2 puffs into nasal passages once daily with adapter 1 Inhaler 1     FIBER PO Take by mouth daily as needed       montelukast (SINGULAIR) 10 MG tablet Take 1 tablet (10 mg) by mouth At Bedtime 90 tablet 3     Multiple Vitamin (MULTI-VITAMIN) per tablet Take 1 tablet by mouth daily.       oxybutynin ER (DITROPAN-XL) 5 MG 24 hr tablet Take 2 tablets (10 mg) by mouth daily 180 tablet 3     SUMAtriptan (IMITREX) 100 MG tablet Take 1 tablet (100 mg) by mouth at onset of headache for migraine Can repeat a dose in 2 hrs if no relief. No more than 200mg in 24 hrs 18 tablet 11     triamcinolone (NASACORT ALLERGY 24HR) 55 MCG/ACT nasal inhaler Spray 1 spray into both nostrils daily       ibuprofen (ADVIL,MOTRIN) 200 MG tablet Take 2 tablets by mouth 2 times daily as needed.       Allergies   Allergen Reactions     Ceclor [Cefaclor] Swelling     throat     Erythromycin      Can't remember the reaction      Amoxicillin Rash     Suprax [Cefixime] Rash     Recent Labs   Lab Test 03/12/19  1025 09/29/16  0837 12/15/14  0857 03/29/12  1528   LDL 93 132* 136*  --    HDL 96 80 86  --    TRIG 90 113 180*  --    ALT 24 18  --   --    CR 0.79 0.66  --   --    GFRESTIMATED >90 >90  Non African American GFR Calc    --   --    GFRESTBLACK >90 >90  African American GFR Calc    --   " --    POTASSIUM 4.2 4.1  --   --    TSH  --   --   --  1.04      BP Readings from Last 3 Encounters:   01/14/20 102/72   09/24/19 120/70   09/10/19 114/70    Wt Readings from Last 3 Encounters:   01/14/20 66.2 kg (146 lb)   09/24/19 64.9 kg (143 lb)   09/10/19 65.3 kg (144 lb)                    Reviewed and updated as needed this visit by Provider  Tobacco  Allergies  Meds  Problems  Med Hx  Surg Hx  Fam Hx         Review of Systems   ROS COMP: Constitutional, HEENT, cardiovascular, pulmonary, GI, , musculoskeletal, neuro, skin, endocrine and psych systems are negative, except as otherwise noted.      Objective    /72   Pulse 90   Temp 98.1  F (36.7  C) (Temporal)   Resp 12   Ht 1.653 m (5' 5.08\")   Wt 66.2 kg (146 lb)   LMP 01/01/2020 (Exact Date)   SpO2 98%   BMI 24.24 kg/m    Body mass index is 24.24 kg/m .  Physical Exam  Constitutional:       General: She is not in acute distress.     Appearance: Normal appearance. She is well-developed and normal weight. She is not ill-appearing, toxic-appearing or diaphoretic.   HENT:      Head: Normocephalic and atraumatic.      Right Ear: Tympanic membrane, ear canal and external ear normal.      Left Ear: Tympanic membrane, ear canal and external ear normal.      Nose: No rhinorrhea.      Mouth/Throat:      Mouth: Mucous membranes are moist.      Pharynx: Oropharynx is clear. No oropharyngeal exudate.   Eyes:      Extraocular Movements: Extraocular movements intact.      Conjunctiva/sclera: Conjunctivae normal.      Pupils: Pupils are equal, round, and reactive to light.   Neck:      Musculoskeletal: Normal range of motion and neck supple.   Cardiovascular:      Rate and Rhythm: Normal rate and regular rhythm.      Pulses: Normal pulses.      Heart sounds: Normal heart sounds. No murmur. No friction rub. No gallop.    Pulmonary:      Effort: Pulmonary effort is normal. No respiratory distress.      Breath sounds: Normal breath sounds. No stridor. " No wheezing, rhonchi or rales.   Chest:      Chest wall: No tenderness.   Abdominal:      General: Bowel sounds are normal. There is no distension.      Palpations: Abdomen is soft. There is no mass.      Tenderness: There is no abdominal tenderness. There is no right CVA tenderness, left CVA tenderness, guarding or rebound.      Hernia: No hernia is present.   Musculoskeletal: Normal range of motion.         General: No tenderness.   Neurological:      General: No focal deficit present.      Mental Status: She is alert and oriented to person, place, and time.   Psychiatric:         Mood and Affect: Mood normal.         Behavior: Behavior normal.         Thought Content: Thought content normal.         Judgment: Judgment normal.            Diagnostic Test Results:  Labs reviewed in Epic        Assessment & Plan   Problem List Items Addressed This Visit     Spina bifida of sacral region without hydrocephalus (H)    OAB (overactive bladder)     Symptoms well controlled on the current dose of oxybutynin.  Refill medications.         Nonintractable episodic headache     Moderately well-controlled on Imitrex as needed.  Recheck labs.  Refill meds.  Follow-up in 6 months to a year.         Relevant Medications    SUMAtriptan (IMITREX) 100 MG tablet    Other Relevant Orders    CBC with platelets    TSH with free T4 reflex    Comprehensive metabolic panel    Costochondritis     Discuss treatment with NSAIDs.  Reassured.           Other Visit Diagnoses     Screening for lipoid disorders    -  Primary    Relevant Orders    Lipid panel reflex to direct LDL Fasting    Chronic seasonal allergic rhinitis due to pollen        Relevant Medications    montelukast (SINGULAIR) 10 MG tablet    Urinary frequency        Relevant Medications    oxybutynin ER (DITROPAN-XL) 5 MG 24 hr tablet               See Patient Instructions  Return in about 1 year (around 1/14/2021).    Nicki Clay MD  [facility]

## 2020-01-14 ENCOUNTER — OFFICE VISIT (OUTPATIENT)
Dept: FAMILY MEDICINE | Facility: OTHER | Age: 35
End: 2020-01-14
Payer: COMMERCIAL

## 2020-01-14 VITALS
SYSTOLIC BLOOD PRESSURE: 102 MMHG | DIASTOLIC BLOOD PRESSURE: 72 MMHG | TEMPERATURE: 98.1 F | HEIGHT: 65 IN | BODY MASS INDEX: 24.32 KG/M2 | OXYGEN SATURATION: 98 % | WEIGHT: 146 LBS | RESPIRATION RATE: 12 BRPM | HEART RATE: 90 BPM

## 2020-01-14 DIAGNOSIS — N32.81 OAB (OVERACTIVE BLADDER): ICD-10-CM

## 2020-01-14 DIAGNOSIS — J30.1 CHRONIC SEASONAL ALLERGIC RHINITIS DUE TO POLLEN: ICD-10-CM

## 2020-01-14 DIAGNOSIS — Z13.220 SCREENING FOR LIPOID DISORDERS: Primary | ICD-10-CM

## 2020-01-14 DIAGNOSIS — R35.0 URINARY FREQUENCY: ICD-10-CM

## 2020-01-14 DIAGNOSIS — Q05.8 SPINA BIFIDA OF SACRAL REGION WITHOUT HYDROCEPHALUS (H): ICD-10-CM

## 2020-01-14 DIAGNOSIS — R51.9 NONINTRACTABLE EPISODIC HEADACHE, UNSPECIFIED HEADACHE TYPE: ICD-10-CM

## 2020-01-14 DIAGNOSIS — M94.0 COSTOCHONDRITIS: ICD-10-CM

## 2020-01-14 PROCEDURE — 99395 PREV VISIT EST AGE 18-39: CPT | Performed by: FAMILY MEDICINE

## 2020-01-14 RX ORDER — SUMATRIPTAN 100 MG/1
100 TABLET, FILM COATED ORAL
Qty: 18 TABLET | Refills: 11 | Status: SHIPPED | OUTPATIENT
Start: 2020-01-14 | End: 2021-01-25

## 2020-01-14 RX ORDER — OXYBUTYNIN CHLORIDE 5 MG/1
10 TABLET, EXTENDED RELEASE ORAL DAILY
Qty: 180 TABLET | Refills: 3 | Status: SHIPPED | OUTPATIENT
Start: 2020-01-14 | End: 2021-01-25

## 2020-01-14 RX ORDER — MONTELUKAST SODIUM 10 MG/1
10 TABLET ORAL AT BEDTIME
Qty: 90 TABLET | Refills: 3 | Status: SHIPPED | OUTPATIENT
Start: 2020-01-14 | End: 2021-01-25

## 2020-01-14 ASSESSMENT — MIFFLIN-ST. JEOR: SCORE: 1374.38

## 2020-01-15 ASSESSMENT — ASTHMA QUESTIONNAIRES: ACT_TOTALSCORE: 25

## 2020-01-27 PROBLEM — M94.0 COSTOCHONDRITIS: Status: ACTIVE | Noted: 2020-01-27

## 2020-01-27 NOTE — ASSESSMENT & PLAN NOTE
Moderately well-controlled on Imitrex as needed.  Recheck labs.  Refill meds.  Follow-up in 6 months to a year.

## 2020-03-13 ENCOUNTER — VIRTUAL VISIT (OUTPATIENT)
Dept: FAMILY MEDICINE | Facility: OTHER | Age: 35
End: 2020-03-13

## 2020-03-14 NOTE — PROGRESS NOTES
"Date: 2020 14:58:56  Clinician: Gasper Prasad  Clinician NPI: [id number]  Patient: Shannon Olivarez  Patient : 1985  Patient Address: [address] [place] [address]  Patient Phone: [phone]  Visit Protocol: URI  Patient Summary:  Shannon is a 34 year old ( : 1985 ) female who initiated a Visit for COVID-19 (Coronavirus) evaluation and screening. When asked the question \"Please sign me up to receive news, health information and promotions. \", Shannon responded \"No\".    Shannon states her symptoms started 1-2 days ago.   Her symptoms consist of a sore throat, a cough, malaise, a headache, and myalgia. She is experiencing mild difficulty breathing with activities but can speak normally in full sentences. Shannon also feels feverish.   Symptom details     Cough: Shannon coughs a few times an hour and her cough is not more bothersome at night. Phlegm does not come into her throat when she coughs. She believes her cough is caused by post-nasal drip.     Sore throat: Shannon reports having mild throat pain (1-3 on a 10 point pain scale), does not have exudate on her tonsils, and can swallow liquids. She is not sure if the lymph nodes in her neck are enlarged. A rash has not appeared on the skin since the sore throat started.     Temperature: Her current temperature is 99.2 degrees Fahrenheit.     Headache: She states the headache is mild (1-3 on a 10 point pain scale).      Shannon denies having chills, wheezing, nasal congestion, teeth pain, ear pain, rhinitis, and facial pain or pressure. She also denies taking antibiotic medication for the symptoms, having recent facial or sinus surgery in the past 60 days, and having a sinus infection within the past year.   Precipitating events  Within the past week, Shannon has not been exposed to someone with strep throat. She has recently been exposed to someone with influenza. Shannon has not been in close contact with any high risk individuals.   " Pertinent COVID-19 (Coronavirus) information  Shannon has not traveled internationally or to the areas where COVID-19 (Coronavirus) is widespread in the last 14 days before the start of her symptoms.   Shannon has not had close contact with a laboratory-confirmed positive COVID-19 patient or someone under quarantine for suspected COVID-19 within 14 days of symptom onset.   Shannon is not a healthcare worker or does not work in a healthcare facility.   Pertinent medical history  Shannon does not get yeast infections when she takes antibiotics.   Shannon needs a return to work/school note.   Weight: 140 lbs   Shannon does not smoke or use smokeless tobacco.   She is not sure if she is pregnant and denies breastfeeding. She is currently menstruating.   Additional information as reported by the patient (free text): I have a mild case of asthma. I had a fever of 101.8 yesterday. Coughing and shortness of breath started today. Sore throat and nausea started tuesday. Sore throat went away and then came back last night. Have been having migraines on and off.  had some kind of flu with nausea, headaches, fatigue, and sore throat 2 weeks ago. Several coworkers traveled out of state and out of country recently and had just returned. They are not showing any symptoms. I work at a vet clinic.   Weight: 140 lbs    MEDICATIONS: ibuprofen oral, sumatriptan oral, Fiber-Caps (psyllium husk) oral, One Daily Women's oral, Prenatal Multivitamins oral, triamcinolone acetonide nasal, montelukast oral, oxybutynin chloride oral, ALLERGIES: cefaclor, amoxicillin, erythromycin base, cefixime  Clinician Response:  Dear Shannon,  Based on the information provided, you have an influenza-like illness. This is an infection that has the same symptoms of the flu, but the specific virus is not known. Lab testing would be required to confirm the flu virus, but this is often not necessary because the treatment will be the same no matter what is  causing your symptoms.  Your symptoms should improve gradually over the next week.  Medication information  I am prescribing:     Oseltamivir (Tamiflu) 75 mg oral capsule. Take 1 capsule by mouth 2 times per day for 5 days. There are no refills with this prescription.   Self care  The following tips will keep you as comfortable as possible while you recover:     Rest    Drink plenty of water and other liquids    Take a hot shower to loosen congestion    Use throat lozenges    Gargle with warm salt water (1/4 teaspoon of salt per 8 ounce glass of water)    Suck on frozen items such as popsicles or ice cubes    Drink hot tea with lemon and honey    Take a spoonful of honey to reduce your cough     If you have a fever, stay home until your temperature has returned to normal for 24 hours and you feel well enough for daily activities. And of course, wash your hands often to prevent spreading the flu and other illnesses. However, the best way to prevent the flu is to get a flu shot before each flu season.  When to seek care  Please be seen in a clinic or urgent care if new symptoms develop, or symptoms become worse.  Call 911 or go to the emergency room if you feel that your throat is closing off, you suddenly develop a rash, you are unable to swallow fluids, you are drooling, or you are having difficulty breathing.  COVID-19 (Coronavirus) General Information  With the increase in the number of COVID-19 (Coronavirus) cases, we understand you may have some questions. Below is some helpful information on COVID-19 (Coronavirus).  How can I protect myself and others from the COVID-19 (Coronavirus)?  Because there is currently no vaccine to prevent infection, the best way to protect yourself is to avoid being exposed to this virus. Put distance between yourself and other people if COVID-19 (Coronavirus) is spreading in your community. The virus is thought to spread mainly from person-to-person.     Between people who are in  close contact with one another (within about 6 about) for prolonged period (10 minutes or longer).    Through respiratory droplets produced when an infected person coughs or sneezes.     The CDC recommends the following additional steps to protect yourself and others:     Wash your hands often with soap and water for at least 20 seconds, especially after blowing your nose, coughing, or sneezing; going to the bathroom; and before eating or preparing food.  Use an alcohol-based hand  that contains at least 60 percent alcohol if soap and water are not available.        Avoid touching your eyes, nose and mouth with unwashed hands.    Avoid close contact with people who are sick.    Stay home when you are sick.    Cover your cough or sneeze with a tissue, then throw the tissue in the trash.    Clean and disinfect frequently touched objects and surfaces.     You can help stop COVID-19 (Coronavirus) by knowing the signs and symptoms:     Fever    Cough    Shortness of breath     Contact your healthcare provider if   Develop symptoms   AND   Have been in close contact with a person known to have COVID-19 (Coronavirus) or live in or have recently traveled from an area with ongoing spread of COVID-19 (Coronavirus). Call ahead before you go to a doctor's office or emergency room. Tell them about your recent travel and your symptoms.   For the most up to date information, visit the CDC's website.  Steps to help prevent the spread of COVID-19 (Coronavirus) if you are sick  If you are sick with COVID-19 (Coronavirus) or suspect you are infected with the virus that causes COVID-19 (Coronavirus), follow the steps below to help prevent the disease from spreading&nbsp;to people in your home and community.     Stay home except to get medical care. Home isolation may be started in consultation with your healthcare clinician.    Separate yourself from other people and animals in your home.    Call ahead before visiting your  "doctor if you have a medical appointment.    Wear a facemask when you are around other people.    Cover your cough and sneezes.    Clean your hands often.    Avoid sharing personal household items.    Clean and disinfect frequently touched objects and surfaces everyday.    You will need to have someone drop off medications or household supplies (if needed) at your house without coming inside or in contact with you or others living in your house.    Monitor your symptoms and seek prompt medical care if your illness is worsening (e.g. Difficulty breathing).    Discontinue home isolation only in consultation with your healthcare provider.     For more detailed and up to date information on what to do if you are sick, visit this link: What to Do If You Are Sick With Coronavirus Disease 2019 (COVID-19).  Do I need to be tested for COVID-19 (Coronavirus)?     At this time, the limited number of tests available are controlled by the state and local health departments and are being reserved for more seriously ill patients, those with known exposure to confirmed patients, and those with recent travel (within 14 days) to countries with high rates of COVID-19 (Coronavirus).    Decisions on which patients receive testing will be based on the local spread of COVID-19 (Coronavirus) as well as the symptoms. Your healthcare provider will make the final decision on whether you should be tested.    In the meantime, if you have concerns that you may have been exposed, it is reasonable to practice \"social distancing.\"&nbsp; If you are ill with a cold or flu-like illness, please monitor your symptoms and reach out to your healthcare provider if your symptoms worsen.    For more up to date information, visit this link: COVID-19 (Coronavirus) Frequently Asked Questions and Answers.      Diagnosis: Influenza-like illness  Diagnosis ICD: J11.1  Additional Clinician Notes: Based on current guidelines, you are unlikely to have the coronavirus " (covid19) and are not recommended to have testing done. Your symptoms are likely caused by influenza. given you asthma, I would recommend the tamiflu prescribed.  Prescription: oseltamivir (Tamiflu) 75 mg oral capsule 10 capsule, 5 days supply. Take 1 capsule by mouth 2 times per day for 5 days. Refills: 0, Refill as needed: no, Allow substitutions: yes  Pharmacy: [facility] [place] - [phone] - [address] [place] [address]

## 2020-11-09 ENCOUNTER — IMMUNIZATION (OUTPATIENT)
Dept: FAMILY MEDICINE | Facility: OTHER | Age: 35
End: 2020-11-09
Payer: COMMERCIAL

## 2020-11-09 DIAGNOSIS — Z23 NEED FOR PROPHYLACTIC VACCINATION AND INOCULATION AGAINST INFLUENZA: Primary | ICD-10-CM

## 2020-11-09 PROCEDURE — 90686 IIV4 VACC NO PRSV 0.5 ML IM: CPT

## 2020-11-09 PROCEDURE — 90471 IMMUNIZATION ADMIN: CPT

## 2020-11-09 PROCEDURE — 99207 PR NO CHARGE NURSE ONLY: CPT

## 2020-11-16 ENCOUNTER — VIRTUAL VISIT (OUTPATIENT)
Dept: FAMILY MEDICINE | Facility: OTHER | Age: 35
End: 2020-11-16

## 2020-11-16 ENCOUNTER — AMBULATORY - HEALTHEAST (OUTPATIENT)
Dept: FAMILY MEDICINE | Facility: CLINIC | Age: 35
End: 2020-11-16

## 2020-11-16 DIAGNOSIS — Z20.822 SUSPECTED 2019 NOVEL CORONAVIRUS INFECTION: ICD-10-CM

## 2020-11-16 NOTE — PROGRESS NOTES
"Date: 2020 11:33:22  Clinician: Mer Martin  Clinician NPI: [id number]  Patient: Shannon Olivarez  Patient : 1985  Patient Address:  [address] [place] [address]  Patient Phone: [phone]  Visit Protocol: URI  Patient Summary:  Shannon is a 34 year old ( : 1985 ) female who initiated a [facility] Visit for COVID-19 (Coronavirus) evaluation and screening. When asked the question \"Please sign me up to receive news, health information and promotions. \", Shannon responded \"No\".    Shannon states her symptoms started 1-2 days ago.   Her symptoms consist of myalgia, malaise, a sore throat, a headache, enlarged lymph nodes, a cough, and nasal congestion.   Symptom details     Nasal secretions: The color of her mucus is white.    Cough: Shannon coughs a few times an hour and her cough is not more bothersome at night. Phlegm does not come into her throat when she coughs. She believes her cough is caused by post-nasal drip.     Sore throat: Shannon reports having mild throat pain (1-3 on a 10 point pain scale), does not have exudate on her tonsils, and can swallow liquids. The lymph nodes in her neck are enlarged. A rash has not appeared on the skin since the sore throat started.     Headache: She states the headache is mild (1-3 on a 10 point pain scale).      Shannon denies having vomiting, rhinitis, facial pain or pressure, chills, teeth pain, ageusia, diarrhea, ear pain, wheezing, fever, nausea, and anosmia. She also denies taking antibiotic medication in the past month and having recent facial or sinus surgery in the past 60 days.   Precipitating events  Within the past week, Shannon has not been exposed to someone with strep throat. She has not recently been exposed to someone with influenza. Shannon has been in close contact with the following high risk individuals: immunocompromised people, adults 65 or older, and people with asthma, heart disease or diabetes.   Pertinent COVID-19 " (Coronavirus) information  Shannon does not work or volunteer as healthcare worker or a . In the past 14 days, Shannon has not worked or volunteered at a healthcare facility or group living setting.   In the past 14 days, she also has not lived in a congregate living setting.   Shannon has not had a close contact with a laboratory-confirmed COVID-19 patient within 14 days of symptom onset.    Since December 2019, Shannon has not been tested for COVID-19 and has had upper respiratory infection (URI) or influenza-like illness.      Date(s) of previous URI or influenza-like illness (free-text): March 13th - 17th 2020     Symptoms Shannon experienced during previous URI or influenza-like illness as reported by the patient (free-text): Fever, cough, nausea, and fatigue        Triage Point(s) temporarily suspended for COVID-19 (Coronavirus) screening  Shannon reported the following symptoms which were previously protocol referral points. These protocol referral points have temporarily been removed for purposes of COVID-19 (Coronavirus) screening.   Difficulty breathing even when resting and can only speak in phrase(s)   Pertinent medical history  Shannon does not get yeast infections when she takes antibiotics.   Shannon needs a return to work/school note.   Weight: 144 lbs   Shannon does not smoke or use smokeless tobacco.   She denies pregnancy and denies breastfeeding. She has menstruated in the past month.   Additional information as reported by the patient (free text): Swelling on left side of throat that is tender when I swallow   Weight: 144 lbs    MEDICATIONS: Fiber-Caps (psyllium husk) oral, oxybutynin chloride oral, montelukast oral, triamcinolone acetonide nasal, sumatriptan oral, Prenatal Multivitamins oral, ibuprofen oral, One Daily Women's oral, ALLERGIES: cefaclor, cefixime, erythromycin base, amoxicillin  Clinician Response:  Dear Shannon,   Your symptoms show that you may have coronavirus  "(COVID-19). This illness can cause fever, cough and trouble breathing. Many people get a mild case and get better on their own. Some people can get very sick.  What should I do?  We would like to test you for this virus.   1. Please call [phone] to schedule your visit. Explain that you were referred by [facility] to have a COVID-19 test. Be ready to share your [facility] visit ID number.  * If you need to schedule in [facility] please call [phone] or for [place] employees please call [phone].  * If you need to schedule in the [place] area please call [phone]. [place] employees call [phone].  The following will serve as your written order for this COVID Test, ordered by me, for the indication of suspected COVID [Z20.828]: The test will be ordered in [facility], our electronic health record, after you are scheduled. It will show as ordered and authorized by Gasper Prasad MD.  Order: COVID-19 (Coronavirus) PCR for SYMPTOMATIC testing from [facility].   2. When it's time for your COVID test:  Stay at least 6 feet away from others. (If someone will drive you to your test, stay in the backseat, as far away from the  as you can.)   Cover your mouth and nose with a mask, tissue or washcloth.  Go straight to the testing site. Don't make any stops on the way there or back.      3.Starting now: Stay home and away from others (self-isolate) until:   You've had no fever---and no medicine that reduces fever---for one full day (24 hours). And...   Your other symptoms have gotten better. For example, your cough or breathing has improved. And...   At least 10 days have passed since your symptoms started.       During this time, don't leave the house except for testing or medical care.   Stay in your own room, even for meals. Use your own bathroom if you can.   Stay away from others in your home. No hugging, kissing or shaking hands. No visitors.  Don't go to work, school or anywhere else.    Clean \"high touch\" surfaces " often (doorknobs, counters, handles, etc.). Use a household cleaning spray or wipes. You'll find a full list of  on the EPA website: www.epa.gov/pesticide-registration/list-n-disinfectants-use-against-sars-cov-2.   Cover your mouth and nose with a mask, tissue or washcloth to avoid spreading germs.  Wash your hands and face often. Use soap and water.  Caregivers in these groups are at risk for severe illness due to COVID-19:  o People 65 years and older  o People who live in a nursing home or long-term care facility  o People with chronic disease (lung, heart, cancer, diabetes, kidney, liver, immunologic)  o People who have a weakened immune system, including those who:   Are in cancer treatment  Take medicine that weakens the immune system, such as corticosteroids  Had a bone marrow or organ transplant  Have an immune deficiency  Have poorly controlled HIV or AIDS  Are obese (body mass index of 40 or higher)  Smoke regularly   o Caregivers should wear gloves while washing dishes, handling laundry and cleaning bedrooms and bathrooms.  o Use caution when washing and drying laundry: Don't shake dirty laundry, and use the warmest water setting that you can.  o For more tips, go to www.cdc.gov/coronavirus/2019-ncov/downloads/10Things.pdf.    4.Sign up for [employer]. We know it's scary to hear that you might have COVID-19. We want to track your symptoms to make sure you're okay over the next 2 weeks. Please look for an email from [employer]---this is a free, online program that we'll use to keep in touch. To sign up, follow the link in the email. Learn more at [email or web address]  How can I take care of myself?   Get lots of rest. Drink extra fluids (unless a doctor has told you not to).   Take Tylenol (acetaminophen) for fever or pain. If you have liver or kidney problems, ask your family doctor if it's okay to take Tylenol.   Adults can take either:    650 mg (two 325 mg pills) every 4 to 6  hours, or...   1,000 mg (two 500 mg pills) every 8 hours as needed.    Note: Don't take more than 3,000 mg in one day. Acetaminophen is found in many medicines (both prescribed and over-the-counter medicines). Read all labels to be sure you don't take too much.   For children, check the Tylenol bottle for the right dose. The dose is based on the child's age or weight.    If you have other health problems (like cancer, heart failure, an organ transplant or severe kidney disease): Call your specialty clinic if you don't feel better in the next 2 days.       Know when to call 911. Emergency warning signs include:    Trouble breathing or shortness of breath Pain or pressure in the chest that doesn't go away Feeling confused like you haven't felt before, or not being able to wake up Bluish-colored lips or face.  Where can I get more information?   [facility] -- About COVID-19: [email or web address]   CDC -- What to Do If You're Sick: www.cdc.gov/coronavirus/2019-ncov/about/steps-when-sick.html   CDC -- Ending Home Isolation: www.cdc.gov/coronavirus/2019-ncov/hcp/disposition-in-home-patients.html   CDC -- Caring for Someone: www.cdc.gov/coronavirus/2019-ncov/if-you-are-sick/care-for-someone.html   [facility] -- Interim Guidance for Hospital Discharge to Home: www.health.[facility].[place]/diseases/coronavirus/hcp/hospdischarge.pdf   [facility] clinical trials (COVID-19 research studies): [email or web address]/umn-clinical-trials    Below are the COVID-19 hotlines at the [place] Department of Health ([facility]). Interpreters are available.    For health questions: Call [phone] or 1-968.342.3478 (7 a.m. to 7 p.m.) For questions about schools and childcare: Call [phone] or 1-329.419.3388 (7 a.m. to 7 p.m.)    Diagnosis: Cough  Diagnosis ICD: R05

## 2020-11-17 ENCOUNTER — AMBULATORY - HEALTHEAST (OUTPATIENT)
Dept: FAMILY MEDICINE | Facility: CLINIC | Age: 35
End: 2020-11-17

## 2020-11-17 DIAGNOSIS — Z20.822 SUSPECTED 2019 NOVEL CORONAVIRUS INFECTION: ICD-10-CM

## 2020-11-19 ENCOUNTER — COMMUNICATION - HEALTHEAST (OUTPATIENT)
Dept: SCHEDULING | Facility: CLINIC | Age: 35
End: 2020-11-19

## 2021-01-08 PROBLEM — T78.1XXA POLLEN-FOOD ALLERGY: Status: ACTIVE | Noted: 2021-01-08

## 2021-01-22 NOTE — PROGRESS NOTES
SUBJECTIVE:   CC: Shannon Olivarez is an 35 year old woman who presents for preventive health visit.     Patient has been advised of split billing requirements and indicates understanding: Yes  Healthy Habits:     Getting at least 3 servings of Calcium per day:  Yes    Bi-annual eye exam:  Yes    Dental care twice a year:  NO    Sleep apnea or symptoms of sleep apnea:  None    Diet:  Regular (no restrictions)    Frequency of exercise:  1 day/week    Duration of exercise:  Less than 15 minutes    Taking medications regularly:  Yes    Medication side effects:  None    PHQ-2 Total Score: 0    Additional concerns today:  Yes  noted to have swelling of the throat on the left side for the past few months and wishes to have this evaluated.     -------------------------------------    Today's PHQ-2 Score:   PHQ-2 ( 1999 Pfizer) 1/25/2021   Q1: Little interest or pleasure in doing things 0   Q2: Feeling down, depressed or hopeless 0   PHQ-2 Score 0   Q1: Little interest or pleasure in doing things Not at all   Q2: Feeling down, depressed or hopeless Not at all   PHQ-2 Score 0       Abuse: Current or Past (Physical, Sexual or Emotional) - No  Do you feel safe in your environment? Yes    Social History     Tobacco Use     Smoking status: Never Smoker     Smokeless tobacco: Never Used   Substance Use Topics     Alcohol use: Yes     Alcohol Use 1/25/2021   Prescreen: >3 drinks/day or >7 drinks/week? No   Prescreen: >3 drinks/day or >7 drinks/week? -       Any new diagnosis of family breast, ovarian, or bowel cancer? No     Reviewed orders with patient.  Reviewed health maintenance and updated orders accordingly - Yes  Lab work is in process  Labs reviewed in EPIC  BP Readings from Last 3 Encounters:   01/25/21 100/62   01/14/20 102/72   09/24/19 120/70    Wt Readings from Last 3 Encounters:   01/25/21 65.3 kg (144 lb)   01/14/20 66.2 kg (146 lb)   09/24/19 64.9 kg (143 lb)                  Patient Active Problem List    Diagnosis     Spina bifida of sacral region without hydrocephalus (H)     OAB (overactive bladder)     Chronic maxillary sinusitis     Nonintractable episodic headache     Congenital dysplasia of both hips     Costochondritis     Pollen-food allergy     Past Surgical History:   Procedure Laterality Date     NO HISTORY OF SURGERY         Social History     Tobacco Use     Smoking status: Never Smoker     Smokeless tobacco: Never Used   Substance Use Topics     Alcohol use: Yes     Family History   Problem Relation Age of Onset     Allergies Mother      Asthma Mother      Allergies Father      Prostate Cancer Father      Cancer Maternal Grandfather         skin     Heart Disease Maternal Grandfather      Cardiovascular Maternal Grandfather      Lipids Maternal Grandfather      Other Cancer Maternal Grandfather         Lymphoma     Unknown/Adopted Paternal Grandmother      Unknown/Adopted Paternal Grandfather      Asthma Brother      Allergies Brother      Hypertension Maternal Grandmother      Cerebrovascular Disease Maternal Grandmother      Breast Cancer Other         Paternal Aunt     Breast Cancer Other         Paternal Aunt     Mental Illness Other         Maternal Uncle - Schizophrenia     Asthma Brother          Current Outpatient Medications   Medication Sig Dispense Refill     beclomethasone HFA (QVAR REDIHALER) 80 MCG/ACT inhaler Inhale 1 puff into the lungs 2 times daily 10.6 g 11     FIBER PO Take by mouth daily as needed       ibuprofen (ADVIL,MOTRIN) 200 MG tablet Take 2 tablets by mouth 2 times daily as needed.       montelukast (SINGULAIR) 10 MG tablet Take 1 tablet (10 mg) by mouth At Bedtime 90 tablet 3     Multiple Vitamin (MULTI-VITAMIN) per tablet Take 1 tablet by mouth daily.       oxybutynin ER (DITROPAN-XL) 5 MG 24 hr tablet Take 2 tablets (10 mg) by mouth daily 180 tablet 3     SUMAtriptan (IMITREX) 100 MG tablet Take 1 tablet (100 mg) by mouth at onset of headache for migraine Can repeat a  dose in 2 hrs if no relief. No more than 200mg in 24 hrs 18 tablet 11     triamcinolone (NASACORT ALLERGY 24HR) 55 MCG/ACT nasal inhaler Spray 1 spray into both nostrils daily       Allergies   Allergen Reactions     Ceclor [Cefaclor] Swelling     throat     Erythromycin      Can't remember the reaction      Amoxicillin Rash     Suprax [Cefixime] Rash     Recent Labs   Lab Test 01/25/21  1208 03/12/19  1025 09/29/16  0837   * 93 132*   HDL 89 96 80   TRIG 61 90 113   ALT  --  24 18   CR  --  0.79 0.66   GFRESTIMATED  --  >90 >90  Non African American GFR Calc     GFRESTBLACK  --  >90 >90  African American GFR Calc     POTASSIUM  --  4.2 4.1        Breast CA Risk Screening:  No flowsheet data found.    Not appropriate for mammogram  Pertinent mammograms are reviewed under the imaging tab.    History of abnormal Pap smear: NO - age 30- 65 PAP every 3 years recommended  PAP / HPV Latest Ref Rng & Units 9/28/2017 12/15/2014 9/2/2011   PAP - NIL NIL NIL   HPV 16 DNA NEG:Negative Negative - -   HPV 18 DNA NEG:Negative Negative - -   OTHER HR HPV NEG:Negative Negative - -     Reviewed and updated as needed this visit by clinical staff  Tobacco  Allergies  Meds  Problems  Med Hx  Surg Hx  Fam Hx  Soc Hx          Reviewed and updated as needed this visit by Provider    Meds  Problems              Past Medical History:   Diagnosis Date     Acne clear    sees derm Dr. Raygoza     Allergic rhinitis, cause unspecified     dr. joel GIBSON (allergic rhinitis)      High cholesterol      Hypertension 10/9/14    High blood pressure again noted at Urgent Care 12/13/14     Melanoma of scalp or neck (H)      Motion sickness 4/30/2013     Spina bifida (H)     seen on xray when in college      Past Surgical History:   Procedure Laterality Date     NO HISTORY OF SURGERY         Review of Systems   Constitutional: Negative for chills and fever.   HENT: Negative for congestion, ear pain, hearing loss and sore throat.   "  Eyes: Negative for pain and visual disturbance.   Respiratory: Negative for cough and shortness of breath.    Cardiovascular: Negative for chest pain, palpitations and peripheral edema.   Gastrointestinal: Negative for abdominal pain, constipation, diarrhea, heartburn, hematochezia and nausea.   Breasts:  Negative for tenderness, breast mass and discharge.   Genitourinary: Negative for dysuria, frequency, genital sores, hematuria, pelvic pain, urgency, vaginal bleeding and vaginal discharge.   Musculoskeletal: Negative for arthralgias, joint swelling and myalgias.   Skin: Negative for rash.   Neurological: Positive for headaches. Negative for dizziness, weakness and paresthesias.   Psychiatric/Behavioral: Negative for mood changes. The patient is not nervous/anxious.         OBJECTIVE:   /62   Pulse 81   Temp 97.2  F (36.2  C) (Temporal)   Ht 1.654 m (5' 5.12\")   Wt 65.3 kg (144 lb)   LMP 01/20/2021   SpO2 100%   BMI 23.88 kg/m    Physical Exam  GENERAL: healthy, alert and no distress  EYES: Eyes grossly normal to inspection, PERRL and conjunctivae and sclerae normal  HENT: ear canals and TM's normal, nose and mouth without ulcers or lesions  NECK: no adenopathy, no asymmetry, masses, or scars and thyroid normal to palpation  RESP: lungs clear to auscultation - no rales, rhonchi or wheezes  BREAST: normal without masses, tenderness or nipple discharge and no palpable axillary masses or adenopathy  CV: regular rate and rhythm, normal S1 S2, no S3 or S4, no murmur, click or rub, no peripheral edema and peripheral pulses strong  ABDOMEN: soft, nontender, no hepatosplenomegaly, no masses and bowel sounds normal  MS: no gross musculoskeletal defects noted, no edema  SKIN: no suspicious lesions or rashes  NEURO: Normal strength and tone, mentation intact and speech normal  PSYCH: mentation appears normal, affect normal/bright    Diagnostic Test Results:  Labs reviewed in Epic    ASSESSMENT/PLAN:   1. " "Routine general medical examination at a health care facility  -  2. Need for prophylactic vaccination and inoculation against influenza  - INFLUENZA VACCINE IM > 6 MONTHS VALENT IIV4 [09732]    3. Screening for HIV (human immunodeficiency virus)  - HIV Antigen Antibody Combo    4. Need for hepatitis C screening test  - Hepatitis C Screen Reflex to HCV RNA Quant and Genotype    5. Chronic seasonal allergic rhinitis due to pollen  -  Symptoms of sore throat could be secondary to post nasal drip. Advised to add claritin to the current regimen for better control.   - montelukast (SINGULAIR) 10 MG tablet; Take 1 tablet (10 mg) by mouth At Bedtime  Dispense: 90 tablet; Refill: 3  - beclomethasone HFA (QVAR REDIHALER) 80 MCG/ACT inhaler; Inhale 1 puff into the lungs 2 times daily  Dispense: 10.6 g; Refill: 11    7. Urinary frequency  stable  - oxybutynin ER (DITROPAN-XL) 5 MG 24 hr tablet; Take 2 tablets (10 mg) by mouth daily  Dispense: 180 tablet; Refill: 3    8. Nonintractable episodic headache, unspecified headache type  Stable on the current dose of imitrex.   Refill meds  - SUMAtriptan (IMITREX) 100 MG tablet; Take 1 tablet (100 mg) by mouth at onset of headache for migraine Can repeat a dose in 2 hrs if no relief. No more than 200mg in 24 hrs  Dispense: 18 tablet; Refill: 11    9. Screening for lipoid disorders  - Lipid panel reflex to direct LDL Fasting    10. Screening for diabetes mellitus  - Glucose    Patient has been advised of split billing requirements and indicates understanding: Yes  COUNSELING:  Reviewed preventive health counseling, as reflected in patient instructions       Regular exercise       Healthy diet/nutrition    Estimated body mass index is 23.88 kg/m  as calculated from the following:    Height as of this encounter: 1.654 m (5' 5.12\").    Weight as of this encounter: 65.3 kg (144 lb).        She reports that she has never smoked. She has never used smokeless tobacco.      Counseling " Resources:  ATP IV Guidelines  Pooled Cohorts Equation Calculator  Breast Cancer Risk Calculator  BRCA-Related Cancer Risk Assessment: FHS-7 Tool  FRAX Risk Assessment  ICSI Preventive Guidelines  Dietary Guidelines for Americans, 2010  USDA's MyPlate  ASA Prophylaxis  Lung CA Screening    Nicki Clay MD  [facility]

## 2021-01-25 ENCOUNTER — OFFICE VISIT (OUTPATIENT)
Dept: FAMILY MEDICINE | Facility: OTHER | Age: 36
End: 2021-01-25
Payer: COMMERCIAL

## 2021-01-25 VITALS
BODY MASS INDEX: 23.99 KG/M2 | HEART RATE: 81 BPM | SYSTOLIC BLOOD PRESSURE: 100 MMHG | TEMPERATURE: 97.2 F | OXYGEN SATURATION: 100 % | DIASTOLIC BLOOD PRESSURE: 62 MMHG | HEIGHT: 65 IN | WEIGHT: 144 LBS

## 2021-01-25 DIAGNOSIS — J30.1 CHRONIC SEASONAL ALLERGIC RHINITIS DUE TO POLLEN: ICD-10-CM

## 2021-01-25 DIAGNOSIS — Z13.1 SCREENING FOR DIABETES MELLITUS: ICD-10-CM

## 2021-01-25 DIAGNOSIS — Z23 NEED FOR PROPHYLACTIC VACCINATION AND INOCULATION AGAINST INFLUENZA: ICD-10-CM

## 2021-01-25 DIAGNOSIS — Z11.4 SCREENING FOR HIV (HUMAN IMMUNODEFICIENCY VIRUS): ICD-10-CM

## 2021-01-25 DIAGNOSIS — R51.9 NONINTRACTABLE EPISODIC HEADACHE, UNSPECIFIED HEADACHE TYPE: ICD-10-CM

## 2021-01-25 DIAGNOSIS — R35.0 URINARY FREQUENCY: ICD-10-CM

## 2021-01-25 DIAGNOSIS — J32.0 CHRONIC MAXILLARY SINUSITIS: ICD-10-CM

## 2021-01-25 DIAGNOSIS — Z13.220 SCREENING FOR LIPOID DISORDERS: ICD-10-CM

## 2021-01-25 DIAGNOSIS — Z11.59 NEED FOR HEPATITIS C SCREENING TEST: ICD-10-CM

## 2021-01-25 DIAGNOSIS — Z00.00 ROUTINE GENERAL MEDICAL EXAMINATION AT A HEALTH CARE FACILITY: Primary | ICD-10-CM

## 2021-01-25 LAB
CHOLEST SERPL-MCNC: 216 MG/DL
GLUCOSE SERPL-MCNC: 84 MG/DL (ref 70–99)
HDLC SERPL-MCNC: 89 MG/DL
LDLC SERPL CALC-MCNC: 115 MG/DL
NONHDLC SERPL-MCNC: 127 MG/DL
TRIGL SERPL-MCNC: 61 MG/DL

## 2021-01-25 PROCEDURE — 82947 ASSAY GLUCOSE BLOOD QUANT: CPT | Performed by: FAMILY MEDICINE

## 2021-01-25 PROCEDURE — 99395 PREV VISIT EST AGE 18-39: CPT | Performed by: FAMILY MEDICINE

## 2021-01-25 PROCEDURE — 86803 HEPATITIS C AB TEST: CPT | Performed by: FAMILY MEDICINE

## 2021-01-25 PROCEDURE — 36415 COLL VENOUS BLD VENIPUNCTURE: CPT | Performed by: FAMILY MEDICINE

## 2021-01-25 PROCEDURE — 80061 LIPID PANEL: CPT | Performed by: FAMILY MEDICINE

## 2021-01-25 PROCEDURE — 87389 HIV-1 AG W/HIV-1&-2 AB AG IA: CPT | Performed by: FAMILY MEDICINE

## 2021-01-25 RX ORDER — SUMATRIPTAN 100 MG/1
100 TABLET, FILM COATED ORAL
Qty: 18 TABLET | Refills: 11 | Status: SHIPPED | OUTPATIENT
Start: 2021-01-25 | End: 2021-04-27

## 2021-01-25 RX ORDER — MONTELUKAST SODIUM 10 MG/1
10 TABLET ORAL AT BEDTIME
Qty: 90 TABLET | Refills: 3 | Status: SHIPPED | OUTPATIENT
Start: 2021-01-25 | End: 2021-11-30

## 2021-01-25 RX ORDER — OXYBUTYNIN CHLORIDE 5 MG/1
10 TABLET, EXTENDED RELEASE ORAL DAILY
Qty: 180 TABLET | Refills: 3 | Status: SHIPPED | OUTPATIENT
Start: 2021-01-25 | End: 2021-11-08

## 2021-01-25 ASSESSMENT — ENCOUNTER SYMPTOMS
CHILLS: 0
BREAST MASS: 0
ARTHRALGIAS: 0
NAUSEA: 0
HEADACHES: 1
DYSURIA: 0
COUGH: 0
HEARTBURN: 0
ABDOMINAL PAIN: 0
MYALGIAS: 0
NERVOUS/ANXIOUS: 0
JOINT SWELLING: 0
PARESTHESIAS: 0
DIZZINESS: 0
PALPITATIONS: 0
FREQUENCY: 0
HEMATURIA: 0
SORE THROAT: 0
WEAKNESS: 0
FEVER: 0
SHORTNESS OF BREATH: 0
CONSTIPATION: 0
HEMATOCHEZIA: 0
DIARRHEA: 0
EYE PAIN: 0

## 2021-01-25 ASSESSMENT — ASTHMA QUESTIONNAIRES
QUESTION_4 LAST FOUR WEEKS HOW OFTEN HAVE YOU USED YOUR RESCUE INHALER OR NEBULIZER MEDICATION (SUCH AS ALBUTEROL): NOT AT ALL
QUESTION_1 LAST FOUR WEEKS HOW MUCH OF THE TIME DID YOUR ASTHMA KEEP YOU FROM GETTING AS MUCH DONE AT WORK, SCHOOL OR AT HOME: NONE OF THE TIME
QUESTION_3 LAST FOUR WEEKS HOW OFTEN DID YOUR ASTHMA SYMPTOMS (WHEEZING, COUGHING, SHORTNESS OF BREATH, CHEST TIGHTNESS OR PAIN) WAKE YOU UP AT NIGHT OR EARLIER THAN USUAL IN THE MORNING: NOT AT ALL
QUESTION_5 LAST FOUR WEEKS HOW WOULD YOU RATE YOUR ASTHMA CONTROL: COMPLETELY CONTROLLED
ACT_TOTALSCORE: 24
QUESTION_2 LAST FOUR WEEKS HOW OFTEN HAVE YOU HAD SHORTNESS OF BREATH: ONCE OR TWICE A WEEK

## 2021-01-25 ASSESSMENT — MIFFLIN-ST. JEOR: SCORE: 1360.93

## 2021-01-26 LAB
HCV AB SERPL QL IA: NONREACTIVE
HIV 1+2 AB+HIV1 P24 AG SERPL QL IA: NONREACTIVE

## 2021-01-26 ASSESSMENT — ASTHMA QUESTIONNAIRES: ACT_TOTALSCORE: 24

## 2021-01-27 ENCOUNTER — TELEPHONE (OUTPATIENT)
Dept: FAMILY MEDICINE | Facility: OTHER | Age: 36
End: 2021-01-27

## 2021-01-27 NOTE — TELEPHONE ENCOUNTER
Left message for patient to call clinic. Please inform patient the following:    Negative hepatitis C and HIV test.  Mild worsening cholesterol levels.  Advise low-fat diet and regular exercise.  Normal blood glucose levels.

## 2021-02-11 NOTE — TELEPHONE ENCOUNTER
Left message for patient to call clinic.     Please relay the following message:    Negative hepatitis C and HIV test.  Mild worsening cholesterol levels.  Advise low-fat diet and regular exercise.  Normal blood glucose levels.

## 2021-03-06 ENCOUNTER — MYC MEDICAL ADVICE (OUTPATIENT)
Dept: FAMILY MEDICINE | Facility: OTHER | Age: 36
End: 2021-03-06

## 2021-03-10 ASSESSMENT — PATIENT HEALTH QUESTIONNAIRE - PHQ9
10. IF YOU CHECKED OFF ANY PROBLEMS, HOW DIFFICULT HAVE THESE PROBLEMS MADE IT FOR YOU TO DO YOUR WORK, TAKE CARE OF THINGS AT HOME, OR GET ALONG WITH OTHER PEOPLE: NOT DIFFICULT AT ALL
SUM OF ALL RESPONSES TO PHQ QUESTIONS 1-9: 1
SUM OF ALL RESPONSES TO PHQ QUESTIONS 1-9: 1

## 2021-03-11 ENCOUNTER — PRENATAL OFFICE VISIT (OUTPATIENT)
Dept: OBGYN | Facility: OTHER | Age: 36
End: 2021-03-11
Payer: COMMERCIAL

## 2021-03-11 VITALS
HEIGHT: 65 IN | SYSTOLIC BLOOD PRESSURE: 120 MMHG | OXYGEN SATURATION: 98 % | BODY MASS INDEX: 24.41 KG/M2 | HEART RATE: 100 BPM | DIASTOLIC BLOOD PRESSURE: 60 MMHG | TEMPERATURE: 98.3 F | WEIGHT: 146.5 LBS

## 2021-03-11 DIAGNOSIS — R51.9 NONINTRACTABLE EPISODIC HEADACHE, UNSPECIFIED HEADACHE TYPE: ICD-10-CM

## 2021-03-11 DIAGNOSIS — Z34.90 EARLY STAGE OF PREGNANCY: Primary | ICD-10-CM

## 2021-03-11 DIAGNOSIS — Z87.728 HISTORY OF NEURAL TUBE DEFECT: ICD-10-CM

## 2021-03-11 LAB
ABO + RH BLD: NORMAL
ABO + RH BLD: NORMAL
ALBUMIN UR-MCNC: NEGATIVE MG/DL
APPEARANCE UR: CLEAR
B-HCG SERPL-ACNC: 1457 IU/L (ref 0–5)
BILIRUB UR QL STRIP: NEGATIVE
COLOR UR AUTO: YELLOW
ERYTHROCYTE [DISTWIDTH] IN BLOOD BY AUTOMATED COUNT: 11.9 % (ref 10–15)
GLUCOSE UR STRIP-MCNC: NEGATIVE MG/DL
HBV SURFACE AG SERPL QL IA: NONREACTIVE
HCT VFR BLD AUTO: 40.6 % (ref 35–47)
HGB BLD-MCNC: 14.1 G/DL (ref 11.7–15.7)
HGB UR QL STRIP: NEGATIVE
HIV 1+2 AB+HIV1 P24 AG SERPL QL IA: NONREACTIVE
KETONES UR STRIP-MCNC: NEGATIVE MG/DL
LEUKOCYTE ESTERASE UR QL STRIP: NEGATIVE
MCH RBC QN AUTO: 31.1 PG (ref 26.5–33)
MCHC RBC AUTO-ENTMCNC: 34.7 G/DL (ref 31.5–36.5)
MCV RBC AUTO: 89 FL (ref 78–100)
NITRATE UR QL: NEGATIVE
PH UR STRIP: 6.5 PH (ref 5–7)
PLATELET # BLD AUTO: 247 10E9/L (ref 150–450)
RBC # BLD AUTO: 4.54 10E12/L (ref 3.8–5.2)
SOURCE: NORMAL
SP GR UR STRIP: 1.01 (ref 1–1.03)
SPECIMEN EXP DATE BLD: NORMAL
T PALLIDUM AB SER QL: NONREACTIVE
UROBILINOGEN UR STRIP-ACNC: 0.2 EU/DL (ref 0.2–1)
WBC # BLD AUTO: 5.5 10E9/L (ref 4–11)

## 2021-03-11 PROCEDURE — 84702 CHORIONIC GONADOTROPIN TEST: CPT | Performed by: OBSTETRICS & GYNECOLOGY

## 2021-03-11 PROCEDURE — 86900 BLOOD TYPING SEROLOGIC ABO: CPT | Performed by: OBSTETRICS & GYNECOLOGY

## 2021-03-11 PROCEDURE — 99000 SPECIMEN HANDLING OFFICE-LAB: CPT | Performed by: OBSTETRICS & GYNECOLOGY

## 2021-03-11 PROCEDURE — 85027 COMPLETE CBC AUTOMATED: CPT | Performed by: OBSTETRICS & GYNECOLOGY

## 2021-03-11 PROCEDURE — 81003 URINALYSIS AUTO W/O SCOPE: CPT | Performed by: OBSTETRICS & GYNECOLOGY

## 2021-03-11 PROCEDURE — 36415 COLL VENOUS BLD VENIPUNCTURE: CPT | Performed by: OBSTETRICS & GYNECOLOGY

## 2021-03-11 PROCEDURE — 87389 HIV-1 AG W/HIV-1&-2 AB AG IA: CPT | Performed by: OBSTETRICS & GYNECOLOGY

## 2021-03-11 PROCEDURE — 87086 URINE CULTURE/COLONY COUNT: CPT | Performed by: OBSTETRICS & GYNECOLOGY

## 2021-03-11 PROCEDURE — 99207 PR FIRST OB VISIT: CPT | Performed by: OBSTETRICS & GYNECOLOGY

## 2021-03-11 PROCEDURE — 87591 N.GONORRHOEAE DNA AMP PROB: CPT | Performed by: OBSTETRICS & GYNECOLOGY

## 2021-03-11 PROCEDURE — 86780 TREPONEMA PALLIDUM: CPT | Mod: 90 | Performed by: OBSTETRICS & GYNECOLOGY

## 2021-03-11 PROCEDURE — 87491 CHLMYD TRACH DNA AMP PROBE: CPT | Performed by: OBSTETRICS & GYNECOLOGY

## 2021-03-11 PROCEDURE — 86901 BLOOD TYPING SEROLOGIC RH(D): CPT | Performed by: OBSTETRICS & GYNECOLOGY

## 2021-03-11 PROCEDURE — 87340 HEPATITIS B SURFACE AG IA: CPT | Performed by: OBSTETRICS & GYNECOLOGY

## 2021-03-11 PROCEDURE — 86762 RUBELLA ANTIBODY: CPT | Performed by: OBSTETRICS & GYNECOLOGY

## 2021-03-11 RX ORDER — METOCLOPRAMIDE 10 MG/1
10 TABLET ORAL 3 TIMES DAILY PRN
Qty: 20 TABLET | Refills: 1 | Status: SHIPPED | OUTPATIENT
Start: 2021-03-11 | End: 2021-11-30

## 2021-03-11 RX ORDER — LANOLIN ALCOHOL/MO/W.PET/CERES
400 CREAM (GRAM) TOPICAL DAILY
Qty: 90 TABLET | Refills: 3 | Status: SHIPPED | OUTPATIENT
Start: 2021-03-11 | End: 2021-11-30

## 2021-03-11 RX ORDER — FOLIC ACID 1 MG/1
4 TABLET ORAL DAILY
Qty: 90 TABLET | Refills: 3 | Status: SHIPPED | OUTPATIENT
Start: 2021-03-11 | End: 2021-06-30

## 2021-03-11 ASSESSMENT — PATIENT HEALTH QUESTIONNAIRE - PHQ9: SUM OF ALL RESPONSES TO PHQ QUESTIONS 1-9: 1

## 2021-03-11 ASSESSMENT — MIFFLIN-ST. JEOR: SCORE: 1371.01

## 2021-03-11 NOTE — PROGRESS NOTES
New OB Visit    Shannon Olivarez is a 35 year old  at 4w3d by LMP who presents today for a new OB exam. This is a planned and a desired pregnancy. Since her LMP, has experienced  fatigue and urinary urgency, breast tenderness. History of urinary urgency but has increased over the past month.  The patient denies vaginal bleeding, cramping or abdominal pain, or vaginal discharge. She denies nausea, emesis, abdominal pain, loss of appetite, vaginal discharge, dysuria, pelvic pain, lightheadedness, urinary frequency, vaginal bleeding, hemorrhoids and constipation. Has baseline nausea, constipation, urinary urgency prior to pregnancy.     Pregnancy complicated by:  -Migraines  -Asthma  -Neural tube defect  -Hip Dysplasia   -Urinary urgency    Have you travelled during the pregnancy?No  Have your sexual partner(s) travelled during the pregnancy?No    Ob Hx: .    Past Medical History of Father of Baby: Fatty liver disease. Otherwise healthy.     Gyn Hx: Patient's last menstrual period was 2021.     Last pap was 2017 NIL HPV negative, Denies history of abnormal paps. Next pap due 2022   STI history: denies    Family history genetic disorders, cystic fibrosis, SMA, intellectual disability or autism- Personal history of spina bifida. No other genetic disorders.     Most Recent Immunizations   Administered Date(s) Administered     Flu, Unspecified 2015     HEPA 2006     HepB 1996     Hib (PRP-T) 1989     Historical DTP/aP 04/10/1992     Influenza (IIV3) PF 10/19/2014     Influenza Vaccine IM > 6 months Valent IIV4 2020     Influenza Vaccine, 6+MO IM (QUADRIVALENT W/PRESERVATIVES) 2015     MMR 1998     Mantoux Tuberculin Skin Test 1999     Meningococcal (Menomune ) 2006     OPV, trivalent, live 04/10/1992     Pneumococcal 23 valent 1999     TD (ADULT, 7+) 1999     TDAP Vaccine (Adacel) 2013     Tetanus 2004     Varicella  11/02/1995        PMH:   Past Medical History:   Diagnosis Date     Acne clear    sees derm Dr. Raygoza     Allergic rhinitis, cause unspecified     dr. joel GIBSON (allergic rhinitis)      Asthma 1997     High cholesterol      Hypertension 10/09/2014    High blood pressure again noted at Urgent Care 12/13/14     Melanoma of scalp or neck (H)      Motion sickness 04/30/2013     Spina bifida (H) 2006    seen on xray when in college       SurgHx:   Past Surgical History:   Procedure Laterality Date     NO HISTORY OF SURGERY         FamHx:   Family History   Problem Relation Age of Onset     Allergies Mother      Asthma Mother      Allergies Father      Prostate Cancer Father      Cancer Maternal Grandfather         skin     Heart Disease Maternal Grandfather      Cardiovascular Maternal Grandfather      Lipids Maternal Grandfather      Other Cancer Maternal Grandfather         Lymphoma     Unknown/Adopted Paternal Grandmother      Unknown/Adopted Paternal Grandfather      Asthma Brother      Allergies Brother      Hypertension Maternal Grandmother      Cerebrovascular Disease Maternal Grandmother      Breast Cancer Other         Paternal Aunt     Breast Cancer Other         Paternal Aunt     Mental Illness Other         Maternal Uncle - Schizophrenia     Asthma Brother        SocHx:   Social History     Socioeconomic History     Marital status:      Spouse name: Not on file     Number of children: Not on file     Years of education: Not on file     Highest education level: Not on file   Occupational History     Not on file   Social Needs     Financial resource strain: Not on file     Food insecurity     Worry: Not on file     Inability: Not on file     Transportation needs     Medical: Not on file     Non-medical: Not on file   Tobacco Use     Smoking status: Never Smoker     Smokeless tobacco: Never Used   Substance and Sexual Activity     Alcohol use: Yes     Drug use: No     Sexual activity: Yes      Partners: Male     Birth control/protection: Pill   Lifestyle     Physical activity     Days per week: Not on file     Minutes per session: Not on file     Stress: Not on file   Relationships     Social connections     Talks on phone: Not on file     Gets together: Not on file     Attends Congregational service: Not on file     Active member of club or organization: Not on file     Attends meetings of clubs or organizations: Not on file     Relationship status: Not on file     Intimate partner violence     Fear of current or ex partner: Not on file     Emotionally abused: Not on file     Physically abused: Not on file     Forced sexual activity: Not on file   Other Topics Concern     Parent/sibling w/ CABG, MI or angioplasty before 65F 55M? No   Social History Narrative     Not on file       Allergies:   Ceclor [cefaclor], Erythromycin, Amoxicillin, and Suprax [cefixime]    Medications:   beclomethasone HFA (QVAR REDIHALER) 80 MCG/ACT inhaler, Inhale 1 puff into the lungs 2 times daily  FIBER PO, Take by mouth daily as needed  GLUCOSAMINE HCL PO,   montelukast (SINGULAIR) 10 MG tablet, Take 1 tablet (10 mg) by mouth At Bedtime  Multiple Vitamin (MULTI-VITAMIN) per tablet, Take 1 tablet by mouth daily.  oxybutynin ER (DITROPAN-XL) 5 MG 24 hr tablet, Take 2 tablets (10 mg) by mouth daily  Prenatal Vit-Fe Fumarate-FA (PRENATAL VITAMIN PO),   triamcinolone (NASACORT ALLERGY 24HR) 55 MCG/ACT nasal inhaler, Spray 1 spray into both nostrils daily  ibuprofen (ADVIL,MOTRIN) 200 MG tablet, Take 2 tablets by mouth 2 times daily as needed.  SUMAtriptan (IMITREX) 100 MG tablet, Take 1 tablet (100 mg) by mouth at onset of headache for migraine Can repeat a dose in 2 hrs if no relief. No more than 200mg in 24 hrs (Patient not taking: Reported on 3/11/2021)  [DISCONTINUED] beclomethasone (QVAR) 80 MCG/ACT Inhaler, 2 puffs into nasal passages once daily with adapter (Patient not taking: Reported on 1/25/2021)    No current  "facility-administered medications on file prior to visit.       Past medical, surgical, social and family history were reviewed and updated in Epic.      ROS: As described in HPI, otherwise negative for fever/chills, dizziness, changes or new deficits in vision, worrisome rashes, new lumps or masses, cough/SOB/CP, GI distress, dysuria, abnormal vaginal discharge, constipation/diarrhea, neurological deficits, or other systemic complaints    EXAM:  Vitals: /60 (BP Location: Right arm, Patient Position: Chair, Cuff Size: Adult Regular)   Pulse 100   Temp 98.3  F (36.8  C) (Temporal)   Ht 1.652 m (5' 5.04\")   Wt 66.5 kg (146 lb 8 oz)   LMP 2021   SpO2 98%   BMI 24.35 kg/m    BMI= Body mass index is 24.35 kg/m .      Gen: Alert, oriented, appropriately interactive, NAD  Neck: soft, no cervical adenopathy, no masses  CV: RRR, no murmurs, no extra heart sounds  Resp: CTAB, good effort without distress   Breasts: soft, mildly tender, no abnormal lumps/masses.   Abdomen: soft, gravid, non tender, non distended, no masses, no hernias. No inguinal lymphadenopathy  External genitalia: no lesions; normal appearing external genitalia, bartholins glands, urethra, skenes glands  Vagina: no masses or lesions or discharge, normally rugated.  Cervix: no masses or lesions or discharge   Bimanual exam:   Urethra nontender   Bladder: nontender and without massess, well supported   Uterus: midline , anteverted, mobile , no masses, non-tender, gravid at <5weeks  Adnexa: no masses or tenderness   No cervical motion tenderness  Lower extremities: non-tender, no edema  Skin: no lesions or rashes    Labs & Imaging:  No lab results found.    ASSESSMENT/PLAN: Shannon Olivarez is a 35 year old  at 4w3d by LMP  who presents today for her first ob visit      ICD-10-CM    1. Early stage of pregnancy  Z34.90 ABO and Rh     CBC with platelets     Rubella Antibody IgG Quantitative     Treponema Abs w Reflex to RPR and Titer     " "Hepatitis B surface antigen     HIV Antigen Antibody Combo     Chlamydia trachomatis PCR     Neisseria gonorrhoeae PCR     Urine Culture Aerobic Bacterial     *UA reflex to Microscopic and Culture ([place] [facility] (except [place] and [place])     HCG Quantitative Pregnancy, Blood (GJP210)   2. History of neural tube defect  Z87.728 folic acid (FOLVITE) 400 MCG tablet     folic acid (FOLVITE) 1 MG tablet       Pregnancy c/b:  -Migraines: Previously taking Imitrex. Safe medications for management in pregnancy reviewed. Reglan rx sent, take with tylenol.  -Asthma: Allergy-induced, may use rescue inhaler 2x/wk. Continue to closely monitor.   -Neural tube defect: personal history, had MRI for nerve pain in leg, found mild case of spina bifida in lower 3 vertebrae in 2006.  Denies any other symptoms. Reviewed folic acid supplementation in pregnancy, AFP at 15-22wks. Level II ultrasound.  -Hip Dysplasia: discovered three years ago, reports \"clicking\" in hips but no other symptoms. Positioning during labor and delivery will need to be closely monitored, PT during pregnancy as needed.  -Urinary urgency, taking oxybutynin. Reviewed safety profile in pregnancy. Pelvic PT referral placed.    Routine Prenatal Care:  -Discussed genetic screening options, including sequential and quad testing/AFP, cell-free DNA as well as diagnostic testing including amniocentesis. Patient would like think about it.   -Discussed Cystic Fibrosis and SMA carrier screening, the patient think about it.   -Discussed ordered labs and ultrasound,   all questions answered.   -Discussed benefits of breastfeeding, would like to breastfeed.   -Folder given, outlining p hysician coverage, exercise, weight gain, schedule of visits, routine and indicated ultrasounds, prenatal vitamins and childbirth education.  Desires delivery at [facility] vs. [facility]  -Body mass index is 24.35 kg/m . - recommend 25-35 lbs (BMI 18.5-24.9).         Return in " 4 weeks for routine OB care    PRATIMA PalmerN, RN, DNP student, [facility] specialty, with the consent of the patient and in collaboration with Dr. Levy performed the initial HPI, ROS, physical exam and acted as scribe for the remainder of the visit.     Physician Attestation:  I was present with the student who participated in the service and in the documentation of the note. I have verified the history and personally performed the physical exam and medical decision making. I agree with the assessment and plan of care as documented in the note, and have edited to reflect my findings.        Kirti Levy,   3/11/2021 7:35 AM

## 2021-03-12 ENCOUNTER — TELEPHONE (OUTPATIENT)
Dept: OBGYN | Facility: CLINIC | Age: 36
End: 2021-03-12

## 2021-03-12 DIAGNOSIS — Z34.90 EARLY STAGE OF PREGNANCY: Primary | ICD-10-CM

## 2021-03-12 LAB
BACTERIA SPEC CULT: NO GROWTH
C TRACH DNA SPEC QL NAA+PROBE: NEGATIVE
Lab: NORMAL
N GONORRHOEA DNA SPEC QL NAA+PROBE: NEGATIVE
RUBV IGG SERPL IA-ACNC: 57 IU/ML
SPECIMEN SOURCE: NORMAL

## 2021-03-12 NOTE — TELEPHONE ENCOUNTER
Reason for Call: Request for an order or referral:    Order or referral being requested: pt looking to scheduled her first ultrasound but orders are not in yet     Date needed: within the next several days    Has the patient been seen by the PCP for this problem? NO    Additional comments: seen by Mildred yesterday.     Phone number Patient can be reached at:  Cell number on file:    Telephone Information:   Mobile [phone]       Best Time:  anytime    Can we leave a detailed message on this number?  YES    Call taken on 3/12/2021 at 12:44 PM by Jessa Morris

## 2021-03-12 NOTE — TELEPHONE ENCOUNTER
Left message for patient to call back. Please let her know US is ordered. Please transfer to scheduling.  Sigrid Doran, CMA

## 2021-03-15 DIAGNOSIS — Z34.90 EARLY STAGE OF PREGNANCY: ICD-10-CM

## 2021-03-15 LAB — B-HCG SERPL-ACNC: 7274 IU/L (ref 0–5)

## 2021-03-15 PROCEDURE — 84702 CHORIONIC GONADOTROPIN TEST: CPT | Performed by: OBSTETRICS & GYNECOLOGY

## 2021-03-15 PROCEDURE — 36415 COLL VENOUS BLD VENIPUNCTURE: CPT | Performed by: OBSTETRICS & GYNECOLOGY

## 2021-04-12 ENCOUNTER — HOSPITAL ENCOUNTER (OUTPATIENT)
Dept: ULTRASOUND IMAGING | Facility: CLINIC | Age: 36
Discharge: HOME | End: 2021-04-12
Attending: OBSTETRICS & GYNECOLOGY | Admitting: OBSTETRICS & GYNECOLOGY
Payer: COMMERCIAL

## 2021-04-12 ENCOUNTER — PRENATAL OFFICE VISIT (OUTPATIENT)
Dept: OBGYN | Facility: CLINIC | Age: 36
End: 2021-04-12
Payer: COMMERCIAL

## 2021-04-12 VITALS
TEMPERATURE: 99.1 F | WEIGHT: 146.5 LBS | DIASTOLIC BLOOD PRESSURE: 76 MMHG | SYSTOLIC BLOOD PRESSURE: 120 MMHG | BODY MASS INDEX: 24.35 KG/M2 | HEART RATE: 115 BPM

## 2021-04-12 DIAGNOSIS — Z34.90 EARLY STAGE OF PREGNANCY: ICD-10-CM

## 2021-04-12 DIAGNOSIS — Z87.728 HISTORY OF NEURAL TUBE DEFECT: ICD-10-CM

## 2021-04-12 DIAGNOSIS — Z34.91 NORMAL PREGNANCY IN FIRST TRIMESTER: Primary | ICD-10-CM

## 2021-04-12 PROCEDURE — 99207 PR PRENATAL VISIT: CPT | Performed by: OBSTETRICS & GYNECOLOGY

## 2021-04-12 PROCEDURE — 76801 OB US < 14 WKS SINGLE FETUS: CPT

## 2021-04-12 NOTE — PROGRESS NOTES
"Presents for routine  appointment.    Nausea/vomiting: Last emesis this morning, slowly improving. Last emesis 4 days ago.     No complaints.    No LOF/VB/Ctx. Mild pelvic cramping every few days.     ROS:   and GI negative. Stopped Oxybutin, did not feel it was helping. Wears pads for possible incontinence.     /76   Pulse 115   Temp 99.1  F (37.3  C) (Temporal)   Wt 66.5 kg (146 lb 8 oz)   LMP 2021   BMI 24.35 kg/m         A/P:  35 year old  at 9w0d NERY      ICD-10-CM    1. Normal pregnancy in first trimester  Z34.91    2. History of neural tube defect  Z87.728        Pregnancy c/b:  -Migraines: Reglan with tylenol  -Asthma: Allergy-induced, stable  -Neural tube defect: personal history, had MRI for nerve pain in leg, found mild case of spina bifida in lower 3 vertebrae in - records not available, patient unsure.  Denies any other symptoms. Reviewed folic acid supplementation in pregnancy, AFP at 15-22wks. Level II ultrasound. Plan for anesthesia consult 3rd trimester.  -Hip Dysplasia: discovered three years ago, reports \"clicking\" in hips but no other symptoms. Positioning during labor and delivery will need to be closely monitored, PT during pregnancy as needed.      Routine Prenatal Care:  -Viability ultrasound today, FHT 170s. Report pending.  -Genetic screening Quad at 15-22wks  -Discussed appropriate weight gain, diet, and exercise     Follow up in 4 weeks.    Fiona Orta, BSN, RN, DNP student, [facility] specialty, with the consent of the patient and in collaboration with Dr. Levy performed the initial HPI, ROS and acted as scribe for the remainder of the visit.     Physician Attestation:  I was present with the student who participated in the service and in the documentation of the note. I have verified the history and personally performed the physical exam and medical decision making. I agree with the assessment and plan of care as documented in the note, and have " edited to reflect my findings.    Kirti Levy, DO

## 2021-04-20 ENCOUNTER — NURSE TRIAGE (OUTPATIENT)
Dept: FAMILY MEDICINE | Facility: OTHER | Age: 36
End: 2021-04-20

## 2021-04-20 NOTE — TELEPHONE ENCOUNTER
"Patient alert and oriented on the phone, patient is not on ondansetron is taking unisom per previous contact with provider.   Nurse advised continue small sips of water, also see protocol advice. If questions call clinic or send my chart.         Additional Information    Negative: Sounds like a life-threatening emergency to the triager    Negative: Vaginal bleeding and pregnant < 20 weeks    Negative: Abdominal pain and pregnant < 20 weeks    Negative: Headache is the main complaint    Negative: Vomiting red blood or black (coffee ground) material    Negative: Insulin-dependent diabetes (Type I) and glucose > 400 mg/dL (22 mmol/L)    Negative: Recent head injury (within last 3 days)    Negative: Recent abdominal injury (within last 3 days)    Negative: Severe pain in one eye    Negative: SEVERE vomiting (e.g., > 10 times / day) and present > 8 hours    Negative: Unable to keep ANY liquids down (without vomiting) this past 24 hours    Negative: Weight loss > 5 pounds (2.5 kg) in last 2 weeks    Negative: Drinking very little and has signs of dehydration (e.g., no urine > 12 hours, very dry mouth)    Negative: Patient sounds very sick or weak to the triager    Negative: MODERATE vomiting (e.g., 5 - 10 times / day) and present > 3 days    Negative: Discomfort when passing urine (e.g., pain, burning or stinging)    Negative: Alcohol or drug abuse, known or suspected    Negative: High-risk adult (e.g., diabetes, AIDS/HIV)    Negative: Patient wants to be seen    Negative: MILD vomiting (e.g., 1-4 times / day) present > 1 week and no improvement after using Morning Sickness Care Advice    Nausea or vomiting    Answer Assessment - Initial Assessment Questions  1. VOMITING SEVERITY: \"How many times have you vomited  in the past 24 hours?\"      - MILD: 1 - 2 times/day     - MODERATE:  3 - 7 times/day     - SEVERE:  8 or more times/day, vomits everything or nearly everything, weight loss       3 times   2. ONSET: \"When did " "the vomiting begin?\"       Last night after dinner  3. FLUIDS: \"What fluids or food have you vomited up today?\" \"Are you able to keep any liquids down?\"      Last night vomited rice and carrots, this morning Pedialyte and water, later 2 trisquit crackers and Pedialyte and vomited that up. Just now drank a small amount of pedialyte and that is staying down so far   4. TREATMENT: \"What have you been doing so far to treat this?\"       ondansetron at night started last Monday takes a half tablet and vitamin B6 2 times daily. u  5. DEHYDRATION: \"When was the last time you urinated?\" \"Are you feeling lightheaded?\" \"Weight loss?\"      Urinated a little this morning.   6. PREGNANCY: \"How many weeks pregnant are you?\" \"How has the pregnancy been going?\"      About 9 weeks pregnant, other then nauseas a lot going well, last week was really nauseas and over weekend was doing okay.   7. STEPHEN: \"What date are you expecting to deliver?\"      11/15/2021  8. MEDICATIONS: \"What medications are you taking?\" (e.g., prenatal vitamins, iron)      Ondansetron   9. OTHER SYMPTOMS: \"Do you have any other symptoms?\"      Mild period like cramping in lower abdomen.    Protocols used: PREGNANCY - MORNING SICKNESS (NAUSEA AND VOMITING OF PREGNANCY)-A-OH      Florencia Olguin RN  [facility]                   "

## 2021-04-20 NOTE — TELEPHONE ENCOUNTER
Will route to patient's OB as I do not see OB patients    Javier Kc PA-C  [facility]

## 2021-04-21 NOTE — TELEPHONE ENCOUNTER
currently 10w 2d    Unable to reach patient via phone.  Left message to call clinic back at [phone].  PRATIMA AlmarazN RN

## 2021-04-22 ENCOUNTER — MYC MEDICAL ADVICE (OUTPATIENT)
Dept: OBGYN | Facility: CLINIC | Age: 36
End: 2021-04-22

## 2021-04-22 DIAGNOSIS — R11.0 NAUSEA: Primary | ICD-10-CM

## 2021-04-22 NOTE — TELEPHONE ENCOUNTER
Unable to reach patient via phone. RN left a message and instructed patient to call the clinic at [phone].    RN also sent mychart message to pt to triage as she has responded via [employer] before.    Beatrice Peguero RN on 4/22/2021 at 9:28 AM

## 2021-04-23 RX ORDER — ONDANSETRON 4 MG/1
4 TABLET, FILM COATED ORAL EVERY 8 HOURS PRN
Qty: 90 TABLET | Refills: 1 | Status: SHIPPED | OUTPATIENT
Start: 2021-04-23 | End: 2021-09-20

## 2021-04-23 NOTE — TELEPHONE ENCOUNTER
Prescription for Zofran sent in.  If she continues to have discharge, she should be seen for a wet prep.    Gina Portillo, DO

## 2021-04-23 NOTE — TELEPHONE ENCOUNTER
Pt currently 10w4d, last seen 4/12/2021.    Pt continuing to have nausea, pt states feeling better since she called in a few days ago. Pt states little to no relief with vitamin B6 and unisom. Pt denies any vaginal bleeding.    Pt states she is eating bland foods and keeping fluids down.     Pt's preferred pharmacy is [facility] in [place].    RN routing to provider for advisement on possible antiemetic.    Beatrice Peguero RN on 4/23/2021 at 12:58 PM

## 2021-04-23 NOTE — TELEPHONE ENCOUNTER
Pt has read [employer] message asking how she is doing, remedies for nausea, and when to contact clinic. Pt has not yet responded.    RN closing encounter.    Beatrice Peguero RN on 4/23/2021 at 8:10 AM

## 2021-04-27 ENCOUNTER — OFFICE VISIT (OUTPATIENT)
Dept: URGENT CARE | Facility: URGENT CARE | Age: 36
End: 2021-04-27
Payer: COMMERCIAL

## 2021-04-27 VITALS
HEART RATE: 102 BPM | SYSTOLIC BLOOD PRESSURE: 124 MMHG | WEIGHT: 142.4 LBS | TEMPERATURE: 99.4 F | BODY MASS INDEX: 23.67 KG/M2 | DIASTOLIC BLOOD PRESSURE: 82 MMHG | OXYGEN SATURATION: 100 %

## 2021-04-27 DIAGNOSIS — B37.31 YEAST INFECTION OF THE VAGINA: Primary | ICD-10-CM

## 2021-04-27 DIAGNOSIS — R35.0 URINARY FREQUENCY: ICD-10-CM

## 2021-04-27 LAB
ALBUMIN UR-MCNC: NEGATIVE MG/DL
APPEARANCE UR: ABNORMAL
BACTERIA #/AREA URNS HPF: ABNORMAL /HPF
BILIRUB UR QL STRIP: NEGATIVE
COLOR UR AUTO: YELLOW
GLUCOSE UR STRIP-MCNC: NEGATIVE MG/DL
HGB UR QL STRIP: NEGATIVE
KETONES UR STRIP-MCNC: 15 MG/DL
LEUKOCYTE ESTERASE UR QL STRIP: NEGATIVE
NITRATE UR QL: NEGATIVE
NON-SQ EPI CELLS #/AREA URNS LPF: ABNORMAL /LPF
PH UR STRIP: 5.5 PH (ref 5–7)
RBC #/AREA URNS AUTO: ABNORMAL /HPF
SOURCE: ABNORMAL
SP GR UR STRIP: >1.03 (ref 1–1.03)
SPECIMEN SOURCE: ABNORMAL
UROBILINOGEN UR STRIP-ACNC: 4 EU/DL (ref 0.2–1)
WBC #/AREA URNS AUTO: ABNORMAL /HPF
WET PREP SPEC: ABNORMAL

## 2021-04-27 PROCEDURE — 81001 URINALYSIS AUTO W/SCOPE: CPT | Performed by: NURSE PRACTITIONER

## 2021-04-27 PROCEDURE — 99214 OFFICE O/P EST MOD 30 MIN: CPT | Performed by: NURSE PRACTITIONER

## 2021-04-27 PROCEDURE — 87210 SMEAR WET MOUNT SALINE/INK: CPT | Performed by: NURSE PRACTITIONER

## 2021-04-27 RX ORDER — GRANULES FOR ORAL 3 G/1
3 POWDER ORAL ONCE
Qty: 1 PACKET | Refills: 0 | Status: CANCELLED | OUTPATIENT
Start: 2021-04-27 | End: 2021-04-27

## 2021-04-27 ASSESSMENT — ENCOUNTER SYMPTOMS
VOMITING: 1
CHILLS: 0
FLANK PAIN: 0
HEMATURIA: 0
NAUSEA: 1
FEVER: 0

## 2021-04-28 NOTE — PROGRESS NOTES
Assessment & Plan     Urinary frequency    - UA reflex to Microscopic and Culture  - Urine Microscopic  - Wet prep    Yeast infection of the vagina  Monistat 7-day OTC     11-week pregnant patient with multiple allergies to antibiotics that would be useful in pregnancy with UTI symptoms.  Symptoms were preceded by increase in vaginal discharge.    Urine dipstick was completely negative with some microscopic bacteria and 5-10 white WBCs seen.    Wet prep showed yeast infection.    Recommend OTC 7-day Monistat, checking with OB/GYN whether they want to treat bacteria in urine prior to culture coming back.  Patient has no red flag symptoms at this time such as flank pain, fevers.     Patient told she should go to the emergency room with fevers or side pain that is new.    25 minutes spent on the date of the encounter doing chart review, patient visit, documentation and Research on antibiotics in pregnancy due to allergies to commonly prescribed medications for UTIs in pregnancy         Return in about 3 days (around 4/30/2021) for If no better.    Tamar Desai [facility] [place]    Subjective     Adri is a 35 year old female who presents to clinic today for the following health issues:  Chief Complaint   Patient presents with     UTI     started this morning, frequency, painful urination, lower abdominal pain     HPI    This morning noted dysuria, worse at the end of her stream with lingering pain, lower abdomen pain, and urinary frequency.  Feels similar to previous UTI that she has had in college.    Had vaginal discharge earlier in the week which seems to have improved.     Is 11 weeks pregnant. IUP verified at 9 weeks.   Notable allergies to cephalosporins including throat swelling with Ceclor, erythromycin and amoxicillin.    Did void just PTA and when she arrived.  Has been sipping Pedialyte lately due to trouble with pregnancy N/V.      Did not have sexual intercourse within the  last 2 days prior to the onset of UTI symptoms symptoms.      Review of Systems   Constitutional: Negative for chills and fever.   Gastrointestinal: Positive for nausea (not worse than usual w pregnancy. ) and vomiting.   Genitourinary: Positive for vaginal discharge. Negative for flank pain and hematuria.         Objective    /82   Pulse 102   Temp 99.4  F (37.4  C) (Tympanic)   Wt 64.6 kg (142 lb 6.4 oz)   LMP 02/08/2021   SpO2 100%   BMI 23.67 kg/m    Physical Exam  Constitutional:       General: She is not in acute distress.     Appearance: She is well-developed.   Eyes:      General:         Right eye: No discharge.         Left eye: No discharge.      Conjunctiva/sclera: Conjunctivae normal.   Pulmonary:      Effort: Pulmonary effort is normal.   Musculoskeletal: Normal range of motion.   Skin:     General: Skin is warm and dry.      Capillary Refill: Capillary refill takes less than 2 seconds.   Neurological:      Mental Status: She is alert and oriented to person, place, and time.   Psychiatric:         Mood and Affect: Mood normal.         Behavior: Behavior normal.         Thought Content: Thought content normal.         Judgment: Judgment normal.            Results for orders placed or performed in visit on 04/27/21 (from the past 24 hour(s))   UA reflex to Microscopic and Culture    Specimen: Midstream Urine   Result Value Ref Range    Color Urine Yellow     Appearance Urine Slightly Cloudy     Glucose Urine Negative NEG^Negative mg/dL    Bilirubin Urine Negative NEG^Negative    Ketones Urine 15 (A) NEG^Negative mg/dL    Specific Gravity Urine >1.030 1.003 - 1.035    Blood Urine Negative NEG^Negative    pH Urine 5.5 5.0 - 7.0 pH    Protein Albumin Urine Negative NEG^Negative mg/dL    Urobilinogen Urine 4.0 (H) 0.2 - 1.0 EU/dL    Nitrite Urine Negative NEG^Negative    Leukocyte Esterase Urine Negative NEG^Negative    Source Midstream Urine    Urine Microscopic   Result Value Ref Range    WBC  Urine 5-10 (A) OTO5^0 - 5 /HPF    RBC Urine 2-5 (A) OTO2^O - 2 /HPF    Squamous Epithelial /LPF Urine Moderate (A) FEW^Few /LPF    Bacteria Urine Moderate (A) NEG^Negative /HPF   Wet prep    Specimen: Vagina   Result Value Ref Range    Specimen Description Vagina     Wet Prep Yeast seen (A)     Wet Prep No Trichomonas seen     Wet Prep No clue cells seen     Wet Prep WBC'S seen  Moderate

## 2021-04-28 NOTE — PATIENT INSTRUCTIONS
You have a yeast infection.  It's unclear whether you also may have a bladder infection.  Will wait for the urine culture due to underwhelming evidence of bladder infection.    Purchase 7-day monistat (miconazole) or generic vaginal yeast infection treatment.  Don't use the 3-day or 1-day.  If not getting better in the next 3 days, recheck in your clinic.     Since there is a little bacteria in the urine, have option to call OB/GYN and see if they want to treat you for UTI prior to the culture coming back.  I would have given you a 1-time dose of fosfomycin.       Patient Education     Yeast Infection (Candida Vaginal Infection)    You have a Candida vaginal infection. This is also known as a yeast infection. It's most often caused by a type of yeast (fungus) called Candida. Candida are normally found in the vagina. But if they increase in number, this can lead to infection and cause symptoms.   Symptoms of a yeast infection can include:     Clumpy or thin, white discharge, which may look like cottage cheese    Itching or burning    Burning with urination  Certain factors can make a yeast infection more likely. These can include:     Taking certain medicines, such as antibiotics or birth control pills    Pregnancy    Diabetes    Weak immune system  A yeast infection is most often treated with antifungal medicine. This may be given as a vaginal cream or pills you take by mouth. Treatment may last for about 1 to 7 days. Women with severe or recurrent infections may need longer courses of treatment.   Home care    If you re prescribed medicine, be sure to use it as directed. Finish all of the medicine, even if your symptoms go away. Don t try to treat yourself using over-the-counter products without talking with your provider first. They will let you know if this is a good option for you.    Ask your provider what steps you can take to help reduce your risk of having a yeast infection in the future.    Follow-up  care  Follow up with your healthcare provider, or as directed.   When to seek medical advice  Call your healthcare provider right away if:     You have a fever of 100.4 F (38 C) or higher, or as directed by your provider.    Your symptoms worsen, or they don t go away within a few days of starting treatment.    You have new pain in the lower belly or pelvic region.    You have side effects that bother you or a reaction to the cream or pills you re prescribed.    You or any partners you have sex with have new symptoms, such as a rash, joint pain, or sores.  [employer] last reviewed this educational content on 7/1/2020 2000-2021 [employer]. All rights reserved. This information is not intended as a substitute for professional medical care. Always follow your healthcare professional's instructions.

## 2021-05-10 ENCOUNTER — PRENATAL OFFICE VISIT (OUTPATIENT)
Dept: OBGYN | Facility: CLINIC | Age: 36
End: 2021-05-10
Payer: COMMERCIAL

## 2021-05-10 VITALS
WEIGHT: 142 LBS | SYSTOLIC BLOOD PRESSURE: 110 MMHG | BODY MASS INDEX: 23.6 KG/M2 | TEMPERATURE: 97.7 F | HEART RATE: 107 BPM | DIASTOLIC BLOOD PRESSURE: 64 MMHG

## 2021-05-10 DIAGNOSIS — Z87.728 HISTORY OF NEURAL TUBE DEFECT: ICD-10-CM

## 2021-05-10 DIAGNOSIS — Z34.92 NORMAL PREGNANCY IN SECOND TRIMESTER: Primary | ICD-10-CM

## 2021-05-10 PROCEDURE — 99207 PR PRENATAL VISIT: CPT | Performed by: OBSTETRICS & GYNECOLOGY

## 2021-05-10 NOTE — PROGRESS NOTES
"Presents for routine  appointment.    Taking zofran with improvement, doing ok.    No LOF/VB/Ctx.   ROS:   and GI negative.     /64   Pulse 107   Temp 97.7  F (36.5  C) (Temporal)   Wt 64.4 kg (142 lb)   LMP 2021   BMI 23.60 kg/m       FHT: 150bpm    A/P:  35 year old  at 13w0d NERY      ICD-10-CM    1. Normal pregnancy in second trimester  Z34.92    2. History of neural tube defect  Z87.728 Maternal Quad Marker 2nd Trimester     CANCELED: Maternal Quad Marker 2nd Trimester       Pregnancy c/b:  -Migraines: Reglan with tylenol  -Asthma: Allergy-induced, stable  -Neural tube defect: personal history, had MRI for nerve pain in leg, found mild case of spina bifida in lower 3 vertebrae in - records not available, MARK pending.  Denies any other symptoms. Reviewed folic acid supplementation in pregnancy, AFP at 15-22wks. Level II ultrasound. Plan for anesthesia consult 3rd trimester.  -Hip Dysplasia: discovered three years ago, reports \"clicking\" in hips but no other symptoms. Positioning during labor and delivery will need to be closely monitored, PT during pregnancy as needed.      Routine Prenatal Care:  -Genetic screening Quad at 15-22wks  -Discussed appropriate weight gain, diet, and exercise     Follow up in 4 weeks.      Kirti Levy, DO      "

## 2021-05-17 ENCOUNTER — MYC MEDICAL ADVICE (OUTPATIENT)
Dept: OBGYN | Facility: CLINIC | Age: 36
End: 2021-05-17

## 2021-05-19 NOTE — TELEPHONE ENCOUNTER
Original MARK that patient signed was misplaced. New MARK had to be filled out with info that patient provided filled out. Patient has upcoming appt with Dr Portillo on 6/7. Will fax to Preet Ramirez when Sigrid is back in clinic so it doesn't get misplaced as she is off today.    Debra oBnd MA  [facility] Care Team

## 2021-05-31 ENCOUNTER — MYC MEDICAL ADVICE (OUTPATIENT)
Dept: OBGYN | Facility: CLINIC | Age: 36
End: 2021-05-31

## 2021-06-01 ENCOUNTER — MYC MEDICAL ADVICE (OUTPATIENT)
Dept: OBGYN | Facility: CLINIC | Age: 36
End: 2021-06-01

## 2021-06-01 NOTE — TELEPHONE ENCOUNTER
Kirti Levy, DO  You 24 minutes ago (10:03 AM)     Quad test was ordered at last appointment. If patient would like to NIPT instead, we will need to order and fill out [employer] paperwork. Please clarify this is the test she would like. The gender can be revealed at the 20 weeks ultrasound if Quad screen is completed.   Please let me know so I can place orders if appropriate.     Thank you,   Kirti Levy, DO    Message text      Beatrice Peguero RN on 6/1/2021 at 10:29 AM

## 2021-06-01 NOTE — TELEPHONE ENCOUNTER
Pt currently 16w1d, last seen 5/10/2021, RN notes MQS lab orders placed.    RN routing to provider for advisement on any additional labs or if pt is having NIPT/gender test done with MFM.    Beatrice Peguero RN on 6/1/2021 at 7:49 AM

## 2021-06-02 NOTE — TELEPHONE ENCOUNTER
Pt has a lab only appt tomorrow.  In appt notes it states for prenatal testing and gender test.  I see that there is a quad screening lab order pending but no further lab orders.

## 2021-06-07 ENCOUNTER — PRENATAL OFFICE VISIT (OUTPATIENT)
Dept: OBGYN | Facility: OTHER | Age: 36
End: 2021-06-07
Payer: COMMERCIAL

## 2021-06-07 VITALS — WEIGHT: 145 LBS | DIASTOLIC BLOOD PRESSURE: 62 MMHG | SYSTOLIC BLOOD PRESSURE: 112 MMHG | BODY MASS INDEX: 24.1 KG/M2

## 2021-06-07 DIAGNOSIS — Z34.92 NORMAL PREGNANCY IN SECOND TRIMESTER: Primary | ICD-10-CM

## 2021-06-07 DIAGNOSIS — Q05.8 SPINA BIFIDA OF SACRAL REGION WITHOUT HYDROCEPHALUS (H): ICD-10-CM

## 2021-06-07 DIAGNOSIS — Z87.728 HISTORY OF NEURAL TUBE DEFECT: ICD-10-CM

## 2021-06-07 PROBLEM — Z23 NEED FOR TDAP VACCINATION: Status: ACTIVE | Noted: 2021-06-07

## 2021-06-07 PROCEDURE — 99000 SPECIMEN HANDLING OFFICE-LAB: CPT | Performed by: OBSTETRICS & GYNECOLOGY

## 2021-06-07 PROCEDURE — 36415 COLL VENOUS BLD VENIPUNCTURE: CPT | Performed by: OBSTETRICS & GYNECOLOGY

## 2021-06-07 PROCEDURE — 99207 PR PRENATAL VISIT: CPT | Performed by: OBSTETRICS & GYNECOLOGY

## 2021-06-07 PROCEDURE — 81511 FTL CGEN ABNOR FOUR ANAL: CPT | Performed by: OBSTETRICS & GYNECOLOGY

## 2021-06-07 NOTE — PROGRESS NOTES
35 year old  at 17w0d weeks presents to the clinic for a routine prenatal visit.  Feeling well.  No vaginal bleeding, leakage of fluid, or contractions   Fundal height=s=d  BWGm=521  Discussed quad screen and she wants this done.  Order already placed.  Patient will do today  Anatomy ultrasound already scheduled [facility].  History of spina bifida  Asthma=stable  RTC 4 weeks.    Gina Portillo, DO

## 2021-06-07 NOTE — PATIENT INSTRUCTIONS
Please call if you any questions.    [address]   [address]  [phone]        Gina Portillo,

## 2021-06-08 ENCOUNTER — MYC MEDICAL ADVICE (OUTPATIENT)
Dept: OBGYN | Facility: OTHER | Age: 36
End: 2021-06-08

## 2021-06-08 NOTE — TELEPHONE ENCOUNTER
, 17w1d.  Pt was seen yesterday, , for prenatal care with Dr. Portillo.    Pt is inquiring if we have received her MRI results.  I do not see MRI results scanned into pt's chart.  Per  mychart encounter, the original MARK to request pt's MRI was misplaced and it was going to be forwarded it to the MA working with Dr. Portillo so she could have pt sign at her appt yesterday.    Will route to the MA pool to see if someone had pt sign an MARK to get her MRI results.    Mary Meek RN

## 2021-06-08 NOTE — TELEPHONE ENCOUNTER
MARK was faxed to ER on 5/20. MA's unable to locate form unless someone from [facility] already faxed it? We are unsure. New MARK will have to be completed and signed by patient at next appointment.     Left message to make patient aware.    Debra Bond MA

## 2021-06-10 LAB
# FETUSES US: NORMAL
# FETUSES: NORMAL
AFP ADJ MOM AMN: 1.1
AFP SERPL-MCNC: 43 NG/ML
AGE - REPORTED: 34 YR
CURRENT SMOKER: NO
CURRENT SMOKER: NO
DIABETES STATUS PATIENT: NO
EGG DONOR AGE: NORMAL
FAMILY MEMBER DISEASES HX: YES
FAMILY MEMBER DISEASES HX: YES
GA METHOD: NORMAL
GA METHOD: NORMAL
GA: NORMAL WK
HCG MOM SERPL: 1.66
HCG SERPL-ACNC: NORMAL IU/L
HX OF HEREDITARY DISORDERS: NO
IDDM PATIENT QL: NO
INHIBIN A MOM SERPL: 0.95
INHIBIN A SERPL-MCNC: 144 PG/ML
INTEGRATED SCN PATIENT-IMP: NORMAL
IVF PREGNANCY: NO
LMP START DATE: NORMAL
MONOCHORIONIC TWINS: NO
PATHOLOGY STUDY: NORMAL
PREV FETUS DEFECT: NO
SERVICE CMNT-IMP: NO
SPECIMEN DRAWN SERPL: NORMAL
U ESTRIOL MOM SERPL: 0.77
U ESTRIOL SERPL-MCNC: 1.04 NG/ML
VALPROIC/CARBAMAZEPINE STATUS: NO
WEIGHT UNITS: NORMAL

## 2021-06-10 NOTE — TELEPHONE ENCOUNTER
Patient called back - informed her of information below. She expressed understanding. She might come into the clinic to fill out an MARK before her next appointment. Will be on the look out for this in Dr. Portillo's mailbox next time I am in [place].  Sigrid Doran [facility]

## 2021-06-21 ENCOUNTER — TRANSFERRED RECORDS (OUTPATIENT)
Dept: HEALTH INFORMATION MANAGEMENT | Facility: CLINIC | Age: 36
End: 2021-06-21

## 2021-06-24 ENCOUNTER — MYC MEDICAL ADVICE (OUTPATIENT)
Dept: OBGYN | Facility: OTHER | Age: 36
End: 2021-06-24

## 2021-06-24 DIAGNOSIS — Z87.728 HISTORY OF NEURAL TUBE DEFECT: ICD-10-CM

## 2021-06-24 NOTE — TELEPHONE ENCOUNTER
folic acid (FOLVITE) 1 MG tablet was sent to the [facility] in [place] on 3/11/21 with a  #90 but pt is taking 4 a day and 3 additional refills.    Mary Meek RN
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
## 2021-06-28 PROBLEM — O44.40 LOW-LYING PLACENTA: Status: ACTIVE | Noted: 2021-06-28

## 2021-06-30 RX ORDER — FOLIC ACID 1 MG/1
4 TABLET ORAL DAILY
Qty: 90 TABLET | Refills: 3 | Status: SHIPPED | OUTPATIENT
Start: 2021-06-30 | End: 2021-10-22

## 2021-07-12 ENCOUNTER — PRENATAL OFFICE VISIT (OUTPATIENT)
Dept: OBGYN | Facility: OTHER | Age: 36
End: 2021-07-12
Payer: COMMERCIAL

## 2021-07-12 VITALS — SYSTOLIC BLOOD PRESSURE: 110 MMHG | BODY MASS INDEX: 25.76 KG/M2 | DIASTOLIC BLOOD PRESSURE: 72 MMHG | WEIGHT: 155 LBS

## 2021-07-12 DIAGNOSIS — Q05.8 SPINA BIFIDA OF SACRAL REGION WITHOUT HYDROCEPHALUS (H): ICD-10-CM

## 2021-07-12 DIAGNOSIS — O44.40 LOW-LYING PLACENTA: ICD-10-CM

## 2021-07-12 DIAGNOSIS — Z34.92 NORMAL PREGNANCY IN SECOND TRIMESTER: ICD-10-CM

## 2021-07-12 PROCEDURE — 99207 PR PRENATAL VISIT: CPT | Performed by: OBSTETRICS & GYNECOLOGY

## 2021-07-12 NOTE — PATIENT INSTRUCTIONS
What to watch out for are: regular contractions every 5 min, vaginal bleeding, decreased fetal movement, or leakage of fluid.  Please call the office or go to L&D if you develop any of these signs and symptoms.      I will see you for your follow up appointment.  Please feel free to call if you have any questions or concerns.      Thanks,  Gina Portillo, DO

## 2021-07-12 NOTE — PROGRESS NOTES
35 year old  at 22w0d weeks presents to the clinic for a routine prenatal visit.  No concerns.  No leakage of fluid, vaginal bleeding, or contractions   Good fetal movement.  Fundal height: 23cm  FHTs:  144  Normal anatomy ultrasound but low lying placenta.  Order placed to repeat ultrasound at 28 weeks  Asthma=stable  RTC 4 weeks    Gina Portillo DO

## 2021-07-27 ENCOUNTER — OFFICE VISIT (OUTPATIENT)
Dept: URGENT CARE | Facility: URGENT CARE | Age: 36
End: 2021-07-27
Payer: COMMERCIAL

## 2021-07-27 VITALS
SYSTOLIC BLOOD PRESSURE: 135 MMHG | DIASTOLIC BLOOD PRESSURE: 89 MMHG | OXYGEN SATURATION: 100 % | TEMPERATURE: 98 F | HEART RATE: 98 BPM

## 2021-07-27 DIAGNOSIS — B96.89 BACTERIAL VAGINOSIS: Primary | ICD-10-CM

## 2021-07-27 DIAGNOSIS — Z3A.24 24 WEEKS GESTATION OF PREGNANCY: ICD-10-CM

## 2021-07-27 DIAGNOSIS — N76.0 BACTERIAL VAGINOSIS: Primary | ICD-10-CM

## 2021-07-27 DIAGNOSIS — N89.8 VAGINAL DISCHARGE: ICD-10-CM

## 2021-07-27 DIAGNOSIS — R30.0 DYSURIA: ICD-10-CM

## 2021-07-27 LAB
ALBUMIN UR-MCNC: NEGATIVE MG/DL
APPEARANCE UR: CLEAR
BILIRUB UR QL STRIP: NEGATIVE
CLUE CELLS: PRESENT
COLOR UR AUTO: YELLOW
GLUCOSE UR STRIP-MCNC: NEGATIVE MG/DL
HGB UR QL STRIP: NEGATIVE
KETONES UR STRIP-MCNC: ABNORMAL MG/DL
LEUKOCYTE ESTERASE UR QL STRIP: NEGATIVE
NITRATE UR QL: NEGATIVE
PH UR STRIP: 6 [PH] (ref 5–7)
SP GR UR STRIP: 1.02 (ref 1–1.03)
TRICHOMONAS, WET PREP: ABNORMAL
UROBILINOGEN UR STRIP-ACNC: 0.2 E.U./DL
WBC'S/HIGH POWER FIELD, WET PREP: ABNORMAL
YEAST, WET PREP: ABNORMAL

## 2021-07-27 PROCEDURE — 99214 OFFICE O/P EST MOD 30 MIN: CPT | Performed by: NURSE PRACTITIONER

## 2021-07-27 PROCEDURE — 87210 SMEAR WET MOUNT SALINE/INK: CPT | Performed by: NURSE PRACTITIONER

## 2021-07-27 PROCEDURE — 81003 URINALYSIS AUTO W/O SCOPE: CPT | Performed by: NURSE PRACTITIONER

## 2021-07-27 RX ORDER — METRONIDAZOLE 500 MG/1
500 TABLET ORAL 2 TIMES DAILY
Qty: 14 TABLET | Refills: 0 | Status: SHIPPED | OUTPATIENT
Start: 2021-07-27 | End: 2021-08-03

## 2021-07-27 NOTE — PROGRESS NOTES
"Assessment & Plan     Bacterial vaginosis    - metroNIDAZOLE (FLAGYL) 500 MG tablet  Dispense: 14 tablet; Refill: 0    Dysuria    - UA macro with reflex to Microscopic and Culture - Clinc Collect    Vaginal discharge    - Wet prep - lab collect  - Wet prep - lab collect    24 weeks gestation of pregnancy       Reviewed UA showing no UTI and wet prep showing bacterial vaginosis. Prescription sent to pharmacy for Metronidazole 500 mg twice daily for 7 days. Per UpToDate, \"Oral treatment is effective and has not been associated with adverse fetal or obstetric effects.\" Recommended rest, fluids, tylenol as needed for discomfort. Recommended discussing lower abdominal cramping with OB.     Follow-up with OB if symptoms persist for 3 days, and sooner if symptoms worsen or new symptoms develop.     Discussed red flag symptoms which warrant immediate visit in emergency room    All questions were answered and patient verbalized understanding. AVS reviewed with patient.     Vandana Lino, DNP, APRN, CNP 7/27/2021 7:02 PM  [facility]            Kurt Rush is a 35 year old female who presents to clinic today for the following health issues:  Chief Complaint   Patient presents with     Dysuria      Started last night. Burning when urinates, burns in vaginal area, feels like she was going more often yesterday, was smelling yesterday, more yellow discharge.      UTI    Onset of symptoms was last night  Course of illness is worsening  Current and associated symptoms dysuria, urinary frequncy, yellow discharge, fishy odor, lower abdominal discomfort  She has a history of urinary urgency and urinary incontinence and hasn't noticed a change.  Denies fever, chills, hematuria, vaginal bleeding  Treatment and measures tried None.   Predisposing factors include none. No history of recurrent UTI, kidney infection, kidney stone, diabetes   She is currently pregnant. She had a yeast infection recently. " Baby has been active.       Problem list, Medication list, Allergies, and Medical history reviewed in EPIC.    ROS:  Review of systems negative except for noted above        Objective    /89   Pulse 98   Temp 98  F (36.7  C) (Tympanic)   LMP 02/08/2021   SpO2 100%   Physical Exam  Constitutional:       General: She is not in acute distress.     Appearance: She is not toxic-appearing or diaphoretic.   Abdominal:      General: Bowel sounds are normal. There is no distension.      Palpations: Abdomen is soft.      Tenderness: There is no abdominal tenderness. There is no right CVA tenderness, left CVA tenderness, guarding or rebound.      Comments: Currently pregnant   Lymphadenopathy:      Cervical: No cervical adenopathy.   Neurological:      Mental Status: She is alert.          Labs:  Results for orders placed or performed in visit on 07/27/21   UA macro with reflex to Microscopic and Culture - Clinc Collect     Status: Abnormal    Specimen: Urine, Midstream   Result Value Ref Range    Color Urine Yellow Colorless, Straw, Light Yellow, Yellow    Appearance Urine Clear Clear    Glucose Urine Negative Negative mg/dL    Bilirubin Urine Negative Negative    Ketones Urine Trace (A) Negative mg/dL    Specific Gravity Urine 1.025 1.003 - 1.035    Blood Urine Negative Negative    pH Urine 6.0 5.0 - 7.0    Protein Albumin Urine Negative Negative mg/dL    Urobilinogen Urine 0.2 0.2, 1.0 E.U./dL    Nitrite Urine Negative Negative    Leukocyte Esterase Urine Negative Negative    Narrative    Microscopic not indicated   Wet prep - lab collect     Status: Abnormal    Specimen: Vagina; Swab   Result Value Ref Range    Trichomonas Absent Absent    Yeast Absent Absent    Clue Cells Present (A) Absent    WBCs/high power field 4+ (A) None

## 2021-07-27 NOTE — PATIENT INSTRUCTIONS
Patient Education     Bacterial Vaginosis    You have a vaginal infection called bacterial vaginosis (BV). Both good and bad bacteria are present in a healthy vagina. BV occurs when these bacteria get out of balance. The number of bad bacteria increase. And the number of good bacteria decrease. BV is linked with sexual activity, but it's not a sexually transmitted infection (STI).   BV may or may not cause symptoms. If symptoms do occur, they can include:     Thin, gray, milky-white, or sometimes green discharge    Unpleasant odor or  fishy  smell    Itching, burning, or pain in or around the vagina  It is not known what causes BV, but certain factors can make the problem more likely. These can include:     Douching    Spermicides    Use of antibiotics    Change in hormone levels with pregnancy, breastfeeding, or menopause    Having sex with a new partner    Having sex with more than one partner  BV will sometimes go away on its own. But treatment is often advised. This is because untreated BV can raise the risk of more serious health problems such as:     Pelvic inflammatory disease (PID)     delivery (giving birth to a baby early if you re pregnant)    HIV and some other sexually transmitted infections (STIs)    Infection after surgery on the reproductive organs  Home care  General care    BV is most often treated with medicines called antibiotics. These may be given as pills or as a vaginal cream. If antibiotics are prescribed, be sure to use them exactly as directed. And complete all of the medicine, even if your symptoms go away.    Don't douche or having sex during treatment.    If you have sex with a female partner, ask your healthcare provider if she should also be treated.  Prevention    Don't douche.    Don't have sex. If you do have sex, then take steps to lower your risk:  ? Use condoms when having sex.  ? Limit the number of sex partners you have.    Follow-up care  Follow up with your  healthcare provider, or as advised.   When to get medical advice  Call your healthcare provider right away if:     You have a fever of 100.4 F (38 C) or higher, or as directed by your provider.    Your symptoms get worse, or they don t go away within a few days of starting treatment.    You have new pain in the lower belly or pelvic region.    You have side effects that bother you or a reaction to the pills or cream you re prescribed.    You or any of your sex partners have new symptoms, such as a rash, joint pain, or sores.  [employer] last reviewed this educational content on 6/1/2020 2000-2021 [employer]. All rights reserved. This information is not intended as a substitute for professional medical care. Always follow your healthcare professional's instructions.

## 2021-08-09 ENCOUNTER — PRENATAL OFFICE VISIT (OUTPATIENT)
Dept: OBGYN | Facility: OTHER | Age: 36
End: 2021-08-09
Payer: COMMERCIAL

## 2021-08-09 VITALS — DIASTOLIC BLOOD PRESSURE: 72 MMHG | SYSTOLIC BLOOD PRESSURE: 120 MMHG | BODY MASS INDEX: 26.76 KG/M2 | WEIGHT: 161 LBS

## 2021-08-09 DIAGNOSIS — Z34.92 NORMAL PREGNANCY IN SECOND TRIMESTER: Primary | ICD-10-CM

## 2021-08-09 DIAGNOSIS — Q05.8 SPINA BIFIDA OF SACRAL REGION WITHOUT HYDROCEPHALUS (H): ICD-10-CM

## 2021-08-09 LAB
ABO/RH(D): NORMAL
ANTIBODY SCREEN: NEGATIVE
GLUCOSE 1H P 50 G GLC PO SERPL-MCNC: 148 MG/DL (ref 70–129)
HGB BLD-MCNC: 11.6 G/DL (ref 11.7–15.7)
SPECIMEN EXPIRATION DATE: NORMAL

## 2021-08-09 PROCEDURE — 86901 BLOOD TYPING SEROLOGIC RH(D): CPT | Performed by: OBSTETRICS & GYNECOLOGY

## 2021-08-09 PROCEDURE — 36415 COLL VENOUS BLD VENIPUNCTURE: CPT | Performed by: OBSTETRICS & GYNECOLOGY

## 2021-08-09 PROCEDURE — 82950 GLUCOSE TEST: CPT | Performed by: OBSTETRICS & GYNECOLOGY

## 2021-08-09 PROCEDURE — 99207 PR PRENATAL VISIT: CPT | Performed by: OBSTETRICS & GYNECOLOGY

## 2021-08-09 PROCEDURE — 86850 RBC ANTIBODY SCREEN: CPT | Performed by: OBSTETRICS & GYNECOLOGY

## 2021-08-09 PROCEDURE — 86900 BLOOD TYPING SEROLOGIC ABO: CPT | Performed by: OBSTETRICS & GYNECOLOGY

## 2021-08-09 NOTE — PROGRESS NOTES
35 year old  at 26w0d weeks presents to the clinic for a routine prenatal visit.  No concerns.  No vaginal bleeding, leakage of fluid, or contractions   Good fetal movement.  Fundal height=  28cm  FHTs= 128  Normal anatomy ultrasound but low lying placenta seen-repeat ultrasound in 3 weeks.  Patient has been on pelvic rest.  No vaginal bleeding   RTC 4 weeks  Headaches=stable  GCT today  Spina bifida=will try to get records from [facility].  Consult with anesthesia for epidural if needed.  I will talk to Isabel  Blood type:A+    Gina Portillo, DO

## 2021-08-10 DIAGNOSIS — R73.09 ABNORMAL GLUCOSE TOLERANCE TEST: Primary | ICD-10-CM

## 2021-08-16 ENCOUNTER — LAB (OUTPATIENT)
Dept: LAB | Facility: OTHER | Age: 36
End: 2021-08-16

## 2021-08-16 DIAGNOSIS — R73.09 ABNORMAL GLUCOSE TOLERANCE TEST: ICD-10-CM

## 2021-08-23 ENCOUNTER — TELEPHONE (OUTPATIENT)
Dept: FAMILY MEDICINE | Facility: OTHER | Age: 36
End: 2021-08-23

## 2021-08-23 ENCOUNTER — LAB (OUTPATIENT)
Dept: LAB | Facility: OTHER | Age: 36
End: 2021-08-23
Payer: COMMERCIAL

## 2021-08-23 ENCOUNTER — PRENATAL OFFICE VISIT (OUTPATIENT)
Dept: OBGYN | Facility: OTHER | Age: 36
End: 2021-08-23
Payer: COMMERCIAL

## 2021-08-23 ENCOUNTER — ANCILLARY PROCEDURE (OUTPATIENT)
Dept: ULTRASOUND IMAGING | Facility: OTHER | Age: 36
End: 2021-08-23
Attending: OBSTETRICS & GYNECOLOGY
Payer: COMMERCIAL

## 2021-08-23 VITALS — WEIGHT: 163 LBS | BODY MASS INDEX: 27.09 KG/M2 | DIASTOLIC BLOOD PRESSURE: 68 MMHG | SYSTOLIC BLOOD PRESSURE: 124 MMHG

## 2021-08-23 DIAGNOSIS — O44.40 LOW-LYING PLACENTA: ICD-10-CM

## 2021-08-23 DIAGNOSIS — Z34.93 NORMAL PREGNANCY IN THIRD TRIMESTER: Primary | ICD-10-CM

## 2021-08-23 DIAGNOSIS — R73.09 ABNORMAL GLUCOSE TOLERANCE TEST: ICD-10-CM

## 2021-08-23 LAB
GESTATIONAL GTT 1 HR POST DOSE: 130 MG/DL (ref 60–179)
GESTATIONAL GTT 2 HR POST DOSE: 124 MG/DL (ref 60–154)
GESTATIONAL GTT 3 HR POST DOSE: 151 MG/DL (ref 60–139)
GLUCOSE P FAST SERPL-MCNC: 77 MG/DL (ref 60–94)

## 2021-08-23 PROCEDURE — 90715 TDAP VACCINE 7 YRS/> IM: CPT | Performed by: OBSTETRICS & GYNECOLOGY

## 2021-08-23 PROCEDURE — 36415 COLL VENOUS BLD VENIPUNCTURE: CPT

## 2021-08-23 PROCEDURE — 82951 GLUCOSE TOLERANCE TEST (GTT): CPT

## 2021-08-23 PROCEDURE — 90471 IMMUNIZATION ADMIN: CPT | Performed by: OBSTETRICS & GYNECOLOGY

## 2021-08-23 PROCEDURE — 82952 GTT-ADDED SAMPLES: CPT

## 2021-08-23 PROCEDURE — 76816 OB US FOLLOW-UP PER FETUS: CPT | Performed by: RADIOLOGY

## 2021-08-23 PROCEDURE — 99207 PR PRENATAL VISIT: CPT | Performed by: OBSTETRICS & GYNECOLOGY

## 2021-08-23 NOTE — PROGRESS NOTES
35 year old  at 28w0d weeks presents to the clinic for a routine prenatal visit.  No concerns.  No vaginal bleeding, leakage of fluid, or contractions   Good fetal movement.  Fundal height=29cm  IQDs=082  GCT=waiting for 3 hour results  Hgb=11.6  Rh A+  RTC 2 weeks.  TDAP today  Ultrasound=no longer low lying placenta.  Abdominal circ is measuring 7%.  Difficulty with exam however will repeat in 2-4 weeks    Gina Portillo DO

## 2021-09-13 ENCOUNTER — ANCILLARY PROCEDURE (OUTPATIENT)
Dept: ULTRASOUND IMAGING | Facility: OTHER | Age: 36
End: 2021-09-13
Attending: OBSTETRICS & GYNECOLOGY
Payer: COMMERCIAL

## 2021-09-13 ENCOUNTER — PRENATAL OFFICE VISIT (OUTPATIENT)
Dept: OBGYN | Facility: OTHER | Age: 36
End: 2021-09-13
Payer: COMMERCIAL

## 2021-09-13 VITALS
HEART RATE: 68 BPM | BODY MASS INDEX: 27.38 KG/M2 | DIASTOLIC BLOOD PRESSURE: 68 MMHG | SYSTOLIC BLOOD PRESSURE: 104 MMHG | WEIGHT: 164.75 LBS | OXYGEN SATURATION: 99 %

## 2021-09-13 DIAGNOSIS — Z34.93 NORMAL PREGNANCY IN THIRD TRIMESTER: Primary | ICD-10-CM

## 2021-09-13 DIAGNOSIS — Q05.8 SPINA BIFIDA OF SACRAL REGION WITHOUT HYDROCEPHALUS (H): ICD-10-CM

## 2021-09-13 PROCEDURE — 76816 OB US FOLLOW-UP PER FETUS: CPT | Performed by: RADIOLOGY

## 2021-09-13 PROCEDURE — 99207 PR PRENATAL VISIT: CPT | Performed by: OBSTETRICS & GYNECOLOGY

## 2021-09-13 NOTE — PROGRESS NOTES
35 year old  at 31w0d weeks presents to the clinic for a routine prenatal visit.  No concerns. Patient denies any vaginal bleeding, leakage of fluid, or contractions   Good fetal movement.    Fundal height=31cm  INLs=912  GDS=failed one hour but passed three hour  Hgb=11.6  Asthma=stable  AC=7% at at 29 weeks.  Repeat ultrasound today in process  Headaches=stable  RTC 2 weeks    Gina Portillo DO

## 2021-09-20 ENCOUNTER — MYC MEDICAL ADVICE (OUTPATIENT)
Dept: OBGYN | Facility: CLINIC | Age: 36
End: 2021-09-20

## 2021-09-20 DIAGNOSIS — R11.0 NAUSEA: ICD-10-CM

## 2021-09-20 RX ORDER — ONDANSETRON 4 MG/1
4 TABLET, FILM COATED ORAL EVERY 8 HOURS PRN
Qty: 90 TABLET | Refills: 1 | Status: SHIPPED | OUTPATIENT
Start: 2021-09-20 | End: 2021-11-30

## 2021-09-20 NOTE — TELEPHONE ENCOUNTER
" 32w 0d. Last seen 21 for prenatal care.    Pt is to see dermatology for change in color of \"lumps\" on her back. Pt asking provider advice on any local anesthetics she should avoid due to pregnancy if areas of skin need to be removed by dermatologist.    Routing to provider for advice.    PRATIMA AlmarazN RN    "

## 2021-09-20 NOTE — TELEPHONE ENCOUNTER
"Requested Prescriptions   Pending Prescriptions Disp Refills     ondansetron (ZOFRAN) 4 MG tablet 90 tablet 1     Sig: Take 1 tablet (4 mg) by mouth every 8 hours as needed for nausea        Antivertigo/Antiemetic Agents Passed - 9/20/2021 12:11 PM        Passed - Recent (12 mo) or future (30 days) visit within the authorizing provider's specialty     Patient has had an office visit with the authorizing provider or a provider within the authorizing providers department within the previous 12 mos or has a future within next 30 days. See \"Patient Info\" tab in inbasket, or \"Choose Columns\" in Meds & Orders section of the refill encounter.              Passed - Medication is active on med list        Passed - Patient is 18 years of age or older           Last seen 9/13/21 for prenatal care.    Last prescribed on 4/23/21, 90 tablets with 1 refill.    Prescription approved per [facility] Refill Protocol.  DANIELA Almaraz RN        "

## 2021-09-20 NOTE — TELEPHONE ENCOUNTER
Please let patient know that skin/mole changes are very common in pregnancy.  I do not recommend removal in pregnancy unless there is a concern for malignancy.  She would have to see Dermatology for that.  I am happy to hear that she has an appointment with them next month.  I will take a look at them at her next appointment.  I hope this helps.      Gina Portillo, DO

## 2021-09-20 NOTE — TELEPHONE ENCOUNTER
Currently 32w 0d. Last seen 21 for prenatal care. Next appt is 21.    Pt contacting clinic asking if can take Reglan (for headaches) and Zofran(for nausea) at the same time. RN spoke w/London Cummings at [facility], who states using both medications at the same time are to be used with caution due to possible prolonged QT intervals for mother, though Pharmacist states there's not a lot of data regarding using both medications at same time.    RN did refill zofran today per pt request and per passed RX protocol.    Routing to provider for advice and safety of using both reglan and zofran at the same time.    Adri English, BSN RN

## 2021-09-21 NOTE — TELEPHONE ENCOUNTER
Yes, the Reglan can cause that so if the Zofran is working she should only use that.  I would encourage staying hydrated as well.  She could alter the two medications if needed.    Gina Portillo, DO

## 2021-09-23 ENCOUNTER — MYC MEDICAL ADVICE (OUTPATIENT)
Dept: OBGYN | Facility: CLINIC | Age: 36
End: 2021-09-23

## 2021-09-23 NOTE — TELEPHONE ENCOUNTER
Pt currently 32w3d, last seen 9/13/2021.    Pt currently having burning and odor with urination. Pt asking if outpatient wet prep and UA could be done or if pt needs to be seen.    RN routing to provider for advisement.    Beatrice Peguero RN on 9/23/2021 at 1:23 PM

## 2021-09-24 ENCOUNTER — NURSE TRIAGE (OUTPATIENT)
Dept: NURSING | Facility: CLINIC | Age: 36
End: 2021-09-24

## 2021-09-24 NOTE — TELEPHONE ENCOUNTER
Pt called and asked if she should still be seen in clinic like Dr. Portillo recommended below.    She states her cramping came and went away quickly and has not experienced any cramping since.  Her burning and pain with urination has also improved.  She states she has noticed an increase in vaginal discharge but states it is the normal vaginal discharge.  Pt also has a headache which has improved with Tylenol. Pt also states she has been experiencing constipation but had a small BM last night.    Denies vaginal itching, burning or a foul smell to her discharge.  Denies cramping, vaginal bleeding, LOF, increasing pelvic pressure, decreased fetal movement, upper abdominal pain, vision changes.    Since pt no longer has any concerning symptoms advised to increase fluid intake so she is drinking at least  ounces of fluids.  Call back if symptoms return or she develops other symptoms.    Pt verbalized understanding and agreed to plan.    Mary Meek RN

## 2021-09-24 NOTE — TELEPHONE ENCOUNTER
Caller has further questions for  [facility] after [employer] messages;   Call warm transferred to [facility] RN.   Subha Fernandez RN  FNA    Additional Information    Commented on: All Negative - Home Care     Call transferred to  [facility] RN    Protocols used: INFORMATION ONLY CALL - NO TRIAGE-A-OH

## 2021-09-24 NOTE — TELEPHONE ENCOUNTER
I think patient should be seen in the clinic especially since she is admitting to cramping.  A urine and wet prep can be done then.    Gina Portillo, DO

## 2021-09-26 ENCOUNTER — HEALTH MAINTENANCE LETTER (OUTPATIENT)
Age: 36
End: 2021-09-26

## 2021-09-28 ENCOUNTER — PRENATAL OFFICE VISIT (OUTPATIENT)
Dept: OBGYN | Facility: CLINIC | Age: 36
End: 2021-09-28
Payer: COMMERCIAL

## 2021-09-28 VITALS
DIASTOLIC BLOOD PRESSURE: 75 MMHG | HEART RATE: 79 BPM | BODY MASS INDEX: 28.26 KG/M2 | WEIGHT: 170 LBS | SYSTOLIC BLOOD PRESSURE: 120 MMHG | OXYGEN SATURATION: 97 %

## 2021-09-28 DIAGNOSIS — Z23 NEED FOR PROPHYLACTIC VACCINATION AND INOCULATION AGAINST INFLUENZA: ICD-10-CM

## 2021-09-28 DIAGNOSIS — L98.9 SKIN LESION: ICD-10-CM

## 2021-09-28 DIAGNOSIS — Z34.93 NORMAL PREGNANCY IN THIRD TRIMESTER: Primary | ICD-10-CM

## 2021-09-28 DIAGNOSIS — Q05.8 SPINA BIFIDA OF SACRAL REGION WITHOUT HYDROCEPHALUS (H): ICD-10-CM

## 2021-09-28 PROCEDURE — 90686 IIV4 VACC NO PRSV 0.5 ML IM: CPT | Performed by: OBSTETRICS & GYNECOLOGY

## 2021-09-28 PROCEDURE — 99207 PR PRENATAL VISIT: CPT | Performed by: OBSTETRICS & GYNECOLOGY

## 2021-09-28 PROCEDURE — 90471 IMMUNIZATION ADMIN: CPT | Performed by: OBSTETRICS & GYNECOLOGY

## 2021-09-28 NOTE — PROGRESS NOTES
35 year old  at 33w1d weeks presents to the clinic for a routine prenatal visit.    No concerns.  Patient denies any vaginal bleeding, leakage of fluid, or contractions     Good fetal movement  Fundal height=34cm  QYMu=670  Flu vaccine today  2 atypical appearing moles vs seborrhoic keratosis on her mid back under her bra line.  Referral placed to Derm  Discussed labor precautions.  RTC 2 weeks    Gina Portillo DO

## 2021-10-04 ENCOUNTER — TELEPHONE (OUTPATIENT)
Dept: FAMILY MEDICINE | Facility: OTHER | Age: 36
End: 2021-10-04

## 2021-10-04 ENCOUNTER — TELEPHONE (OUTPATIENT)
Dept: OBGYN | Facility: CLINIC | Age: 36
End: 2021-10-04

## 2021-10-04 ENCOUNTER — MYC MEDICAL ADVICE (OUTPATIENT)
Dept: OBGYN | Facility: OTHER | Age: 36
End: 2021-10-04

## 2021-10-04 DIAGNOSIS — Q05.8 SPINA BIFIDA OF SACRAL REGION WITHOUT HYDROCEPHALUS (H): Primary | ICD-10-CM

## 2021-10-04 NOTE — TELEPHONE ENCOUNTER
RN received call back from RUDI Galvez in [place] who states Dr. Montiel advises and MRI Lumbar w/o contrast and imaging can advise if contrast is needed.    RN queued order for Dr. Portillo to advise on signing.    Beatrice Peguero RN on 10/4/2021 at 4:19 PM

## 2021-10-04 NOTE — TELEPHONE ENCOUNTER
RN spoke with RUDI Galvez for FP in [place] and relayed pt is needing an MRI order placed for her spine as she has spina bifida and needs this completed prior to delivery due to desire for epidural.    Leonor states she reached out to two FP providers in  regarding an order and will send a staff message to Dr. Portillo if there are further questions/concerns.    RN will continue to check pt's chart for an order/update as this needs to be completed soon.    Beatrice Peguero RN on 10/4/2021 at 3:18 PM

## 2021-10-04 NOTE — TELEPHONE ENCOUNTER
Beatrice king from [facility] with Dr. Portillo calling stating patient is needing an MRI of the spine due to patient has spina bifida and is 34 weeks along but wanted Beatrice to reach out to family practice to find out what type of MRI to order, due to Dr. Portillo wasn't sure.    If you could review and advise and staff message Dr. Portillo on what order to order or if you need to do a visit with patient where could you fit her into.    Otherwise if you can call Beatrice king at [phone].    No primary provider so will send to Dr. Montiel and Dr. Downs to advise??

## 2021-10-04 NOTE — TELEPHONE ENCOUNTER
34w 0d.  Last seen for prenatal care on 21.  Next prenatal appt scheduled 10/11/21.    Pt hx of Spina Bifida    Pt states she had her anesthesia consult today for epidural options.  In order to decide if pt is an epidural candidate, anesthesia needs her MRI report to locate where her spina bifida is.  Pt is unable to locate her MRI medical records as the clinic she had it done at is now closed. The provider who did her MRI is now working at [facility]. Pt reached out to [facility] for her medical records but we haven't received them electronically. RN reaching out to both ER and MG in clinic staff to ensure records haven't been located at either sites. Faxed records were not found at [facility] clinic on Friday when I looked.      Pt is asking if she can have imaging (specifically an xray) to locate her spina bifida.    Routing to provider for advice/orders.    Adri English, PRATIMAN RN

## 2021-10-04 NOTE — TELEPHONE ENCOUNTER
I am going to assume that this is in preparation for a possible epidural.  If so, a lumbar MRI would be adequate.  If there is some other reason, it may be beneficial to have the patient visit with a primary care provider of her choice.    Thanks,    Dr. Montiel

## 2021-10-04 NOTE — TELEPHONE ENCOUNTER
If patient has already had the imaging, I would prefer to get that information instead of radiating her in pregnancy if at all possible.  Can we request those records again from O?    Gina Portillo, DO

## 2021-10-04 NOTE — TELEPHONE ENCOUNTER
Pt attempted again to obtain MRI from [facility], they do not have her records on file since the MRI was completed at a clinic that is no longer in business (Pt's provider now works at [facility], but is w/o her images). [facility] does have hip xrays on file for her however unlikely to show spine. Pt willing to send xrays to [facility] if needed.    Routing to provider for advice on further imaging or other advice.    PRATIMA AlmarazN RN

## 2021-10-05 NOTE — TELEPHONE ENCOUNTER
I placed the MRI order.  I see the patient did not schedule it.  Please reach out to patient.      Gina Portillo, DO

## 2021-10-11 ENCOUNTER — PRENATAL OFFICE VISIT (OUTPATIENT)
Dept: OBGYN | Facility: OTHER | Age: 36
End: 2021-10-11
Payer: COMMERCIAL

## 2021-10-11 VITALS — WEIGHT: 173 LBS | DIASTOLIC BLOOD PRESSURE: 76 MMHG | BODY MASS INDEX: 28.75 KG/M2 | SYSTOLIC BLOOD PRESSURE: 120 MMHG

## 2021-10-11 DIAGNOSIS — Z34.93 NORMAL PREGNANCY IN THIRD TRIMESTER: Primary | ICD-10-CM

## 2021-10-11 DIAGNOSIS — Q05.8 SPINA BIFIDA OF SACRAL REGION WITHOUT HYDROCEPHALUS (H): ICD-10-CM

## 2021-10-11 PROCEDURE — 99207 PR PRENATAL VISIT: CPT | Performed by: OBSTETRICS & GYNECOLOGY

## 2021-10-11 NOTE — PROGRESS NOTES
35 year old  at 35w0d weeks presents to the clinic for a routine prenatal visit.    No concerns.  Patient denies any vaginal bleeding, leakage of fluid, or contractions     Good fetal movement  Fundal height=37cm  IIKk=328  Discussed labor precautions.  RTC 2 weeks    Gina Portillo DO

## 2021-10-19 NOTE — PATIENT INSTRUCTIONS
If you have labs or imaging done, the results will automatically release in [employer] without an interpretation.  Your health care professional will review those results and send an interpretation with recommendations as soon as possible, but this may be 1-3 business days.    If you have any questions regarding your visit, please contact your care team.     [employer] Access Services: [phone]  [facility] CLINIC HOURS TELEPHONE NUMBER       MD Viridiana Hudson - BASIM Barron-MICHAEL Hernandez-MICHAEL Rush-MICHAEL Caicedo-  Jess-     Monday- [place]  8:00 a.m - 5:00 p.m    Tuesday- Surgery    Wednesday- Admin    Thursday- [place]  8:00 a.m - 5:00 p.m.    Friday- [place]  7:30 a.m - 4:00 p.m. [facility]  [address]  [place] [address]  [phone] Fax  Imaging [phone]    [facility] Labor and Delivery  [address] [place] [address] [place] [address]  [phone]    [facility]  [address] [facility] [place] [address]  [phone] Fax  Imaging [phone]     Urgent Care locations:    [facility] Monday-Friday  10 am - 8 pm  Saturday and Sunday   9 am - 5 pm  Monday-Friday   10 am - 8 pm  Saturday and Sunday   9 am - 5 pm   [phone]     If you need a medication refill, please contact your pharmacy. Please allow 3 business days for your refill to be completed.    As always, thank you for trusting us with your healthcare needs!

## 2021-10-20 ENCOUNTER — MYC MEDICAL ADVICE (OUTPATIENT)
Dept: OBGYN | Facility: OTHER | Age: 36
End: 2021-10-20

## 2021-10-21 NOTE — TELEPHONE ENCOUNTER
I called and spoke to patient.  I had spoken to Isabel Vicente yesterday and we reviewed the anethesiologists note.  It was recommended she get it the MRI done so we discussed this in detail.  All questions answered and patient verbalizes understanding.    Gina Portillo, DO

## 2021-10-22 ENCOUNTER — PRENATAL OFFICE VISIT (OUTPATIENT)
Dept: OBGYN | Facility: CLINIC | Age: 36
End: 2021-10-22
Payer: COMMERCIAL

## 2021-10-22 VITALS
WEIGHT: 178.8 LBS | BODY MASS INDEX: 29.72 KG/M2 | DIASTOLIC BLOOD PRESSURE: 87 MMHG | SYSTOLIC BLOOD PRESSURE: 134 MMHG | OXYGEN SATURATION: 100 % | HEART RATE: 100 BPM

## 2021-10-22 DIAGNOSIS — Z34.93 NORMAL PREGNANCY IN THIRD TRIMESTER: Primary | ICD-10-CM

## 2021-10-22 PROCEDURE — 99207 PR PRENATAL VISIT: CPT | Performed by: OBSTETRICS & GYNECOLOGY

## 2021-10-22 PROCEDURE — 87653 STREP B DNA AMP PROBE: CPT | Performed by: OBSTETRICS & GYNECOLOGY

## 2021-10-22 NOTE — PROGRESS NOTES
Presents for routine  appointment.     No complaints.    No abnormal discharge , no leaking fluid , no contractions , no vaginal bleeding    ROS:   and GI  negative.     Please see Prenatal Vitals and Notes Flowsheet for objective data.    A/P:  35 year old  at 36w4d       ICD-10-CM    1. Normal pregnancy in third trimester  Z34.93        Group B Strep collected today  Discussed labor and what to expect. Discussed when to go to the birth center.  Follow up in 1 week.      Mer Ascencio MD

## 2021-10-23 LAB
GP B STREP DNA SPEC QL NAA+PROBE: NEGATIVE
PATIENT PENICILLIN, AMOXICILLIN, CEPHALOSPORINS ALLERGY: YES

## 2021-10-26 ENCOUNTER — ANCILLARY PROCEDURE (OUTPATIENT)
Dept: MRI IMAGING | Facility: CLINIC | Age: 36
End: 2021-10-26
Attending: OBSTETRICS & GYNECOLOGY
Payer: COMMERCIAL

## 2021-10-26 DIAGNOSIS — Q05.8 SPINA BIFIDA OF SACRAL REGION WITHOUT HYDROCEPHALUS (H): ICD-10-CM

## 2021-10-26 PROCEDURE — 72148 MRI LUMBAR SPINE W/O DYE: CPT | Performed by: RADIOLOGY

## 2021-11-01 ENCOUNTER — PRENATAL OFFICE VISIT (OUTPATIENT)
Dept: OBGYN | Facility: OTHER | Age: 36
End: 2021-11-01
Payer: COMMERCIAL

## 2021-11-01 VITALS — SYSTOLIC BLOOD PRESSURE: 124 MMHG | WEIGHT: 178 LBS | BODY MASS INDEX: 29.59 KG/M2 | DIASTOLIC BLOOD PRESSURE: 68 MMHG

## 2021-11-01 DIAGNOSIS — O26.893 PREGNANCY HEADACHE IN THIRD TRIMESTER: ICD-10-CM

## 2021-11-01 DIAGNOSIS — Z34.93 NORMAL PREGNANCY IN THIRD TRIMESTER: Primary | ICD-10-CM

## 2021-11-01 DIAGNOSIS — R51.9 PREGNANCY HEADACHE IN THIRD TRIMESTER: ICD-10-CM

## 2021-11-01 DIAGNOSIS — Q05.8 SPINA BIFIDA OF SACRAL REGION WITHOUT HYDROCEPHALUS (H): ICD-10-CM

## 2021-11-01 LAB
CREAT UR-MCNC: 31 MG/DL
PROT UR-MCNC: 0.06 G/L
PROT/CREAT 24H UR: 0.19 G/G CR (ref 0–0.2)

## 2021-11-01 PROCEDURE — 84156 ASSAY OF PROTEIN URINE: CPT | Performed by: OBSTETRICS & GYNECOLOGY

## 2021-11-01 PROCEDURE — 99207 PR PRENATAL VISIT: CPT | Performed by: OBSTETRICS & GYNECOLOGY

## 2021-11-01 NOTE — PROGRESS NOTES
35 year old  at 38w0d weeks presents to the clinic for a routine prenatal visit.  Patient admits to a slight headache today.  She states she is somewhat dehydrated.  Will check a urine Pr/Cr.  Complains of feeling more pressure.  No vaginal bleeding, leakage of fluid, or contractions   Fundal height=38cm  QHNu=352's  CX=Cl/40/-3  Discussed labor precautions  RTC 1 week  We reviewed the MRI results in detail.  GBS=Negative    Gina Portillo DO

## 2021-11-07 ENCOUNTER — MYC MEDICAL ADVICE (OUTPATIENT)
Dept: OBGYN | Facility: CLINIC | Age: 36
End: 2021-11-07
Payer: COMMERCIAL

## 2021-11-08 ENCOUNTER — PRENATAL OFFICE VISIT (OUTPATIENT)
Dept: OBGYN | Facility: CLINIC | Age: 36
End: 2021-11-08
Payer: COMMERCIAL

## 2021-11-08 VITALS
DIASTOLIC BLOOD PRESSURE: 86 MMHG | OXYGEN SATURATION: 98 % | BODY MASS INDEX: 29.25 KG/M2 | WEIGHT: 176 LBS | HEART RATE: 96 BPM | SYSTOLIC BLOOD PRESSURE: 132 MMHG

## 2021-11-08 DIAGNOSIS — Z34.93 NORMAL PREGNANCY IN THIRD TRIMESTER: Primary | ICD-10-CM

## 2021-11-08 PROBLEM — Z23 NEED FOR TDAP VACCINATION: Status: RESOLVED | Noted: 2021-06-07 | Resolved: 2021-11-08

## 2021-11-08 PROCEDURE — 99207 PR PRENATAL VISIT: CPT | Performed by: OBSTETRICS & GYNECOLOGY

## 2021-11-08 NOTE — PROGRESS NOTES
Presents for routine  appointment.    Headache, sore throat, cough.  Started last night.  Covid antigen test negative.  PCR test pending.  No unusual difficulty breathing. Calf pain bilaterally yesterday, better today, no swelling.    No abnormal discharge, no leaking fluid, no contractions, no vaginal bleeding   ROS:   and GI  negative.     Please see Prenatal Vitals and Notes Flowsheet for objective data.  Extremities: Trace edema bilaterally, nontender, normal color and temperature    A/P:  35 year old  at 39w0d       ICD-10-CM    1. Normal pregnancy in third trimester  Z34.93        URI - antigen test can be a false negative.  Await Covid PCR test.  She will let us know.    Reportable signs and symptoms discussed  Labor precautions given   Follow up in 1 week.      Mer Ascencio MD

## 2021-11-08 NOTE — PATIENT INSTRUCTIONS
If you have labs or imaging done, the results will automatically release in [employer] without an interpretation.  Your health care professional will review those results and send an interpretation with recommendations as soon as possible, but this may be 1-3 business days.    If you have any questions regarding your visit, please contact your care team.     [employer] Access Services: [phone]  [facility] CLINIC HOURS TELEPHONE NUMBER       MD Viridiana Hudson - BASIM Barron-MICHAEL Hernandez-MICHAEL Rush-MICHAEL Caicedo-  Jess-     Monday- [place]  8:00 a.m - 5:00 p.m    Tuesday- Surgery    Wednesday- Admin    Thursday- [place]  8:00 a.m - 5:00 p.m.    Friday- [place]  7:30 a.m - 4:00 p.m. [facility]  [address]  [place] [address]  [phone] Fax  Imaging [phone]    [facility] Labor and Delivery  [address] [place] [address] [place] [address]  [phone]    [facility]  [address] [facility] [place], [place] [address] [phone] Fax  Imaging [phone]     Urgent Care locations:    [facility] Monday-Friday  10 am - 8 pm  Saturday and Sunday   9 am - 5 pm  Monday-Friday   10 am - 8 pm  Saturday and Sunday   9 am - 5 pm   [phone]     If you need a medication refill, please contact your pharmacy. Please allow 3 business days for your refill to be completed.    As always, thank you for trusting us with your healthcare needs!

## 2021-11-15 ENCOUNTER — PRENATAL OFFICE VISIT (OUTPATIENT)
Dept: OBGYN | Facility: OTHER | Age: 36
End: 2021-11-15
Payer: COMMERCIAL

## 2021-11-15 VITALS — SYSTOLIC BLOOD PRESSURE: 128 MMHG | BODY MASS INDEX: 29.92 KG/M2 | DIASTOLIC BLOOD PRESSURE: 76 MMHG | WEIGHT: 180 LBS

## 2021-11-15 DIAGNOSIS — O48.0 POST-TERM PREGNANCY, 40-42 WEEKS OF GESTATION: Primary | ICD-10-CM

## 2021-11-15 DIAGNOSIS — Q05.8 SPINA BIFIDA OF SACRAL REGION WITHOUT HYDROCEPHALUS (H): ICD-10-CM

## 2021-11-15 PROCEDURE — 59025 FETAL NON-STRESS TEST: CPT | Performed by: OBSTETRICS & GYNECOLOGY

## 2021-11-15 PROCEDURE — 99207 PR PRENATAL VISIT: CPT | Performed by: OBSTETRICS & GYNECOLOGY

## 2021-11-15 NOTE — PROGRESS NOTES
35 year old  at 40w1d weeks presents to the clinic for a routine prenatal visit.  No concerns.  Complains of feeling more pressure.  No vaginal bleeding, leakage of fluid, or contractions   Fundal height=40cm  KWXu=144  CX=deferred  Discussed labor precautions  Spina bifida=normal MRI  GBS=Negative      Postdates  NST IS:  Reactive (2 accl > 15 BPM in 20 min., each lasting approx. 15 seconds)  NST Baseline Rate 110's  Variability:  Average  Accelerations:Present  Variable Decelerations:No  Other Decelerations:No  Contractions: One    Due to baby's lower baseline and the amount of time it took for baby to become reactive, I recommended patient go to L&D for a BPP.  I called and spoke to charge RNLissy.  All questions answered and patient verbalizes understanding.    Gina Portillo, DO

## 2021-11-19 ENCOUNTER — TELEPHONE (OUTPATIENT)
Dept: OBGYN | Facility: CLINIC | Age: 36
End: 2021-11-19
Payer: COMMERCIAL

## 2021-11-19 NOTE — TELEPHONE ENCOUNTER
Reason for Call:  Other appointment    Detailed comments: Pt is calling to schedule a blood pressure check with Dr Portillo. Pt states she is supposed to follow up 3-5 days from discharge-11/18/21.    Phone Number Patient can be reached at: Home number on file [phone] (home)    Best Time: any    Can we leave a detailed message on this number? YES    Call taken on 11/19/2021 at 12:26 PM by Raven Felipe

## 2021-11-22 ENCOUNTER — TELEPHONE (OUTPATIENT)
Dept: OBGYN | Facility: CLINIC | Age: 36
End: 2021-11-22

## 2021-11-22 ENCOUNTER — ALLIED HEALTH/NURSE VISIT (OUTPATIENT)
Dept: FAMILY MEDICINE | Facility: OTHER | Age: 36
End: 2021-11-22
Payer: COMMERCIAL

## 2021-11-22 ENCOUNTER — NURSE TRIAGE (OUTPATIENT)
Dept: FAMILY MEDICINE | Facility: OTHER | Age: 36
End: 2021-11-22
Payer: COMMERCIAL

## 2021-11-22 ENCOUNTER — E-VISIT (OUTPATIENT)
Dept: OBGYN | Facility: CLINIC | Age: 36
End: 2021-11-22

## 2021-11-22 VITALS — DIASTOLIC BLOOD PRESSURE: 72 MMHG | SYSTOLIC BLOOD PRESSURE: 124 MMHG

## 2021-11-22 DIAGNOSIS — Z01.30 BLOOD PRESSURE CHECK: Primary | ICD-10-CM

## 2021-11-22 DIAGNOSIS — O13.9 GESTATIONAL HYPERTENSION, ANTEPARTUM: ICD-10-CM

## 2021-11-22 DIAGNOSIS — G43.909 MIGRAINE WITHOUT STATUS MIGRAINOSUS, NOT INTRACTABLE, UNSPECIFIED MIGRAINE TYPE: Primary | ICD-10-CM

## 2021-11-22 DIAGNOSIS — Z53.9 ERRONEOUS ENCOUNTER--DISREGARD: Primary | ICD-10-CM

## 2021-11-22 PROCEDURE — 99207 PR NO CHARGE NURSE ONLY: CPT

## 2021-11-22 RX ORDER — SUMATRIPTAN 50 MG/1
100 TABLET, FILM COATED ORAL
Qty: 10 TABLET | Refills: 0 | Status: SHIPPED | OUTPATIENT
Start: 2021-11-22 | End: 2023-08-15

## 2021-11-22 RX ORDER — BUTALBITAL, ACETAMINOPHEN AND CAFFEINE 50; 325; 40 MG/1; MG/1; MG/1
1 TABLET ORAL EVERY 4 HOURS PRN
Qty: 10 TABLET | Refills: 0 | Status: SHIPPED | OUTPATIENT
Start: 2021-11-22 | End: 2021-11-30

## 2021-11-22 NOTE — TELEPHONE ENCOUNTER
Erroneous.   E-visit not appropriate. RN to triage further, patient needs BP check with RN today and likely repeat HELLP labs.  Kirti Levy, DO

## 2021-11-22 NOTE — TELEPHONE ENCOUNTER
Recommend HELLP labs. Rx Fiorcet sent to pharmacy to see if this helps with headache. Do not take with tylenol. Also refilled her imitrex as this has worked in the past. If persistent or worsening symptoms, recommend evaluation.     Thank you,   Kirti Levy, DO     Unable to reach patient via phone.  Left message to call clinic back at [phone] or to check [id number] message.    Adri English, PRATIMAN RN

## 2021-11-22 NOTE — PROGRESS NOTES
Shannon Olivarez is a 35 year old patient who comes in today for a Blood Pressure check.  Initial BP:  /72   LMP 02/08/2021      Data Unavailable  Disposition: results routed to provider      BP done by Mica Rose

## 2021-11-22 NOTE — TELEPHONE ENCOUNTER
23 induced for hypertension/preeclampsia.    Pt returned call phone call to further triage headache concerns.    HA started Saturday, worsening today as the day goes on. Taking ibuprofen and tylenol,  takes the edge off, but not helping as much today as it has been.     feels headache in her forehead and behind eyes.    Visual changes - no, but light sensitive.   epigastric pain/RUQ pain - no, some very slight nausea w/o vomiting.   Dizziness - no, slightly lightheaded due to headache pain.    Both feet were swollen upon leaving hospital, more swollen yesterday due to being upright more, reports same today. Encouraged to elevate legs throughout the day and hydrate w/water. States is hydrating w/64oz water day, encouraged to increase 80-100oz/day as is also w/breast feeding.      States headache is only slightly better when laying vs standing or upright. Doesn't appear to be spinal headache.     Brought new born into clinic today, bp today and asked to have her BP checked, was: 124/72.    ED precautions given.    Routing to provider for advice for home monitoring, labs, or clinic or hospital visit, or otherwise.

## 2021-11-22 NOTE — TELEPHONE ENCOUNTER
Unable to reach patient via phone. RN left a message and instructed patient to call the clinic at [phone].    Pt needs to be triaged to rule out preeclampsia, pt needs BP visit with nurse today.    RN sent [employer] message for triage as well.    Beatrice Peguero RN on 11/22/2021 at 2:06 PM

## 2021-11-23 ENCOUNTER — LAB (OUTPATIENT)
Dept: LAB | Facility: OTHER | Age: 36
End: 2021-11-23
Payer: COMMERCIAL

## 2021-11-23 DIAGNOSIS — O13.9 GESTATIONAL HYPERTENSION, ANTEPARTUM: ICD-10-CM

## 2021-11-23 DIAGNOSIS — G43.909 MIGRAINE WITHOUT STATUS MIGRAINOSUS, NOT INTRACTABLE, UNSPECIFIED MIGRAINE TYPE: ICD-10-CM

## 2021-11-23 LAB
ALT SERPL W P-5'-P-CCNC: 52 U/L (ref 0–50)
AST SERPL W P-5'-P-CCNC: 26 U/L (ref 0–45)
BASOPHILS # BLD AUTO: 0 10E3/UL (ref 0–0.2)
BASOPHILS NFR BLD AUTO: 0 %
BUN SERPL-MCNC: 8 MG/DL (ref 7–30)
CREAT SERPL-MCNC: 0.53 MG/DL (ref 0.52–1.04)
CREAT UR-MCNC: 41 MG/DL
EOSINOPHIL # BLD AUTO: 0.3 10E3/UL (ref 0–0.7)
EOSINOPHIL NFR BLD AUTO: 3 %
ERYTHROCYTE [DISTWIDTH] IN BLOOD BY AUTOMATED COUNT: 12.8 % (ref 10–15)
GFR SERPL CREATININE-BSD FRML MDRD: >90 ML/MIN/1.73M2
HCT VFR BLD AUTO: 29.6 % (ref 35–47)
HGB BLD-MCNC: 9.6 G/DL (ref 11.7–15.7)
LYMPHOCYTES # BLD AUTO: 0.8 10E3/UL (ref 0.8–5.3)
LYMPHOCYTES NFR BLD AUTO: 8 %
MCH RBC QN AUTO: 30.9 PG (ref 26.5–33)
MCHC RBC AUTO-ENTMCNC: 32.4 G/DL (ref 31.5–36.5)
MCV RBC AUTO: 95 FL (ref 78–100)
MONOCYTES # BLD AUTO: 0.6 10E3/UL (ref 0–1.3)
MONOCYTES NFR BLD AUTO: 6 %
NEUTROPHILS # BLD AUTO: 8.3 10E3/UL (ref 1.6–8.3)
NEUTROPHILS NFR BLD AUTO: 83 %
PLATELET # BLD AUTO: 229 10E3/UL (ref 150–450)
PROT UR-MCNC: 0.25 G/L
PROT/CREAT 24H UR: 0.61 G/G CR (ref 0–0.2)
RBC # BLD AUTO: 3.11 10E6/UL (ref 3.8–5.2)
URATE SERPL-MCNC: 3.9 MG/DL (ref 2.6–6)
WBC # BLD AUTO: 10 10E3/UL (ref 4–11)

## 2021-11-23 PROCEDURE — 84460 ALANINE AMINO (ALT) (SGPT): CPT

## 2021-11-23 PROCEDURE — 84450 TRANSFERASE (AST) (SGOT): CPT

## 2021-11-23 PROCEDURE — 82565 ASSAY OF CREATININE: CPT

## 2021-11-23 PROCEDURE — 84520 ASSAY OF UREA NITROGEN: CPT

## 2021-11-23 PROCEDURE — 85025 COMPLETE CBC W/AUTO DIFF WBC: CPT

## 2021-11-23 PROCEDURE — 84156 ASSAY OF PROTEIN URINE: CPT

## 2021-11-23 PROCEDURE — 36415 COLL VENOUS BLD VENIPUNCTURE: CPT

## 2021-11-23 PROCEDURE — 84550 ASSAY OF BLOOD/URIC ACID: CPT

## 2021-11-24 ENCOUNTER — LAB (OUTPATIENT)
Dept: URGENT CARE | Facility: URGENT CARE | Age: 36
End: 2021-11-24
Attending: OBSTETRICS & GYNECOLOGY
Payer: COMMERCIAL

## 2021-11-24 DIAGNOSIS — R05.9 COUGH: ICD-10-CM

## 2021-11-24 PROCEDURE — U0005 INFEC AGEN DETEC AMPLI PROBE: HCPCS

## 2021-11-24 PROCEDURE — U0003 INFECTIOUS AGENT DETECTION BY NUCLEIC ACID (DNA OR RNA); SEVERE ACUTE RESPIRATORY SYNDROME CORONAVIRUS 2 (SARS-COV-2) (CORONAVIRUS DISEASE [COVID-19]), AMPLIFIED PROBE TECHNIQUE, MAKING USE OF HIGH THROUGHPUT TECHNOLOGIES AS DESCRIBED BY CMS-2020-01-R: HCPCS

## 2021-11-25 LAB — SARS-COV-2 RNA RESP QL NAA+PROBE: NEGATIVE

## 2021-11-30 ENCOUNTER — E-VISIT (OUTPATIENT)
Dept: URGENT CARE | Facility: CLINIC | Age: 36
End: 2021-11-30
Payer: COMMERCIAL

## 2021-11-30 DIAGNOSIS — R30.0 DIFFICULT OR PAINFUL URINATION: Primary | ICD-10-CM

## 2021-11-30 PROCEDURE — 99421 OL DIG E/M SVC 5-10 MIN: CPT | Performed by: EMERGENCY MEDICINE

## 2021-11-30 RX ORDER — NITROFURANTOIN 25; 75 MG/1; MG/1
100 CAPSULE ORAL 2 TIMES DAILY
Qty: 10 CAPSULE | Refills: 0 | Status: SHIPPED | OUTPATIENT
Start: 2021-11-30 | End: 2021-12-05

## 2021-11-30 NOTE — PATIENT INSTRUCTIONS
Dear Shannon Olivarez,    It is very important that you be seen in person for that we can test your urine will do a vaginal swab for a variety of infections which can be causing your symptoms.  This does not appear to be a routine urinary tract infection.  Please come into urgent care at which time we can do both and properly diagnose your symptoms as well as treat them appropriately.    We are sorry you are not feeling well. Based on the responses you provided, it is recommended that you be seen in-person in urgent care so we can better evaluate your symptoms. Please click here to find the nearest urgent care location to you.   You will not be charged for this Visit. Thank you for trusting us with your care.    Michael Metcalf MD

## 2021-12-01 ENCOUNTER — MEDICAL CORRESPONDENCE (OUTPATIENT)
Dept: HEALTH INFORMATION MANAGEMENT | Facility: CLINIC | Age: 36
End: 2021-12-01

## 2021-12-01 ENCOUNTER — OFFICE VISIT (OUTPATIENT)
Dept: FAMILY MEDICINE | Facility: OTHER | Age: 36
End: 2021-12-01
Payer: COMMERCIAL

## 2021-12-01 VITALS
HEART RATE: 85 BPM | TEMPERATURE: 98.7 F | SYSTOLIC BLOOD PRESSURE: 110 MMHG | DIASTOLIC BLOOD PRESSURE: 80 MMHG | BODY MASS INDEX: 26.18 KG/M2 | WEIGHT: 157.5 LBS | RESPIRATION RATE: 17 BRPM | OXYGEN SATURATION: 97 %

## 2021-12-01 DIAGNOSIS — R30.0 DYSURIA: Primary | ICD-10-CM

## 2021-12-01 DIAGNOSIS — N30.01 ACUTE CYSTITIS WITH HEMATURIA: ICD-10-CM

## 2021-12-01 LAB
ALBUMIN UR-MCNC: NEGATIVE MG/DL
APPEARANCE UR: CLEAR
BACTERIA #/AREA URNS HPF: ABNORMAL /HPF
BILIRUB UR QL STRIP: NEGATIVE
COLOR UR AUTO: YELLOW
GLUCOSE UR STRIP-MCNC: NEGATIVE MG/DL
HGB UR QL STRIP: ABNORMAL
KETONES UR STRIP-MCNC: NEGATIVE MG/DL
LEUKOCYTE ESTERASE UR QL STRIP: ABNORMAL
NITRATE UR QL: POSITIVE
PH UR STRIP: 5.5 [PH] (ref 5–7)
RBC #/AREA URNS AUTO: ABNORMAL /HPF
SP GR UR STRIP: 1.01 (ref 1–1.03)
UROBILINOGEN UR STRIP-ACNC: 0.2 E.U./DL
WBC #/AREA URNS AUTO: ABNORMAL /HPF

## 2021-12-01 PROCEDURE — 99213 OFFICE O/P EST LOW 20 MIN: CPT | Performed by: PHYSICIAN ASSISTANT

## 2021-12-01 PROCEDURE — 87086 URINE CULTURE/COLONY COUNT: CPT | Performed by: PHYSICIAN ASSISTANT

## 2021-12-01 PROCEDURE — 87186 SC STD MICRODIL/AGAR DIL: CPT | Performed by: PHYSICIAN ASSISTANT

## 2021-12-01 PROCEDURE — 81001 URINALYSIS AUTO W/SCOPE: CPT | Performed by: PHYSICIAN ASSISTANT

## 2021-12-01 RX ORDER — LORATADINE 10 MG/1
10 TABLET ORAL
COMMUNITY
Start: 2021-05-01 | End: 2023-05-01

## 2021-12-01 RX ORDER — IBUPROFEN 200 MG
600 TABLET ORAL
COMMUNITY
Start: 2021-11-17 | End: 2022-07-11

## 2021-12-01 RX ORDER — LACTOBACILLUS RHAMNOSUS GG 10B CELL
CAPSULE ORAL
COMMUNITY
Start: 2021-04-01 | End: 2023-05-01

## 2021-12-01 RX ORDER — ACETAMINOPHEN 500 MG
TABLET ORAL
COMMUNITY
Start: 2021-11-17 | End: 2022-07-11

## 2021-12-01 RX ORDER — LABETALOL 300 MG/1
300 TABLET, FILM COATED ORAL EVERY 8 HOURS
COMMUNITY
Start: 2021-11-24 | End: 2021-12-28

## 2021-12-01 RX ORDER — MULTIVITAMIN/IRON/FOLIC ACID 18MG-0.4MG
1 TABLET ORAL DAILY
COMMUNITY

## 2021-12-01 NOTE — PROGRESS NOTES
Assessment & Plan     Acute cystitis with hematuria  She was prescribed macrobid yesterday but has not filled yet, I agreed after my discussed with Dr. Montiel that that is liklyl the best choice given her allergies and her breasting feeding status  ** if her symptoms are not improving in the next 1-2 days, she needs to be seen by her OB provider**  Dysuria    - UA Macro with Reflex to Micro and Culture - lab collect; Future  - UA Macro with Reflex to Micro and Culture - lab collect  - Urine Microscopic Exam  - Urine Culture         Return in about 2 days (around 12/3/2021), or if symptoms worsen or fail to improve.    MARY Dumont [facility] RASHAWN Rush is a 36 year old who presents for the following health issues     History of Present Illness       She eats 2-3 servings of fruits and vegetables daily.She consumes 1 sweetened beverage(s) daily.She exercises with enough effort to increase her heart rate 10 to 19 minutes per day.  She exercises with enough effort to increase her heart rate 3 or less days per week.   She is taking medications regularly.       Genitourinary - Female  She had a vaginal delivery 11-.  She did a virtual visit yesterday and was prescribed macrobid but also told to be seen in person  Onset/Duration: 11/29/21, 2 days ago  Description:   Painful urination (Dysuria): YES, a bit better today           Frequency: no  Blood in urine (Hematuria): YES, still bleeding after her delivery  Delay in urine (Hesitency): no  Intensity: moderate  Progression of Symptoms:  improving  Accompanying Signs & Symptoms:  Fever/chills: no  Flank pain: no  Nausea and vomiting: no  Vaginal symptoms: discharge  Abdominal/Pelvic Pain: YES, suprapubic  History:   History of frequent UTI s: YES, this set of symptoms are similar to previous UTI  History of kidney stones: no  Sexually Active: no  Possibility of pregnancy: No  Precipitating or alleviating factors:  None  Therapies tried and outcome:  none     Vaginal Symptoms  Onset/Duration: 11/29/21  Description:  Vaginal Discharge: brown   Itching (Pruritis): YES  Burning sensation:  YES- may have some inner burning symptoms, not as much outer burning  Odor: no  Accompanying Signs & Symptoms:  Urinary symptoms: YES see above  Abdominal pain: no  Fever: no  History:   Sexually active: no  New Partner: no  Possibility of Pregnancy:  no  Recent antibiotic use: YES  Previous vaginitis issues: YES- vaginal birth  Precipitating or alleviating factors: None  Therapies tried and outcome: none        Review of Systems   As documented above       Objective    /80   Pulse 85   Temp 98.7  F (37.1  C) (Temporal)   Resp 17   Wt 71.4 kg (157 lb 8 oz)   LMP 02/08/2021   SpO2 97%   BMI 26.18 kg/m    Body mass index is 26.18 kg/m .  Physical Exam   GENERAL: healthy, alert and no distress  NECK: no adenopathy, no asymmetry, masses, or scars and thyroid normal to palpation  RESP: lungs clear to auscultation - no rales, rhonchi or wheezes  CV: regular rate and rhythm, normal S1 S2, no S3 or S4, no murmur, click or rub, no peripheral edema and peripheral pulses strong  ABDOMEN: soft, nontender, no hepatosplenomegaly, no masses and bowel sounds normal  External genital exam- some blood noted, no erythematous mucosa at vaginal introitus   MS: no gross musculoskeletal defects noted, no edema  BACK: no CVA tenderness, no paralumbar tenderness  PSYCH: mentation appears normal, affect normal/bright    Results for orders placed or performed in visit on 12/01/21   UA Macro with Reflex to Micro and Culture - lab collect     Status: Abnormal    Specimen: Urine, Clean Catch   Result Value Ref Range    Color Urine Yellow Colorless, Straw, Light Yellow, Yellow    Appearance Urine Clear Clear    Glucose Urine Negative Negative mg/dL    Bilirubin Urine Negative Negative    Ketones Urine Negative Negative mg/dL    Specific Gravity Urine 1.015 1.003  - 1.035    Blood Urine Large (A) Negative    pH Urine 5.5 5.0 - 7.0    Protein Albumin Urine Negative Negative mg/dL    Urobilinogen Urine 0.2 0.2, 1.0 E.U./dL    Nitrite Urine Positive (A) Negative    Leukocyte Esterase Urine Large (A) Negative   Urine Microscopic Exam     Status: Abnormal   Result Value Ref Range    Bacteria Urine Moderate (A) None Seen /HPF    RBC Urine 2-5 (A) 0-2 /HPF /HPF    WBC Urine 25-50 (A) 0-5 /HPF /HPF

## 2021-12-02 ENCOUNTER — MYC MEDICAL ADVICE (OUTPATIENT)
Dept: OBGYN | Facility: OTHER | Age: 36
End: 2021-12-02
Payer: COMMERCIAL

## 2021-12-02 NOTE — TELEPHONE ENCOUNTER
Vaginal delivery on 11/15/21.    Pt states she continues to have intermittent mild headaches that excedrin is helping but getting rid of it completely.  Does have a UTI as well as congestion.    Denies blurred or double vision.    Pt states she is probably isn't quite drinking 80 ounces of fluids daily, she is taking allergy medication for her allergies but still has lots of congestion.    She is wondering if she should try her Imitrex that Dr. Levy prescribed to her last week or if she should hold off since her BPs have been around 130s/90s.  She states she had one at the clinic yesterday that was better than that.  She has noticed her home BP cuff was reading a lot different than the one at the clinic.    BP Readings from Last 3 Encounters:   12/01/21 110/80   11/22/21 124/72   11/15/21 128/76     Routing to Dr. Levy to comment on if pt should take her Imitrex as needed for headaches.    Mary Meek RN

## 2021-12-03 LAB — BACTERIA UR CULT: ABNORMAL

## 2021-12-03 NOTE — TELEPHONE ENCOUNTER
" remove flag  Kirti Levy, DO  [facility] Triage  \"Agree with RN recommendations. Restart allergy medication, consider Flonase for further relief. If headaches not improved, try Fiorcet or Imitrex and let us know how she is doing. Has she checked her BP at home?\"    "

## 2021-12-15 ENCOUNTER — MEDICAL CORRESPONDENCE (OUTPATIENT)
Dept: HEALTH INFORMATION MANAGEMENT | Facility: CLINIC | Age: 36
End: 2021-12-15
Payer: COMMERCIAL

## 2021-12-28 ENCOUNTER — MYC MEDICAL ADVICE (OUTPATIENT)
Dept: OBGYN | Facility: OTHER | Age: 36
End: 2021-12-28
Payer: COMMERCIAL

## 2021-12-28 DIAGNOSIS — O13.9 GESTATIONAL HYPERTENSION, ANTEPARTUM: Primary | ICD-10-CM

## 2021-12-28 NOTE — TELEPHONE ENCOUNTER
Pt's next appt is on 1/3 for PP. Pt has 1 day remaining of Labetalol and Nifedipine medications.    Pt denies headaches vision changes or upper abdominal pain\, has not taken BP regularly.     12/18- 112/74, few days before this was 115/83.    RN routing to provider regarding need to reorder these medications to get her to her PP.    Beatrice Peguero RN on 12/28/2021 at 10:48 AM

## 2021-12-29 RX ORDER — LABETALOL 300 MG/1
300 TABLET, FILM COATED ORAL EVERY 8 HOURS
Qty: 30 TABLET | Refills: 0 | Status: SHIPPED | OUTPATIENT
Start: 2021-12-29 | End: 2022-07-11

## 2021-12-29 NOTE — TELEPHONE ENCOUNTER
"Kirti Levy DO  [facility] Triage  Caller: Unspecified (Yesterday,  2:30 AM)  \"Rx refilled for 30 days. Recommend continuing until seen in the office to verify Bps and discuss potentially weaning medications.   Thank you,   Kirti Levy, \"    "

## 2021-12-29 NOTE — TELEPHONE ENCOUNTER
Kirti Levy, DO  [facility] Triage Just now (7:33 AM)     KK    How often is she forgetting her Bps meds? If Bps are normal and she is forgetting meds often, then would advise against refill until seen in the office as she may be able to stop alltogether.     Thank you,   Kirti Levy,      Pt had taken her BP medications every day as prescribed.  However, she has forgotten to take her blood pressure readings.    Routing back to Dr Levy to see if she wants to approve a small refill of pt's BP meds to get her to her PP visit on 1/3 as she took her last doses yesterday.    Mary Meek RN

## 2022-01-03 ENCOUNTER — TELEPHONE (OUTPATIENT)
Dept: OBGYN | Facility: CLINIC | Age: 37
End: 2022-01-03

## 2022-01-03 ENCOUNTER — MYC MEDICAL ADVICE (OUTPATIENT)
Dept: OBGYN | Facility: OTHER | Age: 37
End: 2022-01-03

## 2022-01-03 ENCOUNTER — PRENATAL OFFICE VISIT (OUTPATIENT)
Dept: OBGYN | Facility: OTHER | Age: 37
End: 2022-01-03
Payer: COMMERCIAL

## 2022-01-03 VITALS — BODY MASS INDEX: 28.26 KG/M2 | WEIGHT: 170 LBS | SYSTOLIC BLOOD PRESSURE: 120 MMHG | DIASTOLIC BLOOD PRESSURE: 72 MMHG

## 2022-01-03 DIAGNOSIS — R30.0 DYSURIA: ICD-10-CM

## 2022-01-03 DIAGNOSIS — R30.0 DYSURIA: Primary | ICD-10-CM

## 2022-01-03 DIAGNOSIS — Z30.09 BIRTH CONTROL COUNSELING: ICD-10-CM

## 2022-01-03 PROBLEM — Z34.93 NORMAL PREGNANCY IN THIRD TRIMESTER: Status: RESOLVED | Noted: 2021-06-07 | Resolved: 2022-01-03

## 2022-01-03 LAB
ALBUMIN UR-MCNC: NEGATIVE MG/DL
APPEARANCE UR: CLEAR
BACTERIA #/AREA URNS HPF: ABNORMAL /HPF
BILIRUB UR QL STRIP: NEGATIVE
COLOR UR AUTO: YELLOW
GLUCOSE UR STRIP-MCNC: NEGATIVE MG/DL
HGB UR QL STRIP: ABNORMAL
KETONES UR STRIP-MCNC: NEGATIVE MG/DL
LEUKOCYTE ESTERASE UR QL STRIP: ABNORMAL
NITRATE UR QL: NEGATIVE
PH UR STRIP: 6 [PH] (ref 5–7)
RBC #/AREA URNS AUTO: ABNORMAL /HPF
SP GR UR STRIP: 1.01 (ref 1–1.03)
SQUAMOUS #/AREA URNS AUTO: ABNORMAL /LPF
UROBILINOGEN UR STRIP-ACNC: 0.2 E.U./DL
WBC #/AREA URNS AUTO: ABNORMAL /HPF
WBC CLUMPS #/AREA URNS HPF: PRESENT /HPF

## 2022-01-03 PROCEDURE — 99207 PR POST PARTUM EXAM: CPT | Performed by: OBSTETRICS & GYNECOLOGY

## 2022-01-03 PROCEDURE — 87186 SC STD MICRODIL/AGAR DIL: CPT | Performed by: OBSTETRICS & GYNECOLOGY

## 2022-01-03 PROCEDURE — G0145 SCR C/V CYTO,THINLAYER,RESCR: HCPCS | Performed by: OBSTETRICS & GYNECOLOGY

## 2022-01-03 PROCEDURE — 81001 URINALYSIS AUTO W/SCOPE: CPT | Performed by: OBSTETRICS & GYNECOLOGY

## 2022-01-03 PROCEDURE — 87086 URINE CULTURE/COLONY COUNT: CPT | Performed by: OBSTETRICS & GYNECOLOGY

## 2022-01-03 PROCEDURE — 87624 HPV HI-RISK TYP POOLED RSLT: CPT | Performed by: OBSTETRICS & GYNECOLOGY

## 2022-01-03 RX ORDER — NITROFURANTOIN 25; 75 MG/1; MG/1
100 CAPSULE ORAL 2 TIMES DAILY
Qty: 14 CAPSULE | Refills: 0 | Status: SHIPPED | OUTPATIENT
Start: 2022-01-03 | End: 2022-07-11

## 2022-01-03 ASSESSMENT — ANXIETY QUESTIONNAIRES
1. FEELING NERVOUS, ANXIOUS, OR ON EDGE: NOT AT ALL
5. BEING SO RESTLESS THAT IT IS HARD TO SIT STILL: NOT AT ALL
GAD7 TOTAL SCORE: 0
6. BECOMING EASILY ANNOYED OR IRRITABLE: NOT AT ALL
4. TROUBLE RELAXING: NOT AT ALL
7. FEELING AFRAID AS IF SOMETHING AWFUL MIGHT HAPPEN: NOT AT ALL
GAD7 TOTAL SCORE: 0
7. FEELING AFRAID AS IF SOMETHING AWFUL MIGHT HAPPEN: NOT AT ALL
GAD7 TOTAL SCORE: 0
3. WORRYING TOO MUCH ABOUT DIFFERENT THINGS: NOT AT ALL
2. NOT BEING ABLE TO STOP OR CONTROL WORRYING: NOT AT ALL

## 2022-01-03 NOTE — PROGRESS NOTES
Subjective  36 year old non-pregnant female presents today for a postpartum visit.  Patient had an  on 2021.  No pain.  No vaginal bleeding.  No problems urinating however today she has had some dysuria.  No hematuria..  Normal bowel movements.  Patient is breast feeding.  No signs and symptoms of ppd.  Patient scored a 1 on the PHQ-9 and a 1 on the SKINNY-7.  No thoughts of suicide or infanticide.  Patient is due for a pap smear today.  Patient is unsure what she wants to do for birth control.  Information provided.  Patient had postpartum Pre-E.  Patient is still taking Labetalol and Nifedipine.  Occasional mild headaches or vision changes.  No epigastric pain or RUQ pain.  We discussed weaning off of the hypertension meds and submitting some blood pressures after.        ROS: 10 point ROS neg other than the symptoms noted above in the HPI.  Past Medical History:   Diagnosis Date     Acne clear    sees derm Dr. Raygoza     Allergic rhinitis, cause unspecified     dr. joel GIBSON (allergic rhinitis)      Asthma      High cholesterol      Hypertension 10/09/2014    High blood pressure again noted at [facility]gent [facility] 14     Melanoma of scalp or neck (H)      Motion sickness 2013     Spina bifida (H) 2006    seen on xray when in college     Past Surgical History:   Procedure Laterality Date     NO HISTORY OF SURGERY       Family History   Problem Relation Age of Onset     Allergies Mother      Asthma Mother      Allergies Father      Prostate Cancer Father      Cancer Maternal Grandfather         skin     Heart Disease Maternal Grandfather      Cardiovascular Maternal Grandfather      Lipids Maternal Grandfather      Other Cancer Maternal Grandfather         Lymphoma     Unknown/Adopted Paternal Grandmother      Unknown/Adopted Paternal Grandfather      Asthma Brother      Allergies Brother      Hypertension Maternal Grandmother      Cerebrovascular Disease Maternal Grandmother      Breast Cancer  Other         Paternal Aunt     Breast Cancer Other         Paternal Aunt     Mental Illness Other         Maternal Uncle - Schizophrenia     Asthma Brother      Social History     Tobacco Use     Smoking status: Never Smoker     Smokeless tobacco: Never Used   Substance Use Topics     Alcohol use: Yes         Objective  Vitals: /72 (BP Location: Right arm, Cuff Size: Adult Regular)   Wt 77.1 kg (170 lb)   LMP 2021   Breastfeeding Yes   BMI 28.26 kg/m    BMI= Body mass index is 28.26 kg/m .         PELVIC:    External genitalia: normal without lesion  Vagina: normal mucosa and rugae, no discharge.  Cervix: normal without lesion, healthy, pap smear obtained  Uterus: small, mobile, nontender.  Adnexa: non tender, without masses  Rectal: deffered  Ext=no clubbing or cyanosis      Assessment  1.)  Post partum visit  2.)  S/p  on   3.)  Birth control   4.)  Due for pap smear  5.)  PP Pre-E  6.)  Dysuria       Plan  1.)  Pap smear  2.)  Wean off of HTN meds  3.)  Send blood pressures for review  4.)  Follow-up when ready for birth control   5.)  U/A      Gina Portillo

## 2022-01-03 NOTE — TELEPHONE ENCOUNTER
[facility] Call Center    Phone Message    May a detailed message be left on voicemail: no     Reason for Call: Other: Pt returning call to Dr Portillo for results.  Please call back.      Action Taken: Message routed to:  [facility] p [phone]    Travel Screening: Not Applicable

## 2022-01-03 NOTE — PATIENT INSTRUCTIONS
Please call if you any questions.    [address]   [address]  [phone]        Gina Portillo,

## 2022-01-04 ASSESSMENT — PATIENT HEALTH QUESTIONNAIRE - PHQ9: SUM OF ALL RESPONSES TO PHQ QUESTIONS 1-9: 1

## 2022-01-04 ASSESSMENT — ANXIETY QUESTIONNAIRES: GAD7 TOTAL SCORE: 0

## 2022-01-05 LAB
BACTERIA UR CULT: ABNORMAL
BKR LAB AP GYN ADEQUACY: NORMAL
BKR LAB AP GYN INTERPRETATION: NORMAL
BKR LAB AP HPV REFLEX: NORMAL
BKR LAB AP PREVIOUS ABNORMAL: NORMAL
PATH REPORT.COMMENTS IMP SPEC: NORMAL
PATH REPORT.COMMENTS IMP SPEC: NORMAL
PATH REPORT.RELEVANT HX SPEC: NORMAL

## 2022-01-06 LAB
HUMAN PAPILLOMA VIRUS 16 DNA: NEGATIVE
HUMAN PAPILLOMA VIRUS 18 DNA: NEGATIVE
HUMAN PAPILLOMA VIRUS FINAL DIAGNOSIS: NORMAL
HUMAN PAPILLOMA VIRUS OTHER HR: NEGATIVE

## 2022-01-14 ENCOUNTER — MYC MEDICAL ADVICE (OUTPATIENT)
Dept: OBGYN | Facility: OTHER | Age: 37
End: 2022-01-14
Payer: COMMERCIAL

## 2022-01-14 NOTE — TELEPHONE ENCOUNTER
I think patient is ok to continue to stay off of the blood pressure medications.  She should continue to monitor her signs and symptoms and let me know if anything changes.      Gina Portillo, DO

## 2022-01-14 NOTE — TELEPHONE ENCOUNTER
Pt had  on 2021, hx preeclampsia in post partum period.    Pt stopped labetalol as discussed on 2022, BP on 1/10 was 126/79 and on  was 140/99. Pt had slight headache, has resolved this AM, denies vision changes or upper abdominal pain.    RN advised continued monitoring and to recheck BP. RN routing to provider for any additional recommendations.    Beatrice Peguero RN on 2022 at 8:54 AM

## 2022-01-19 ENCOUNTER — MEDICAL CORRESPONDENCE (OUTPATIENT)
Dept: HEALTH INFORMATION MANAGEMENT | Facility: CLINIC | Age: 37
End: 2022-01-19
Payer: COMMERCIAL

## 2022-03-21 ENCOUNTER — MEDICAL CORRESPONDENCE (OUTPATIENT)
Dept: HEALTH INFORMATION MANAGEMENT | Facility: CLINIC | Age: 37
End: 2022-03-21
Payer: COMMERCIAL

## 2022-05-18 ENCOUNTER — MYC MEDICAL ADVICE (OUTPATIENT)
Dept: OBGYN | Facility: OTHER | Age: 37
End: 2022-05-18
Payer: COMMERCIAL

## 2022-05-23 ENCOUNTER — MEDICAL CORRESPONDENCE (OUTPATIENT)
Dept: HEALTH INFORMATION MANAGEMENT | Facility: CLINIC | Age: 37
End: 2022-05-23

## 2022-07-11 ENCOUNTER — OFFICE VISIT (OUTPATIENT)
Dept: FAMILY MEDICINE | Facility: OTHER | Age: 37
End: 2022-07-11
Payer: COMMERCIAL

## 2022-07-11 VITALS
HEIGHT: 65 IN | BODY MASS INDEX: 25.91 KG/M2 | WEIGHT: 155.5 LBS | TEMPERATURE: 97.2 F | HEART RATE: 90 BPM | RESPIRATION RATE: 24 BRPM | OXYGEN SATURATION: 96 % | DIASTOLIC BLOOD PRESSURE: 70 MMHG | SYSTOLIC BLOOD PRESSURE: 122 MMHG

## 2022-07-11 DIAGNOSIS — J30.1 SEASONAL ALLERGIC RHINITIS DUE TO POLLEN: ICD-10-CM

## 2022-07-11 DIAGNOSIS — Z00.00 ROUTINE GENERAL MEDICAL EXAMINATION AT A HEALTH CARE FACILITY: Primary | ICD-10-CM

## 2022-07-11 DIAGNOSIS — R51.9 NONINTRACTABLE EPISODIC HEADACHE, UNSPECIFIED HEADACHE TYPE: ICD-10-CM

## 2022-07-11 PROCEDURE — 99395 PREV VISIT EST AGE 18-39: CPT | Performed by: PHYSICIAN ASSISTANT

## 2022-07-11 RX ORDER — MONTELUKAST SODIUM 10 MG/1
10 TABLET ORAL AT BEDTIME
Qty: 90 TABLET | Refills: 3 | Status: SHIPPED | OUTPATIENT
Start: 2022-07-11 | End: 2023-05-01

## 2022-07-11 ASSESSMENT — ENCOUNTER SYMPTOMS
PARESTHESIAS: 0
FEVER: 0
EYE PAIN: 0
ARTHRALGIAS: 0
MYALGIAS: 0
HEARTBURN: 0
CONSTIPATION: 0
SORE THROAT: 0
HEADACHES: 0
SHORTNESS OF BREATH: 0
DIARRHEA: 0
FREQUENCY: 0
JOINT SWELLING: 0
HEMATOCHEZIA: 0
DYSURIA: 0
WEAKNESS: 0
HEMATURIA: 0
ABDOMINAL PAIN: 0
NAUSEA: 0
PALPITATIONS: 0
COUGH: 0
BREAST MASS: 0
DIZZINESS: 0
NERVOUS/ANXIOUS: 0
CHILLS: 0

## 2022-07-11 NOTE — PROGRESS NOTES
SUBJECTIVE:   CC: Shannon Olivarez is an 36 year old woman who presents for preventive health visit.     Patient has been advised of split billing requirements and indicates understanding: Yes  Healthy Habits:     Getting at least 3 servings of Calcium per day:  NO    Bi-annual eye exam:  NO    Dental care twice a year:  Yes    Sleep apnea or symptoms of sleep apnea:  None    Diet:  Regular (no restrictions)    Frequency of exercise:  1 day/week    Duration of exercise:  Less than 15 minutes    Taking medications regularly:  Yes    Medication side effects:  None    PHQ-2 Total Score: 0    Additional concerns today:  Yes    Singulair   - Needs refilled      Migraines   - Rare           Today's PHQ-2 Score:   PHQ-2 ( 1999 Pf[employer]) 7/11/2022   Q1: Little interest or pleasure in doing things 0   Q2: Feeling down, depressed or hopeless 0   PHQ-2 Score 0   PHQ-2 Total Score (12-17 Years)- Positive if 3 or more points; Administer PHQ-A if positive -   Q1: Little interest or pleasure in doing things Not at all   Q2: Feeling down, depressed or hopeless Not at all   PHQ-2 Score 0       Abuse: Current or Past (Physical, Sexual or Emotional) - No  Do you feel safe in your environment? Yes    Have you ever done Advance Care Planning? (For example, a Health Directive, POLST, or a discussion with a medical provider or your loved ones about your wishes): No, advance care planning information given to patient to review.  Patient declined advance care planning discussion at this time.    Social History     Tobacco Use     Smoking status: Never Smoker     Smokeless tobacco: Never Used   Substance Use Topics     Alcohol use: Yes     If you drink alcohol do you typically have >3 drinks per day or >7 drinks per week? No    Alcohol Use 7/11/2022   Prescreen: >3 drinks/day or >7 drinks/week? No   Prescreen: >3 drinks/day or >7 drinks/week? -       Reviewed orders with patient.  Reviewed health maintenance and updated orders accordingly -  Yes  Lab work is in process  Labs reviewed in EPIC  BP Readings from Last 3 Encounters:   07/11/22 122/70   01/03/22 120/72   12/01/21 110/80    Wt Readings from Last 3 Encounters:   07/11/22 70.5 kg (155 lb 8 oz)   01/03/22 77.1 kg (170 lb)   12/01/21 71.4 kg (157 lb 8 oz)               Breast Cancer Screening:    FHS-7:   Breast CA Risk Assessment (FHS-7) 7/11/2022   Did any of your first-degree relatives have breast or ovarian cancer? No   Did any of your relatives have bilateral breast cancer? Yes   Did any man in your family have breast cancer? No   Did any woman in your family have breast and ovarian cancer? Yes   Did any woman in your family have breast cancer before age 50 y? Yes   Do you have 2 or more relatives with breast and/or ovarian cancer? No   Do you have 2 or more relatives with breast and/or bowel cancer? No       Patient under 40 years of age: Routine Mammogram Screening not recommended.   Pertinent mammograms are reviewed under the imaging tab.    History of abnormal Pap smear: NO - age 30-65 PAP every 5 years with negative HPV co-testing recommended  PAP / HPV Latest Ref Rng & Units 1/3/2022 9/28/2017 12/15/2014   PAP   Negative for Intraepithelial Lesion or Malignancy (NILM) - -   PAP (Historical) - - NIL NIL   HPV16 Negative Negative Negative -   HPV18 Negative Negative Negative -   HRHPV Negative Negative Negative -     Reviewed and updated as needed this visit by clinical staff   Tobacco  Allergies  Meds  Problems  Med Hx  Surg Hx  Fam Hx  Soc   Hx          Reviewed and updated as needed this visit by Provider   Tobacco  Allergies  Meds  Problems  Med Hx  Surg Hx  Fam Hx           Past Medical History:   Diagnosis Date     Acne clear    sees derm Dr. Raygoza     Allergic rhinitis, cause unspecified     dr. joel GIBSON (allergic rhinitis)      Asthma 1997     High cholesterol      Hypertension 10/09/2014    High blood pressure again noted at Urgent Care 12/13/14     Melanoma  "of scalp or neck (H)      Motion sickness 04/30/2013     Spina bifida (H) 2006    seen on xray when in college      Past Surgical History:   Procedure Laterality Date     NO HISTORY OF SURGERY         Review of Systems   Constitutional: Negative for chills and fever.   HENT: Negative for congestion, ear pain, hearing loss and sore throat.    Eyes: Negative for pain and visual disturbance.   Respiratory: Negative for cough and shortness of breath.    Cardiovascular: Negative for chest pain, palpitations and peripheral edema.   Gastrointestinal: Negative for abdominal pain, constipation, diarrhea, heartburn, hematochezia and nausea.   Breasts:  Negative for tenderness, breast mass and discharge.   Genitourinary: Positive for pelvic pain. Negative for dysuria, frequency, genital sores, hematuria, urgency, vaginal bleeding and vaginal discharge.   Musculoskeletal: Negative for arthralgias, joint swelling and myalgias.   Skin: Negative for rash.   Neurological: Negative for dizziness, weakness, headaches and paresthesias.   Psychiatric/Behavioral: Negative for mood changes. The patient is not nervous/anxious.         OBJECTIVE:   /70   Pulse 90   Temp 97.2  F (36.2  C) (Temporal)   Resp 24   Ht 1.651 m (5' 5\")   Wt 70.5 kg (155 lb 8 oz)   LMP  (LMP Unknown)   SpO2 96%   Breastfeeding Yes   BMI 25.88 kg/m    Physical Exam  Constitutional:       General: She is not in acute distress.     Appearance: She is well-developed.   HENT:      Right Ear: External ear normal.      Left Ear: External ear normal.      Nose: Nose normal.      Mouth/Throat:      Pharynx: No oropharyngeal exudate.   Eyes:      General:         Right eye: No discharge.         Left eye: No discharge.      Conjunctiva/sclera: Conjunctivae normal.      Pupils: Pupils are equal, round, and reactive to light.   Neck:      Thyroid: No thyromegaly.      Vascular: No JVD.      Trachea: No tracheal deviation.   Cardiovascular:      Rate and Rhythm: " "Normal rate and regular rhythm.      Heart sounds: Normal heart sounds. No murmur heard.    No friction rub. No gallop.   Pulmonary:      Effort: Pulmonary effort is normal. No respiratory distress.      Breath sounds: Normal breath sounds. No stridor. No wheezing or rales.   Abdominal:      General: Bowel sounds are normal. There is no distension.      Palpations: Abdomen is soft. There is no mass.      Tenderness: There is no abdominal tenderness. There is no guarding or rebound.      Hernia: No hernia is present.   Musculoskeletal:         General: Normal range of motion.      Cervical back: Normal range of motion and neck supple.   Lymphadenopathy:      Cervical: No cervical adenopathy.   Skin:     General: Skin is warm and dry.   Neurological:      Mental Status: She is alert and oriented to person, place, and time.   Psychiatric:         Behavior: Behavior normal.         Thought Content: Thought content normal.         Judgment: Judgment normal.     Breast exam declined as is breast feeding   - Pelvic not indicated       Diagnostic Test Results:  Labs reviewed in Epic    ASSESSMENT/PLAN:       ICD-10-CM    1. Routine general medical examination at a health care facility  Z00.00    2. Seasonal allergic rhinitis due to pollen  J30.1 montelukast (SINGULAIR) 10 MG tablet   3. Nonintractable episodic headache, unspecified headache type  R51.9      - Reviewed medication use and side effects, refilled   - OK for refill of Imitrex if needs       Patient has been advised of split billing requirements and indicates understanding: Yes    COUNSELING:  Reviewed preventive health counseling, as reflected in patient instructions  Special attention given to:        Regular exercise       Healthy diet/nutrition       Advance Care Planning    Estimated body mass index is 28.26 kg/m  as calculated from the following:    Height as of 3/11/21: 1.652 m (5' 5.04\").    Weight as of 1/3/22: 77.1 kg (170 lb).    Weight management " plan: Discussed healthy diet and exercise guidelines    She reports that she has never smoked. She has never used smokeless tobacco.      Counseling Resources:  ATP IV Guidelines  Pooled Cohorts Equation Calculator  Breast Cancer Risk Calculator  BRCA-Related Cancer Risk Assessment: FHS-7 Tool  FRAX Risk Assessment  ICSI Preventive Guidelines  Dietary Guidelines for Americans, 2010  USDA's MyPlate  ASA Prophylaxis  Lung CA Screening    Review of the result(s) of each unique test - See list       1/3/22 - Pap   Diagnosis or treatment significantly limited by social determinants of health - None     30 minutes spent on the date of the encounter doing chart review, history and exam, documentation and further activities as noted above    The patient indicates understanding of these issues and agrees with the plan.      MARY Jackson [facility]

## 2022-09-12 ENCOUNTER — OFFICE VISIT (OUTPATIENT)
Dept: FAMILY MEDICINE | Facility: OTHER | Age: 37
End: 2022-09-12
Payer: COMMERCIAL

## 2022-09-12 VITALS
OXYGEN SATURATION: 97 % | BODY MASS INDEX: 26.24 KG/M2 | WEIGHT: 157.5 LBS | SYSTOLIC BLOOD PRESSURE: 110 MMHG | HEART RATE: 92 BPM | DIASTOLIC BLOOD PRESSURE: 60 MMHG | RESPIRATION RATE: 22 BRPM | HEIGHT: 65 IN | TEMPERATURE: 97.2 F

## 2022-09-12 DIAGNOSIS — H92.02 OTALGIA, LEFT: Primary | ICD-10-CM

## 2022-09-12 DIAGNOSIS — J30.1 SEASONAL ALLERGIC RHINITIS DUE TO POLLEN: ICD-10-CM

## 2022-09-12 PROCEDURE — 99213 OFFICE O/P EST LOW 20 MIN: CPT | Performed by: PHYSICIAN ASSISTANT

## 2022-09-12 ASSESSMENT — ASTHMA QUESTIONNAIRES
QUESTION_5 LAST FOUR WEEKS HOW WOULD YOU RATE YOUR ASTHMA CONTROL: COMPLETELY CONTROLLED
ACT_TOTALSCORE: 25
QUESTION_1 LAST FOUR WEEKS HOW MUCH OF THE TIME DID YOUR ASTHMA KEEP YOU FROM GETTING AS MUCH DONE AT WORK, SCHOOL OR AT HOME: NONE OF THE TIME
QUESTION_4 LAST FOUR WEEKS HOW OFTEN HAVE YOU USED YOUR RESCUE INHALER OR NEBULIZER MEDICATION (SUCH AS ALBUTEROL): NOT AT ALL
QUESTION_3 LAST FOUR WEEKS HOW OFTEN DID YOUR ASTHMA SYMPTOMS (WHEEZING, COUGHING, SHORTNESS OF BREATH, CHEST TIGHTNESS OR PAIN) WAKE YOU UP AT NIGHT OR EARLIER THAN USUAL IN THE MORNING: NOT AT ALL
ACT_TOTALSCORE: 25
QUESTION_2 LAST FOUR WEEKS HOW OFTEN HAVE YOU HAD SHORTNESS OF BREATH: NOT AT ALL

## 2022-09-12 ASSESSMENT — PAIN SCALES - GENERAL: PAINLEVEL: SEVERE PAIN (6)

## 2022-09-12 NOTE — PROGRESS NOTES
Assessment & Plan     ICD-10-CM    1. Otalgia, left  H92.02    2. Seasonal allergic rhinitis due to pollen  J30.1        - Patient presents with 7 days of left ear pain. Noticed after sore throat last week. Does have a hx of allergies.     - Bilateral ear canals without erythema or cerumen. TMs pearly grey with no effusion, injection, or bulging.    - Reassurance given. Discussed likelihood of it being allergies. Can take Claritin, Zyrtec, allegra or Sudafed to help drainage. Patient states that pain is getting better. Patient amenable to plan.     Review of the result(s) of each unique test - None   Diagnosis or treatment significantly limited by social determinants of health - None     Return in about 1 week (around 9/19/2022) for if not improving.    15 minutes spent on the date of the encounter doing chart review, history and exam, documentation and further activities as noted above    The patient indicates understanding of these issues and agrees with the plan.     I, Javier Kc PA-C,  was present with the PA student who participated in the service and in the documentation of the note.  I have verified the history and personally performed the physical exam and medical decision making.  I agree with the assessment and plan of care as documented in the note.      CHLOE Fleming-S2  [facility] [place]      VANITA BakerC  [facility] [place]     Kurt Rush is a 36 year old, presenting for the following health issues:  Otitis Media      History of Present Illness       Reason for visit:  Ear ache  Symptom onset:  1-2 weeks ago  Symptoms include:  Ear ache, congestion  Symptom intensity:  Moderate  Symptom progression:  Staying the same  Had these symptoms before:  Yes  Has tried/received treatment for these symptoms:  No  What makes it better:  Ibuprofen    She eats 2-3 servings of fruits and vegetables daily.She consumes 1 sweetened beverage(s) daily.She  "exercises with enough effort to increase her heart rate 10 to 19 minutes per day.  She exercises with enough effort to increase her heart rate 3 or less days per week.   She is taking medications regularly.     - Left ear pain for last 7 days  - Started to notice after a sore throat  - Denies difficulties hearing. Endorses congestion.   - Getting better, has to take ibuprofen in the evening.      Review of Systems   Constitutional, HEENT, cardiovascular, pulmonary, gi and gu systems are negative, except as otherwise noted.      Objective    /60   Pulse 92   Temp 97.2  F (36.2  C) (Temporal)   Resp 22   Ht 1.66 m (5' 5.35\")   Wt 71.4 kg (157 lb 8 oz)   SpO2 97%   BMI 25.93 kg/m    Body mass index is 25.93 kg/m .  Physical Exam   GENERAL APPEARANCE: healthy appearing, alert and no distress  EYES: Eyes grossly normal to inspection, PERRLA, conjunctivae and sclerae without injection or discharge, EOM intact   HENT: Bilateral ear canals without erythema and minimal cerumen, bilateral TM's pearly grey with normal light reflex, no effusion, injection, or bulging, nasal turbinates without swelling, erythema, or discharge, mouth without ulcers or lesions, oropharynx clear and oral mucous membranes moist, no sinus tenderness   NECK: No adenopathy in cervical or supraclavicular regions  RESP: Lungs clear to auscultation - no rales, rhonchi or wheezes   CV: Regular rates and rhythm, normal S1 S2, no S3 or S4, no murmur, click or rub  MS: No musculoskeletal defects are noted and gait is age appropriate without ataxia   SKIN: No suspicious lesions or rashes, hydration status appears adeuqate with normal skin turgor   PSYCH: Alert and oriented x3; speech- coherent , normal rate and volume; able to articulate logical thoughts, able to abstract reason, no tangential thoughts, no hallucinations or delusions, mentation appears normal, Mood is euthymic. Affect is appropriate for this mood state and bright. Thought content " is free of suicidal ideation, hallucinations, and delusions. Dress is adequate and upkept. Eye contact is good during conversation.       Diagnostics: none

## 2023-01-08 ENCOUNTER — HEALTH MAINTENANCE LETTER (OUTPATIENT)
Age: 38
End: 2023-01-08

## 2023-01-10 ENCOUNTER — E-VISIT (OUTPATIENT)
Dept: FAMILY MEDICINE | Facility: OTHER | Age: 38
End: 2023-01-10
Payer: COMMERCIAL

## 2023-01-10 DIAGNOSIS — R30.0 DYSURIA: Primary | ICD-10-CM

## 2023-01-10 PROCEDURE — 99421 OL DIG E/M SVC 5-10 MIN: CPT | Performed by: PHYSICIAN ASSISTANT

## 2023-01-11 ENCOUNTER — LAB (OUTPATIENT)
Dept: LAB | Facility: CLINIC | Age: 38
End: 2023-01-11
Payer: COMMERCIAL

## 2023-01-11 DIAGNOSIS — R30.0 DYSURIA: ICD-10-CM

## 2023-01-11 LAB
ALBUMIN UR-MCNC: NEGATIVE MG/DL
APPEARANCE UR: CLEAR
BILIRUB UR QL STRIP: NEGATIVE
CLUE CELLS: NORMAL
COLOR UR AUTO: YELLOW
GLUCOSE UR STRIP-MCNC: NEGATIVE MG/DL
HGB UR QL STRIP: ABNORMAL
KETONES UR STRIP-MCNC: NEGATIVE MG/DL
LEUKOCYTE ESTERASE UR QL STRIP: NEGATIVE
NITRATE UR QL: NEGATIVE
PH UR STRIP: 5 [PH] (ref 5–8)
RBC #/AREA URNS AUTO: ABNORMAL /HPF
SP GR UR STRIP: 1.01 (ref 1–1.03)
SQUAMOUS #/AREA URNS AUTO: ABNORMAL /LPF
TRICHOMONAS, WET PREP: NORMAL
UROBILINOGEN UR STRIP-ACNC: 0.2 E.U./DL
WBC #/AREA URNS AUTO: ABNORMAL /HPF
WBC'S/HIGH POWER FIELD, WET PREP: NORMAL
YEAST, WET PREP: NORMAL

## 2023-01-11 PROCEDURE — 81001 URINALYSIS AUTO W/SCOPE: CPT

## 2023-01-11 PROCEDURE — 87210 SMEAR WET MOUNT SALINE/INK: CPT

## 2023-01-11 NOTE — PATIENT INSTRUCTIONS
Dear Shannon Olivarez    I have found no cause for your symptoms. Please monitor and seek care if symptoms worsen.     Thanks for choosing us as your health care partner,    Naila Kc PA-C

## 2023-01-11 NOTE — TELEPHONE ENCOUNTER
Provider E-Visit time total (minutes): 8    Javier Kc PA-C  [facility]

## 2023-04-03 ENCOUNTER — OFFICE VISIT (OUTPATIENT)
Dept: FAMILY MEDICINE | Facility: OTHER | Age: 38
End: 2023-04-03
Payer: COMMERCIAL

## 2023-04-03 VITALS
HEART RATE: 110 BPM | WEIGHT: 147 LBS | HEIGHT: 65 IN | SYSTOLIC BLOOD PRESSURE: 110 MMHG | RESPIRATION RATE: 20 BRPM | TEMPERATURE: 99.8 F | OXYGEN SATURATION: 97 % | DIASTOLIC BLOOD PRESSURE: 66 MMHG | BODY MASS INDEX: 24.49 KG/M2

## 2023-04-03 DIAGNOSIS — J02.9 SORE THROAT: Primary | ICD-10-CM

## 2023-04-03 DIAGNOSIS — G44.209 TENSION HEADACHE: ICD-10-CM

## 2023-04-03 DIAGNOSIS — Z20.818 STREP THROAT EXPOSURE: ICD-10-CM

## 2023-04-03 LAB — DEPRECATED S PYO AG THROAT QL EIA: NEGATIVE

## 2023-04-03 PROCEDURE — 87651 STREP A DNA AMP PROBE: CPT | Performed by: PHYSICIAN ASSISTANT

## 2023-04-03 PROCEDURE — 99213 OFFICE O/P EST LOW 20 MIN: CPT | Performed by: PHYSICIAN ASSISTANT

## 2023-04-03 RX ORDER — AZITHROMYCIN 250 MG/1
TABLET, FILM COATED ORAL
Qty: 6 TABLET | Refills: 0 | Status: SHIPPED | OUTPATIENT
Start: 2023-04-03 | End: 2023-05-01

## 2023-04-03 ASSESSMENT — PAIN SCALES - GENERAL: PAINLEVEL: MILD PAIN (3)

## 2023-04-03 ASSESSMENT — ENCOUNTER SYMPTOMS: SORE THROAT: 1

## 2023-04-03 NOTE — PROGRESS NOTES
"  Assessment & Plan     Sore throat  Tension headache  Strep throat exposure  First test negative.  Signs and symptoms and exposure to her 18-month-old daughter with strep throat are noted.  Prescription sent.  Follow-up as needed.  - Streptococcus A Rapid Screen w/Reflex to PCR - Clinic Collect  - azithromycin (ZITHROMAX) 250 MG tablet; Two tablets first day, then one tablet daily for four days  - Group A Streptococcus PCR Throat Swab        Aldo Jefferson PA-C  [facility] RASHAWN Rush is a 37 year old, presenting for the following health issues:  Pharyngitis        4/3/2023     4:19 PM   Additional Questions   Roomed by Gabriella     Pharyngitis     History of Present Illness       Reason for visit:  Strep symptoms  Symptom onset:  1-3 days ago  Symptoms include:  Sore throat, fever, cough, headache  Symptom intensity:  Moderate  Symptom progression:  Worsening  Had these symptoms before:  Yes  Has tried/received treatment for these symptoms:  No  What makes it better:  Tylenol or ibuprofen    She eats 2-3 servings of fruits and vegetables daily.She consumes 1 sweetened beverage(s) daily.She exercises with enough effort to increase her heart rate 9 or less minutes per day.  She exercises with enough effort to increase her heart rate 3 or less days per week.   She is taking medications regularly.     Review of Systems   HENT: Positive for sore throat.       Constitutional, HEENT, cardiovascular, pulmonary, GI, , musculoskeletal, neuro, skin, endocrine and psych systems are negative, except as otherwise noted.      Objective    /66   Pulse 110   Temp 99.8  F (37.7  C) (Temporal)   Resp 20   Ht 1.66 m (5' 5.35\")   Wt 66.7 kg (147 lb)   [facility] 03/23/2023   SpO2 97%   Breastfeeding Yes   BMI 24.20 kg/m    Body mass index is 24.2 kg/m .  Physical Exam   GENERAL: healthy, alert and no distress  EYES: Eyes grossly normal to inspection, PERRL and conjunctivae and sclerae " normal  HENT: normal cephalic/atraumatic, ear canals and TM's normal, nose and mouth without ulcers or lesions, oropharynx clear, oral mucous membranes moist and tonsillar erythema  NECK: no adenopathy, no asymmetry, masses, or scars and trachea midline and normal to palpation  RESP: lungs clear to auscultation - no rales, rhonchi or wheezes  CV: regular rate and rhythm, normal S1 S2, no S3 or S4, no murmur, click or rub, no peripheral edema and peripheral pulses strong  ABDOMEN: soft, nontender, no hepatosplenomegaly, no masses and bowel sounds normal  MS: no gross musculoskeletal defects noted, no edema  SKIN: no suspicious lesions or rashes  NEURO: Normal strength and tone, mentation intact and speech normal  PSYCH: mentation appears normal, affect normal/bright    Results for orders placed or performed in visit on 04/03/23 (from the past 24 hour(s))   Streptococcus A Rapid Screen w/Reflex to PCR - Clinic Collect    Specimen: Throat; Swab   Result Value Ref Range    Group A Strep antigen Negative Negative

## 2023-04-04 LAB — GROUP A STREP BY PCR: NOT DETECTED

## 2023-04-27 ENCOUNTER — MYC MEDICAL ADVICE (OUTPATIENT)
Dept: FAMILY MEDICINE | Facility: OTHER | Age: 38
End: 2023-04-27
Payer: COMMERCIAL

## 2023-05-01 ENCOUNTER — VIRTUAL VISIT (OUTPATIENT)
Dept: FAMILY MEDICINE | Facility: CLINIC | Age: 38
End: 2023-05-01
Payer: COMMERCIAL

## 2023-05-01 DIAGNOSIS — J30.1 SEASONAL ALLERGIC RHINITIS DUE TO POLLEN: ICD-10-CM

## 2023-05-01 DIAGNOSIS — G43.909 MIGRAINE WITHOUT STATUS MIGRAINOSUS, NOT INTRACTABLE, UNSPECIFIED MIGRAINE TYPE: Primary | ICD-10-CM

## 2023-05-01 DIAGNOSIS — Z30.41 ENCOUNTER FOR SURVEILLANCE OF CONTRACEPTIVE PILLS: ICD-10-CM

## 2023-05-01 PROCEDURE — 99214 OFFICE O/P EST MOD 30 MIN: CPT | Mod: VID | Performed by: FAMILY MEDICINE

## 2023-05-01 RX ORDER — LORATADINE 10 MG/1
10 TABLET ORAL DAILY
COMMUNITY
Start: 2023-05-01 | End: 2023-08-15

## 2023-05-01 RX ORDER — SUMATRIPTAN 100 MG/1
TABLET, FILM COATED ORAL
Qty: 9 TABLET | Refills: 1 | Status: SHIPPED | OUTPATIENT
Start: 2023-05-01 | End: 2023-09-18

## 2023-05-01 RX ORDER — NORGESTIMATE AND ETHINYL ESTRADIOL 7DAYSX3 LO
1 KIT ORAL DAILY
Qty: 84 TABLET | Refills: 0 | Status: SHIPPED | OUTPATIENT
Start: 2023-05-01 | End: 2023-08-15

## 2023-05-01 RX ORDER — SUMATRIPTAN 50 MG/1
100 TABLET, FILM COATED ORAL
Qty: 10 TABLET | Refills: 0 | Status: CANCELLED | OUTPATIENT
Start: 2023-05-01

## 2023-05-01 RX ORDER — MONTELUKAST SODIUM 10 MG/1
10 TABLET ORAL AT BEDTIME
Qty: 90 TABLET | Refills: 1 | Status: SHIPPED | OUTPATIENT
Start: 2023-05-01 | End: 2023-09-18

## 2023-05-01 NOTE — PROGRESS NOTES
Adri is a 37 year old who is being evaluated via a billable video visit.      How would you like to obtain your AVS? Rafalharmonico  If the video visit is dropped, the invitation should be resent by: Text to cell phone: [phone] ericmonico  Will anyone else be joining your video visit? No        Assessment & Plan     (G43.909) Migraine without status migrainosus, not intractable, unspecified migraine type  (primary encounter diagnosis)  Comment: mostly menstrual migraine   Plan: SUMAtriptan (IMITREX) 100 MG tablet            (J30.1) Seasonal allergic rhinitis due to pollen  Comment:stable   Plan: montelukast (SINGULAIR) 10 MG tablet,         loratadine (CLARITIN) 10 MG tablet            (Z30.41) Encounter for surveillance of contraceptive pills  Comment: wants to go back on   Plan: norgestim-eth estrad triphasic (ORTHO         TRI-CYCLEN LO) 0.18/0.215/0.25 MG-25 MCG tablet             refill sent.Cares and  treatment discussed.  Follow   Up with PCP  if problem   Patient expressed understanding and agreement with treatment plan. All patient's questions were answered, will let me know if has more later.  Medications: Rx's: Reviewed the potential side effects/complications of medications prescribed.       Donna Santana MD  [facility]   Adri is a 37 year old, presenting for the following health issues:  Medication Refill        5/1/2023     9:06 AM   Additional Questions   Roomed by be     Medication Refill    History of Present Illness       Reason for visit:  Medication refill    She eats 2-3 servings of fruits and vegetables daily.She consumes 1 sweetened beverage(s) daily.        Allergy  Follow-Up   Doing well on current dose of Singulair . Works well for allergy no asthma issue any more she was o q tabby a while ago, but none for few yrs. She thinsk she has really has no issue with any breathing now and has not really been given any inhaler in along time no problem  from any medication         Migraine      Since your last clinic visit, how have your headaches changed?  No change    How often are you getting headaches or migraines? Mostly during menstrual Cycle may last for 1-2 hunter sometimes      Are you able to do normal daily activities when you have a migraine? No    Are you taking rescue/relief medications? (Select all that apply) sumatriptan (Imitrex)    How helpful is your rescue/relief medication?  I get total relief sometimes she would take 50 mg but often she ends needing 100 mg dose , so she prefers 100 mg tab . No problem taking med's and need refill     Are you taking any medications to prevent migraines? (Select all that apply)  No    In the past 4 weeks, how often have you gone to urgent care or the emergency room because of your headaches?  0      Oral contraception     Also wants to go back on BCP she has 15 month old child now . It has worked well for her in the past and needs refill  She is due for physical in few months  so plans to schedule follow up with her pcp       Review of Systems   Constitutional, HEENT, cardiovascular, pulmonary, GI, , musculoskeletal, neuro, skin, endocrine and psych systems are negative, except as otherwise noted.      Objective           Vitals:  No vitals were obtained today due to virtual visit.    Physical Exam   GENERAL: Healthy, alert and no distress  EYES: Eyes grossly normal to inspection.  No discharge or erythema, or obvious scleral/conjunctival abnormalities.  RESP: No audible wheeze, cough, or visible cyanosis.  No visible retractions or increased work of breathing.    SKIN: Visible skin clear. No significant rash, abnormal pigmentation or lesions.  NEURO: Cranial nerves grossly intact.  Mentation and speech appropriate for age.  PSYCH: Mentation appears normal, affect normal/bright, judgement and insight intact, normal speech and appearance well-groomed.            Video-Visit Details    Type of service:  Video Visit      Originating Location (pt. Location): Home    Distant Location (provider location):  Off-site  Platform used for Video Visit: Destiny

## 2023-06-19 ENCOUNTER — VIRTUAL VISIT (OUTPATIENT)
Dept: FAMILY MEDICINE | Facility: OTHER | Age: 38
End: 2023-06-19
Payer: COMMERCIAL

## 2023-06-19 ENCOUNTER — APPOINTMENT (OUTPATIENT)
Dept: LAB | Facility: OTHER | Age: 38
End: 2023-06-19
Payer: COMMERCIAL

## 2023-06-19 DIAGNOSIS — J02.9 SORE THROAT: Primary | ICD-10-CM

## 2023-06-19 LAB
DEPRECATED S PYO AG THROAT QL EIA: NEGATIVE
GROUP A STREP BY PCR: NOT DETECTED

## 2023-06-19 PROCEDURE — 87651 STREP A DNA AMP PROBE: CPT | Performed by: STUDENT IN AN ORGANIZED HEALTH CARE EDUCATION/TRAINING PROGRAM

## 2023-06-19 PROCEDURE — 99213 OFFICE O/P EST LOW 20 MIN: CPT | Mod: 93 | Performed by: STUDENT IN AN ORGANIZED HEALTH CARE EDUCATION/TRAINING PROGRAM

## 2023-06-19 NOTE — PROGRESS NOTES
Adri is a 37 year old who is being evaluated via a billable telephone visit.      What phone number would you like to be contacted at? [phone]  How would you like to obtain your AVS? MyChart    Distant Location (provider location):  Off-site    Assessment & Plan     Sore throat  We will plan for strep testing, likely strep pharyngitis.  Is allergic to amoxicillin and Ceclor.  Appears to have had issues as a child with rash and possible throat swelling?  Recent Z-Jose for strep pharyngitis earlier this year.  Will treat accordingly pending the results.  - Streptococcus A Rapid Screen w/Reflex to PCR - Clinic Collect      HERB DE LA CRUZ MD  [facility]   Adri is a 37 year old, presenting for the following health issues:  Strep concern        5/1/2023     9:06 AM   Additional Questions   Roomed by be HORN     Sore throat for 2 days, slight congestion. NO cough. No other signs/symptoms to mention of.       Review of Systems   Constitutional, HEENT, cardiovascular, pulmonary, gi and gu systems are negative, except as otherwise noted.      Objective           Vitals:  No vitals were obtained today due to virtual visit.    Physical Exam   healthy, alert and no distress  PSYCH: Alert and oriented times 3; coherent speech, normal   rate and volume, able to articulate logical thoughts, able   to abstract reason, no tangential thoughts, no hallucinations   or delusions  Her affect is normal  RESP: No cough, no audible wheezing, able to talk in full sentences  Remainder of exam unable to be completed due to telephone visits        Phone call duration: 7 minutes

## 2023-06-20 NOTE — RESULT ENCOUNTER NOTE
Shannon,    Your results are within normal limits/negative and nothing else needs to be done at this time.       If you have any questions feel free to call the clinic at [phone].      Thank you,    Ion Glover MD

## 2023-08-15 ENCOUNTER — OFFICE VISIT (OUTPATIENT)
Dept: URGENT CARE | Facility: URGENT CARE | Age: 38
End: 2023-08-15
Payer: COMMERCIAL

## 2023-08-15 VITALS
RESPIRATION RATE: 13 BRPM | OXYGEN SATURATION: 99 % | TEMPERATURE: 97.8 F | HEART RATE: 108 BPM | WEIGHT: 152 LBS | SYSTOLIC BLOOD PRESSURE: 119 MMHG | BODY MASS INDEX: 25.02 KG/M2 | DIASTOLIC BLOOD PRESSURE: 81 MMHG

## 2023-08-15 DIAGNOSIS — N61.0 MASTITIS: Primary | ICD-10-CM

## 2023-08-15 PROCEDURE — 99213 OFFICE O/P EST LOW 20 MIN: CPT | Performed by: STUDENT IN AN ORGANIZED HEALTH CARE EDUCATION/TRAINING PROGRAM

## 2023-08-15 RX ORDER — FEXOFENADINE HCL 180 MG/1
180 TABLET ORAL DAILY
COMMUNITY

## 2023-08-15 RX ORDER — SULFAMETHOXAZOLE/TRIMETHOPRIM 800-160 MG
1 TABLET ORAL 2 TIMES DAILY
Qty: 20 TABLET | Refills: 0 | Status: SHIPPED | OUTPATIENT
Start: 2023-08-15 | End: 2023-08-25

## 2023-08-15 NOTE — PROGRESS NOTES
Assessment & Plan     Mastitis  37-year-old woman who was currently breast-feeding who presents with less than 24 hours of right breast pain due to lactational mastitis.  Vitals are notable for pulse 108, otherwise within normal limits.  On exam, the patient is nontoxic-appearing, well-hydrated and perfused, with tenderness to palpation and faint erythema of the upper inner quadrant of the right breast, no mass, no nipple discharge and no palpable axillary masses or adenopathy.  Plan: Bactrim X 10 days (given history of amoxicillin and erythromycin allergy), continue emptying the breast every 2-3 hours, fluids, Tylenol/ibuprofen as needed.  Discussed return precautions.  The patient's questions were addressed and she verbalized understanding.  - sulfamethoxazole-trimethoprim (BACTRIM DS) 800-160 MG tablet  Dispense: 20 tablet; Refill: 0             No follow-ups on file.    Gabi Longoria MD  [facility]    Kurt Rush is a 37 year old female who presents to clinic today for the following health issues:  Chief Complaint   Patient presents with    Urgent Care     Present for right breast pain since last night- possible mastitis.      HPI    Breast pain    Onset  was 1 day(s) ago.   Current and Associated symptoms: pain, redness  Location: right breast.  Previous history of mastitis: yes  Denies exposure to: fever  Treatment measures tried include: warm compresses, expressing breast  The patient is breastfeeding her 2 year old twice daily  No history of MRSA    Review of Systems  Constitutional, HEENT, cardiovascular, pulmonary, gi and gu systems are negative, except as otherwise noted.      Objective    /81 (BP Location: Right arm, Patient Position: Sitting, Cuff Size: Adult Regular)   Pulse 108   Temp 97.8  F (36.6  C) (Tympanic)   Resp 13   Wt 68.9 kg (152 lb)   SpO2 99%   BMI 25.02 kg/m    Physical Exam   GENERAL: healthy, alert and no distress, nontoxic  EYES: Eyes  grossly normal to inspection, and conjunctivae and sclerae normal  RESP: lungs clear to auscultation - no rales, rhonchi or wheezes  BREAST: Tenderness to palpation and faint erythema of the upper inner quadrant of the right breast, no mass, no nipple discharge and no palpable axillary masses or adenopathy  CV: regular rate and rhythm, normal S1 S2, no S3 or S4, no murmur, normal cap refill, and peripheral pulses strong  ABDOMEN: soft, nontender  MS: no gross musculoskeletal defects noted, no edema  SKIN: no suspicious lesions or rashes  NEURO: Normal nonfocal exam    No results found for this or any previous visit (from the past 24 hour(s)).

## 2023-09-18 ENCOUNTER — OFFICE VISIT (OUTPATIENT)
Dept: FAMILY MEDICINE | Facility: OTHER | Age: 38
End: 2023-09-18
Payer: COMMERCIAL

## 2023-09-18 VITALS
TEMPERATURE: 97.7 F | OXYGEN SATURATION: 97 % | BODY MASS INDEX: 25.16 KG/M2 | DIASTOLIC BLOOD PRESSURE: 74 MMHG | RESPIRATION RATE: 20 BRPM | HEART RATE: 70 BPM | HEIGHT: 65 IN | SYSTOLIC BLOOD PRESSURE: 104 MMHG | WEIGHT: 151 LBS

## 2023-09-18 DIAGNOSIS — Z13.220 SCREENING FOR LIPID DISORDERS: ICD-10-CM

## 2023-09-18 DIAGNOSIS — G43.909 MIGRAINE WITHOUT STATUS MIGRAINOSUS, NOT INTRACTABLE, UNSPECIFIED MIGRAINE TYPE: ICD-10-CM

## 2023-09-18 DIAGNOSIS — J30.1 SEASONAL ALLERGIC RHINITIS DUE TO POLLEN: ICD-10-CM

## 2023-09-18 DIAGNOSIS — Z00.00 ROUTINE GENERAL MEDICAL EXAMINATION AT A HEALTH CARE FACILITY: Primary | ICD-10-CM

## 2023-09-18 LAB
CHOLEST SERPL-MCNC: 181 MG/DL
HDLC SERPL-MCNC: 69 MG/DL
LDLC SERPL CALC-MCNC: 101 MG/DL
NONHDLC SERPL-MCNC: 112 MG/DL
TRIGL SERPL-MCNC: 54 MG/DL

## 2023-09-18 PROCEDURE — 36415 COLL VENOUS BLD VENIPUNCTURE: CPT | Performed by: PHYSICIAN ASSISTANT

## 2023-09-18 PROCEDURE — 99395 PREV VISIT EST AGE 18-39: CPT | Mod: 25 | Performed by: PHYSICIAN ASSISTANT

## 2023-09-18 PROCEDURE — 90471 IMMUNIZATION ADMIN: CPT | Performed by: PHYSICIAN ASSISTANT

## 2023-09-18 PROCEDURE — 80061 LIPID PANEL: CPT | Performed by: PHYSICIAN ASSISTANT

## 2023-09-18 PROCEDURE — 90746 HEPB VACCINE 3 DOSE ADULT IM: CPT | Performed by: PHYSICIAN ASSISTANT

## 2023-09-18 PROCEDURE — 90472 IMMUNIZATION ADMIN EACH ADD: CPT | Performed by: PHYSICIAN ASSISTANT

## 2023-09-18 PROCEDURE — 90686 IIV4 VACC NO PRSV 0.5 ML IM: CPT | Performed by: PHYSICIAN ASSISTANT

## 2023-09-18 RX ORDER — SUMATRIPTAN 100 MG/1
TABLET, FILM COATED ORAL
Qty: 9 TABLET | Refills: 4 | Status: SHIPPED | OUTPATIENT
Start: 2023-09-18 | End: 2024-06-30

## 2023-09-18 RX ORDER — MONTELUKAST SODIUM 10 MG/1
10 TABLET ORAL AT BEDTIME
Qty: 90 TABLET | Refills: 3 | Status: SHIPPED | OUTPATIENT
Start: 2023-09-18 | End: 2024-09-23

## 2023-09-18 ASSESSMENT — ENCOUNTER SYMPTOMS
CONSTIPATION: 0
WEAKNESS: 0
NAUSEA: 0
NERVOUS/ANXIOUS: 0
DIARRHEA: 0
FREQUENCY: 0
HEMATOCHEZIA: 0
BREAST MASS: 0
DIZZINESS: 0
ABDOMINAL PAIN: 0
SORE THROAT: 0
EYE PAIN: 0
CHILLS: 0
FEVER: 0
HEADACHES: 0
HEARTBURN: 0
MYALGIAS: 0
ARTHRALGIAS: 0
JOINT SWELLING: 0
COUGH: 0
PARESTHESIAS: 0
PALPITATIONS: 0
HEMATURIA: 0
SHORTNESS OF BREATH: 0
DYSURIA: 0

## 2023-09-18 ASSESSMENT — PAIN SCALES - GENERAL: PAINLEVEL: NO PAIN (0)

## 2023-09-18 NOTE — PROGRESS NOTES
SUBJECTIVE:   CC: Adri is an 37 year old who presents for preventive health visit.       9/18/2023     8:12 AM   Additional Questions   Roomed by Jillian   Accompanied by self         9/18/2023     8:12 AM   Patient Reported Additional Medications   Patient reports taking the following new medications NA       Healthy Habits:     Getting at least 3 servings of Calcium per day:  Yes    Bi-annual eye exam:  Yes    Dental care twice a year:  NO    Sleep apnea or symptoms of sleep apnea:  None    Diet:  Regular (no restrictions)    Frequency of exercise:  2-3 days/week    Duration of exercise:  15-30 minutes    Taking medications regularly:  Yes    Barriers to taking medications:  None    Medication side effects:  None    Additional concerns today:  Yes    - abdominal pain w/ bending over that is uncomfortable  - under L costal border  - is relieved up straightening  - began noticing it after her daughter was born 2 years ago  - breastfeeding  - asthma and headaches are controlled well on medications      Asthma Follow-Up    Was ACT completed today?  No    Do you have a cough?  No  Are you experiencing any wheezing in your chest?  No  Do you have any shortness of breath?  No   How often are you using a short-acting (rescue) inhaler or nebulizer, such as Albuterol?  Never  How many days per week do you miss taking your asthma controller medication?  I do not have an asthma controller medication  Please describe any recent triggers for your asthma: None  Have you had any Emergency Room Visits, Urgent Care Visits, or Hospital Admissions since your last office visit?  No      Social History     Tobacco Use    Smoking status: Never    Smokeless tobacco: Never   Substance Use Topics    Alcohol use: Not Currently     Comment: occ           9/18/2023    12:50 AM   Alcohol Use   Prescreen: >3 drinks/day or >7 drinks/week? No     Reviewed orders with patient.  Reviewed health maintenance and updated orders accordingly -  Yes  Lab work is in process  Labs reviewed in EPIC  BP Readings from Last 3 Encounters:   09/18/23 104/74   08/15/23 119/81   04/03/23 110/66    Wt Readings from Last 3 Encounters:   09/18/23 68.5 kg (151 lb)   08/15/23 68.9 kg (152 lb)   04/03/23 66.7 kg (147 lb)                    Breast Cancer Screening:    FHS-7:       7/11/2022     4:29 PM 9/18/2023    12:54 AM   Breast CA Risk Assessment (FHS-7)   Did any of your first-degree relatives have breast or ovarian cancer? No No   Did any of your relatives have bilateral breast cancer? Yes No   Did any man in your family have breast cancer? No No   Did any woman in your family have breast and ovarian cancer? Yes No   Did any woman in your family have breast cancer before age 50 y? Yes No   Do you have 2 or more relatives with breast and/or ovarian cancer? No No   Do you have 2 or more relatives with breast and/or bowel cancer? No No       Patient under 40 years of age: Routine Mammogram Screening not recommended.   Pertinent mammograms are reviewed under the imaging tab.    History of abnormal Pap smear: NO - age 30-65 PAP every 5 years with negative HPV co-testing recommended      Latest Ref Rng & Units 1/3/2022     2:17 PM 9/28/2017     1:09 PM 9/28/2017     1:00 PM   PAP / HPV   PAP  Negative for Intraepithelial Lesion or Malignancy (NILM)      PAP (Historical)   NIL     HPV 16 DNA Negative Negative   Negative    HPV 18 DNA Negative Negative   Negative    Other HR HPV Negative Negative   Negative      Reviewed and updated as needed this visit by clinical staff   Tobacco  Allergies  Meds  Problems  Med Hx  Surg Hx  Fam Hx          Reviewed and updated as needed this visit by Provider   Tobacco  Allergies  Meds  Problems  Med Hx  Surg Hx  Fam Hx         Past Medical History:   Diagnosis Date    Acne clear    sees derm Dr. Raygoza    Allergic rhinitis, cause unspecified     dr. joel GBISON (allergic rhinitis)     Asthma 1997    High cholesterol      "Hypertension 10/09/2014    High blood pressure again noted at Urgent Care 12/13/14    Melanoma of scalp or neck (H)     Motion sickness 04/30/2013    Spina bifida (H) 2006    seen on xray when in college    Uncomplicated asthma       Past Surgical History:   Procedure Laterality Date    NO HISTORY OF SURGERY         Review of Systems   Constitutional:  Negative for chills and fever.   HENT:  Negative for congestion, ear pain, hearing loss and sore throat.    Eyes:  Negative for pain and visual disturbance.   Respiratory:  Negative for cough and shortness of breath.    Cardiovascular:  Negative for chest pain, palpitations and peripheral edema.   Gastrointestinal:  Negative for abdominal pain, constipation, diarrhea, heartburn, hematochezia and nausea.   Breasts:  Negative for tenderness, breast mass and discharge.   Genitourinary:  Negative for dysuria, frequency, genital sores, hematuria, pelvic pain, urgency, vaginal bleeding and vaginal discharge.   Musculoskeletal:  Negative for arthralgias, joint swelling and myalgias.   Skin:  Negative for rash.   Neurological:  Negative for dizziness, weakness, headaches and paresthesias.   Psychiatric/Behavioral:  Negative for mood changes. The patient is not nervous/anxious.           OBJECTIVE:   /74   Pulse 70   Temp 97.7  F (36.5  C) (Temporal)   Resp 20   Ht 1.655 m (5' 5.16\")   Wt 68.5 kg (151 lb)   LMP 09/14/2023 (Exact Date)   SpO2 97%   Breastfeeding Yes   BMI 25.01 kg/m    Physical Exam  Constitutional:       General: She is not in acute distress.     Appearance: Normal appearance. She is not ill-appearing.   HENT:      Right Ear: Tympanic membrane, ear canal and external ear normal.      Left Ear: Tympanic membrane, ear canal and external ear normal.      Mouth/Throat:      Mouth: Mucous membranes are moist.      Pharynx: No posterior oropharyngeal erythema.   Eyes:      Extraocular Movements: Extraocular movements intact.      Conjunctiva/sclera: " Conjunctivae normal.      Pupils: Pupils are equal, round, and reactive to light.   Cardiovascular:      Rate and Rhythm: Normal rate and regular rhythm.      Pulses: Normal pulses.      Heart sounds: Normal heart sounds. No murmur heard.     No friction rub. No gallop.   Pulmonary:      Breath sounds: Normal breath sounds. No wheezing, rhonchi or rales.   Abdominal:      General: Bowel sounds are normal. There is no distension.      Palpations: Abdomen is soft. There is no mass.      Tenderness: There is no abdominal tenderness.      Hernia: No hernia is present.   Musculoskeletal:         General: No swelling, deformity or signs of injury. Normal range of motion.   Lymphadenopathy:      Cervical: No cervical adenopathy.   Neurological:      Mental Status: She is alert and oriented to person, place, and time.   Psychiatric:         Mood and Affect: Mood normal.         Behavior: Behavior normal.         Thought Content: Thought content normal.         Judgment: Judgment normal.     Pt denied breast exam today due to breastfeeding      Diagnostic Test Results:  Labs reviewed in Epic  See orders pending in Epic     ASSESSMENT/PLAN:       ICD-10-CM    1. Routine general medical examination at a health care facility  Z00.00       2. Migraine without status migrainosus, not intractable, unspecified migraine type  G43.909 SUMAtriptan (IMITREX) 100 MG tablet      3. Seasonal allergic rhinitis due to pollen  J30.1 montelukast (SINGULAIR) 10 MG tablet      4. Screening for lipid disorders  Z13.220 Lipid panel reflex to direct LDL Fasting     Lipid panel reflex to direct LDL Fasting          Shannon is a 38yo healthly female presenting today for her annual physical examination with additional concerns regarding abdominal pain when bending over. The pain and discomfort is relieved upon straightening and is localized to under the left costal margin. She began noticing this intermittently since her daughter was born (11/21).  Physical examination was unremarkable making her complaint likely a benign finding. She was advised of this as reassurance.    Additionally, her lipids have not been checked since the beginning of 2021 so I would like to recheck those today. Will follow results and relay findings.     - Influenza and Hepatitis B booster vaccinations administered today.    - Reviewed medication use and side effects, refilled     Patient has been advised of split billing requirements and indicates understanding: Yes      COUNSELING:  Reviewed preventive health counseling, as reflected in patient instructions  Special attention given to:        Regular exercise       Healthy diet/nutrition       Immunizations  Vaccinated for: Hepatitis B and Influenza      She reports that she has never smoked. She has never used smokeless tobacco.    Review of the result(s) of each unique test - See list        1/3/22 - PAP        1/25/21 - Lipid, Glucose   Diagnosis or treatment significantly limited by social determinants of health - None     25 minutes spent on the date of the encounter doing chart review, history and exam, documentation and further activities as noted above    I, Javier Kc PA-C,  was present with the PA student who participated in the service and in the documentation of the note.  I have verified the history and personally performed the physical exam and medical decision making.  I agree with the assessment and plan of care as documented in the note.     VANITA ParisS   [facility]     Naila Kc PA-C  [facility]

## 2023-09-18 NOTE — PROGRESS NOTES
Prior to immunization administration, verified patients identity using patient s name and date of birth. Please see Immunization Activity for additional information.     Screening Questionnaire for Adult Immunization    Are you sick today?   No   Do you have allergies to medications, food, a vaccine component or latex?   No   Have you ever had a serious reaction after receiving a vaccination?   No   Do you have a long-term health problem with heart, lung, kidney, or metabolic disease (e.g., diabetes), asthma, a blood disorder, no spleen, complement component deficiency, a cochlear implant, or a spinal fluid leak?  Are you on long-term aspirin therapy?   Yes: mild asthma   Do you have cancer, leukemia, HIV/AIDS, or any other immune system problem?   No   Do you have a parent, brother, or sister with an immune system problem?   No   In the past 3 months, have you taken medications that affect  your immune system, such as prednisone, other steroids, or anticancer drugs; drugs for the treatment of rheumatoid arthritis, Crohn s disease, or psoriasis; or have you had radiation treatments?   No   Have you had a seizure, or a brain or other nervous system problem?   No   During the past year, have you received a transfusion of blood or blood    products, or been given immune (gamma) globulin or antiviral drug?   No   For women: Are you pregnant or is there a chance you could become       pregnant during the next month?   No   Have you received any vaccinations in the past 4 weeks?   No     Immunization questionnaire answers were all negative.      Patient instructed to remain in clinic for 15 minutes afterwards, and to report any adverse reactions.     Screening performed by Idania Nava CMA on 9/18/2023 at 8:17 AM.

## 2024-06-30 ENCOUNTER — MYC REFILL (OUTPATIENT)
Dept: FAMILY MEDICINE | Facility: OTHER | Age: 39
End: 2024-06-30
Payer: COMMERCIAL

## 2024-06-30 DIAGNOSIS — G43.909 MIGRAINE WITHOUT STATUS MIGRAINOSUS, NOT INTRACTABLE, UNSPECIFIED MIGRAINE TYPE: ICD-10-CM

## 2024-07-01 RX ORDER — SUMATRIPTAN 100 MG/1
TABLET, FILM COATED ORAL
Qty: 9 TABLET | Refills: 0 | OUTPATIENT
Start: 2024-07-01

## 2024-07-01 RX ORDER — SUMATRIPTAN 100 MG/1
TABLET, FILM COATED ORAL
Qty: 18 TABLET | Refills: 4 | Status: SHIPPED | OUTPATIENT
Start: 2024-07-01 | End: 2024-09-23

## 2024-09-22 ASSESSMENT — SOCIAL DETERMINANTS OF HEALTH (SDOH): HOW OFTEN DO YOU GET TOGETHER WITH FRIENDS OR RELATIVES?: ONCE A WEEK

## 2024-09-23 ENCOUNTER — OFFICE VISIT (OUTPATIENT)
Dept: FAMILY MEDICINE | Facility: OTHER | Age: 39
End: 2024-09-23
Payer: COMMERCIAL

## 2024-09-23 VITALS
SYSTOLIC BLOOD PRESSURE: 115 MMHG | WEIGHT: 162.5 LBS | TEMPERATURE: 97.8 F | HEART RATE: 85 BPM | RESPIRATION RATE: 11 BRPM | BODY MASS INDEX: 27.07 KG/M2 | DIASTOLIC BLOOD PRESSURE: 77 MMHG | HEIGHT: 65 IN | OXYGEN SATURATION: 99 %

## 2024-09-23 DIAGNOSIS — Z13.220 SCREENING FOR LIPOID DISORDERS: ICD-10-CM

## 2024-09-23 DIAGNOSIS — G43.909 MIGRAINE WITHOUT STATUS MIGRAINOSUS, NOT INTRACTABLE, UNSPECIFIED MIGRAINE TYPE: ICD-10-CM

## 2024-09-23 DIAGNOSIS — J30.1 SEASONAL ALLERGIC RHINITIS DUE TO POLLEN: ICD-10-CM

## 2024-09-23 DIAGNOSIS — Z13.1 SCREENING FOR DIABETES MELLITUS: ICD-10-CM

## 2024-09-23 DIAGNOSIS — J01.90 ACUTE SINUSITIS WITH SYMPTOMS > 10 DAYS: ICD-10-CM

## 2024-09-23 DIAGNOSIS — Z00.00 ROUTINE GENERAL MEDICAL EXAMINATION AT A HEALTH CARE FACILITY: Primary | ICD-10-CM

## 2024-09-23 LAB
CHOLEST SERPL-MCNC: 163 MG/DL
FASTING STATUS PATIENT QL REPORTED: YES
FASTING STATUS PATIENT QL REPORTED: YES
GLUCOSE SERPL-MCNC: 92 MG/DL (ref 70–99)
HDLC SERPL-MCNC: 59 MG/DL
LDLC SERPL CALC-MCNC: 91 MG/DL
NONHDLC SERPL-MCNC: 104 MG/DL
TRIGL SERPL-MCNC: 66 MG/DL

## 2024-09-23 PROCEDURE — 99395 PREV VISIT EST AGE 18-39: CPT | Performed by: PHYSICIAN ASSISTANT

## 2024-09-23 PROCEDURE — 99213 OFFICE O/P EST LOW 20 MIN: CPT | Mod: 25 | Performed by: PHYSICIAN ASSISTANT

## 2024-09-23 PROCEDURE — 80061 LIPID PANEL: CPT | Performed by: PHYSICIAN ASSISTANT

## 2024-09-23 PROCEDURE — 36415 COLL VENOUS BLD VENIPUNCTURE: CPT | Performed by: PHYSICIAN ASSISTANT

## 2024-09-23 PROCEDURE — 82947 ASSAY GLUCOSE BLOOD QUANT: CPT | Performed by: PHYSICIAN ASSISTANT

## 2024-09-23 RX ORDER — AZITHROMYCIN 250 MG/1
TABLET, FILM COATED ORAL
Qty: 6 TABLET | Refills: 0 | Status: SHIPPED | OUTPATIENT
Start: 2024-09-23

## 2024-09-23 RX ORDER — MONTELUKAST SODIUM 10 MG/1
10 TABLET ORAL AT BEDTIME
Qty: 90 TABLET | Refills: 3 | Status: SHIPPED | OUTPATIENT
Start: 2024-09-23

## 2024-09-23 RX ORDER — SUMATRIPTAN 100 MG/1
TABLET, FILM COATED ORAL
Qty: 18 TABLET | Refills: 4 | Status: SHIPPED | OUTPATIENT
Start: 2024-09-23

## 2024-09-23 ASSESSMENT — PAIN SCALES - GENERAL: PAINLEVEL: NO PAIN (0)

## 2024-09-23 NOTE — PATIENT INSTRUCTIONS
Patient Education   Preventive Care Advice   This is general advice given by our system to help you stay healthy. However, your care team may have specific advice just for you. Please talk to your care team about your preventive care needs.  Nutrition  Eat 5 or more servings of fruits and vegetables each day.  Try wheat bread, brown rice and whole grain pasta (instead of white bread, rice, and pasta).  Get enough calcium and vitamin D. Check the label on foods and aim for 100% of the RDA (recommended daily allowance).  Lifestyle  Exercise at least 150 minutes each week  (30 minutes a day, 5 days a week).  Do muscle strengthening activities 2 days a week. These help control your weight and prevent disease.  No smoking.  Wear sunscreen to prevent skin cancer.  Have a dental exam and cleaning every 6 months.  Yearly exams  See your health care team every year to talk about:  Any changes in your health.  Any medicines your care team has prescribed.  Preventive care, family planning, and ways to prevent chronic diseases.  Shots (vaccines)   HPV shots (up to age 26), if you've never had them before.  Hepatitis B shots (up to age 59), if you've never had them before.  COVID-19 shot: Get this shot when it's due.  Flu shot: Get a flu shot every year.  Tetanus shot: Get a tetanus shot every 10 years.  Pneumococcal, hepatitis A, and RSV shots: Ask your care team if you need these based on your risk.  Shingles shot (for age 50 and up)  General health tests  Diabetes screening:  Starting at age 35, Get screened for diabetes at least every 3 years.  If you are younger than age 35, ask your care team if you should be screened for diabetes.  Cholesterol test: At age 39, start having a cholesterol test every 5 years, or more often if advised.  Bone density scan (DEXA): At age 50, ask your care team if you should have this scan for osteoporosis (brittle bones).  Hepatitis C: Get tested at least once in your life.  STIs (sexually  transmitted infections)  Before age 24: Ask your care team if you should be screened for STIs.  After age 24: Get screened for STIs if you're at risk. You are at risk for STIs (including HIV) if:  You are sexually active with more than one person.  You don't use condoms every time.  You or a partner was diagnosed with a sexually transmitted infection.  If you are at risk for HIV, ask about PrEP medicine to prevent HIV.  Get tested for HIV at least once in your life, whether you are at risk for HIV or not.  Cancer screening tests  Cervical cancer screening: If you have a cervix, begin getting regular cervical cancer screening tests starting at age 21.  Breast cancer scan (mammogram): If you've ever had breasts, begin having regular mammograms starting at age 40. This is a scan to check for breast cancer.  Colon cancer screening: It is important to start screening for colon cancer at age 45.  Have a colonoscopy test every 10 years (or more often if you're at risk) Or, ask your provider about stool tests like a FIT test every year or Cologuard test every 3 years.  To learn more about your testing options, visit:   .  For help making a decision, visit:   https://bit.ly/yt37894.  Prostate cancer screening test: If you have a prostate, ask your care team if a prostate cancer screening test (PSA) at age 55 is right for you.  Lung cancer screening: If you are a current or former smoker ages 50 to 80, ask your care team if ongoing lung cancer screenings are right for you.  For informational purposes only. Not to replace the advice of your health care provider. Copyright   2023 [employer]. All rights reserved. Clinically reviewed by the [facility] Transitions Program. [employer] 403617 - REV 01/24.

## 2024-09-23 NOTE — PROGRESS NOTES
"Preventive Care Visit  [facility]  Naila Kc PA-C, Family Medicine  Sep 23, 2024     SUBJECTIVE:   Adri is a 38 year old, presenting for the following:  Annual Visit        9/23/2024     8:38 AM   Additional Questions   Roomed by kathleen   Accompanied by self         9/23/2024     8:38 AM   Patient Reported Additional Medications   Patient reports taking the following new medications zyrtec     History of Present Illness       Headaches:   Since the patient's last clinic visit, headaches are: no change  The patient is getting headaches:  3-4 days a month around my period  She is able to do normal daily activities when she has a migraine.  The patient is taking the following rescue/relief medications:  Sumatriptan (Imitrex)   Patient states \"I get some relief\" from the rescue/relief medications.   The patient is taking the following medications to prevent migraines:  No medications to prevent migraines  In the past 4 weeks, the patient has gone to an Urgent Care or Emergency Room 0 times times due to headaches.       Today's PHQ-2 Score:       9/22/2024     7:15 PM   PHQ-2 ( 1999 Pf[employer])   Q1: Little interest or pleasure in doing things 0   Q2: Feeling down, depressed or hopeless 0   PHQ-2 Score 0   Q1: Little interest or pleasure in doing things Not at all   Q2: Feeling down, depressed or hopeless Not at all   PHQ-2 Score 0           Social History     Tobacco Use    Smoking status: Never    Smokeless tobacco: Never   Substance Use Topics    Alcohol use: Yes     Comment: occ             9/22/2024     7:13 PM   Alcohol Use   Prescreen: >3 drinks/day or >7 drinks/week? No     Reviewed orders with patient.  Reviewed health maintenance and updated orders accordingly - Yes    Lab work is in process  Labs reviewed in EPIC  BP Readings from Last 3 Encounters:   09/23/24 115/77   09/18/23 104/74   08/15/23 119/81    Wt Readings from Last 3 Encounters:   09/23/24 162 lb 8 oz (73.7 " "kg)   09/18/23 151 lb (68.5 kg)   08/15/23 152 lb (68.9 kg)           Breast Cancer Screening:    FHS-7:       7/11/2022     4:29 PM 9/18/2023    12:54 AM   Breast CA Risk Assessment (FHS-7)   Did any of your first-degree relatives have breast or ovarian cancer? No No   Did any of your relatives have bilateral breast cancer? Yes No   Did any man in your family have breast cancer? No No   Did any woman in your family have breast and ovarian cancer? Yes No   Did any woman in your family have breast cancer before age 50 y? Yes No   Do you have 2 or more relatives with breast and/or ovarian cancer? No No   Do you have 2 or more relatives with breast and/or bowel cancer? No No       {AMB Mammogram Decision Support (Optional) :310562}    Pertinent mammograms are reviewed under the imaging tab.      History of abnormal Pap smear: { :972594}        Latest Ref Rng & Units 1/3/2022     2:17 PM 9/28/2017     1:09 PM 9/28/2017     1:00 PM   PAP / HPV   PAP  Negative for Intraepithelial Lesion or Malignancy (NILM)      PAP (Historical)   NIL     HPV 16 DNA Negative Negative   Negative    HPV 18 DNA Negative Negative   Negative    Other HR HPV Negative Negative   Negative            Latest Ref Rng & Units 1/3/2022     2:17 PM 9/28/2017     1:09 PM 9/28/2017     1:00 PM   PAP / HPV   PAP  Negative for Intraepithelial Lesion or Malignancy (NILM)      PAP (Historical)   NIL     HPV 16 DNA Negative Negative   Negative    HPV 18 DNA Negative Negative   Negative    Other HR HPV Negative Negative   Negative      Reviewed and updated as needed this visit by clinical staff   Tobacco  Allergies  Meds              Reviewed and updated as needed this visit by Provider                  {HISTORY OPTIONS (Optional):980918}  Review of Systems  {ROS Picklists (Optional):372616}    OBJECTIVE:   LMP 09/22/2024 (Exact Date)    Estimated body mass index is 25.01 kg/m  as calculated from the following:    Height as of 9/18/23: 5' 5.16\" (1.655 m).   " " Weight as of 9/18/23: 151 lb (68.5 kg).  Physical Exam  {Exam List (Optional):411478}    {Diagnostic Test Results (Optional):677425}    ASSESSMENT/PLAN:   {Diag Picklist:047988}    {Patient advised of split billing (Optional):458961}      Counseling  {FEMALE COUNSELING MESSAGES:912257::\"Reviewed preventive health counseling, as reflected in patient instructions\"}        She reports that she has never smoked. She has never used smokeless tobacco.      {Counseling Resources  [place] Preventive Services Task Force  Cholesterol Screening  Health diet/nutrition  Pooled Cohorts Equation Calculator  USDA's MyPlate  ASA Prophylaxis  Lung CA Screening  Osteoporosis prevention/bone health :477325}  {Breast Cancer Risk Calculator  BRCA-Related Cancer Risk Assessment FHS-7 Tool :332081}  Signed Electronically by: Naila Kc PA-C  "

## 2024-09-24 NOTE — PROGRESS NOTES
"Preventive Care Visit  [facility]  Naila MACHADOJaxson Kc PA-C, Family Medicine  Sep 23, 2024    Assessment & Plan     ICD-10-CM    1. Routine general medical examination at a health care facility  Z00.00       2. Screening for diabetes mellitus  Z13.1 Glucose     Glucose      3. Screening for lipoid disorders  Z13.220 Lipid panel reflex to direct LDL Fasting     Lipid panel reflex to direct LDL Fasting      4. Seasonal allergic rhinitis due to pollen  J30.1 montelukast (SINGULAIR) 10 MG tablet      5. Migraine without status migrainosus, not intractable, unspecified migraine type  G43.909 SUMAtriptan (IMITREX) 100 MG tablet      6. Acute sinusitis with symptoms > 10 days  J01.90 azithromycin (ZITHROMAX) 250 MG tablet     OFFICE/OUTPT VISIT,HENRIETTA MCGINNIS III          Patient has been advised of split billing requirements and indicates understanding: Yes    BMI  Estimated body mass index is 27.04 kg/m  as calculated from the following:    Height as of this encounter: 1.651 m (5' 5\").    Weight as of this encounter: 73.7 kg (162 lb 8 oz).   Weight management plan: Discussed healthy diet and exercise guidelines    Counseling  Appropriate preventive services were addressed with this patient via screening, questionnaire, or discussion as appropriate for fall prevention, nutrition, physical activity, Tobacco-use cessation, social engagement, weight loss and cognition.  Checklist reviewing preventive services available has been given to the patient.  Reviewed patient's diet, addressing concerns and/or questions.   She is at risk for lack of exercise and has been provided with information to increase physical activity for the benefit of her well-being.   The patient was instructed to see the dentist every 6 months.     - Will update fasting labs today     - Chronic issues, stable on current regimen      Reviewed medication use and side effects, refilled as needed     - Sinus infection      Due to " "exam findings and duration, recommend treatment      PCN allergy, has tolerated Zpack in the past      Discussed antibiotic use, duration, and side effects     Encouraged rest and fluids, recommend nasal saline       Review of the result(s) of each unique test - See list          9/18/23 - Lipid       1/3/22 - PAP   Diagnosis or treatment significantly limited by social determinants of health - none     20 minutes spent on the date of the encounter doing chart review, history and exam, documentation and further activities as noted above    The patient indicates understanding of these issues and agrees with the plan.    Follow up: 1 year or PRN     Javier Torres-MARY Dooley  [facility]       Subjective   Adri is a 38 year old, presenting for the following:  Annual Visit        9/23/2024     8:38 AM   Additional Questions   Roomed by kathleen   Accompanied by self         9/23/2024     8:38 AM   Patient Reported Additional Medications   Patient reports taking the following new medications zyrte        Health Care Directive  Patient does not have a Health Care Directive or Living Will: Discussed advance care planning with patient; however, patient declined at this time.    History of Present Illness       Headaches:   Since the patient's last clinic visit, headaches are: no change  The patient is getting headaches:  3-4 days a month around my period  She is able to do normal daily activities when she has a migraine.  The patient is taking the following rescue/relief medications:  Sumatriptan (Imitrex)   Patient states \"I get some relief\" from the rescue/relief medications.   The patient is taking the following medications to prevent migraines:  No medications to prevent migraines  In the past 4 weeks, the patient has gone to an Urgent Care or Emergency Room 0 times times due to headaches.       Thinks has a sinus infection   - Got a little but came back  - Been about 2 weeks           " 9/22/2024   General Health   How would you rate your overall physical health? Good   Feel stress (tense, anxious, or unable to sleep) Not at all            9/22/2024   Nutrition   Three or more servings of calcium each day? Yes   Diet: Regular (no restrictions)   How many servings of fruit and vegetables per day? (!) 2-3   How many sweetened beverages each day? 0-1            9/22/2024   Exercise   Days per week of moderate/strenous exercise 2 days   Average minutes spent exercising at this level 20 min      (!) EXERCISE CONCERN      9/22/2024   Social Factors   Frequency of gathering with friends or relatives Once a week   Worry food won't last until get money to buy more No   Food not last or not have enough money for food? No   Do you have housing? (Housing is defined as stable permanent housing and does not include staying ouside in a car, in a tent, in an abandoned building, in an overnight shelter, or couch-surfing.) Yes   Are you worried about losing your housing? No   Lack of transportation? No   Unable to get utilities (heat,electricity)? No            9/22/2024   Dental   Dentist two times every year? (!) NO            9/22/2024   TB Screening   Were you born outside of the [place]? No            Today's PHQ-2 Score:       9/22/2024     7:15 PM   PHQ-2 ( 1999 [employer])   Q1: Little interest or pleasure in doing things 0   Q2: Feeling down, depressed or hopeless 0   PHQ-2 Score 0   Q1: Little interest or pleasure in doing things Not at all   Q2: Feeling down, depressed or hopeless Not at all   PHQ-2 Score 0           9/22/2024   Substance Use   Alcohol more than 3/day or more than 7/wk No   Do you use any other substances recreationally? No        Social History     Tobacco Use    Smoking status: Never    Smokeless tobacco: Never   Vaping Use    Vaping status: Never Used   Substance Use Topics    Alcohol use: Yes     Comment: occ    Drug use: No         9/18/2023   LAST FHS-7 RESULTS   1st degree relative breast  or ovarian cancer No   Any relative bilateral breast cancer No   Any male have breast cancer No   Any ONE woman have BOTH breast AND ovarian cancer No   Any woman with breast cancer before 50yrs No   2 or more relatives with breast AND/OR ovarian cancer No   2 or more relatives with breast AND/OR bowel cancer No      Mammogram Screening - Patient under 40 years of age: Routine Mammogram Screening not recommended.         9/22/2024   STI Screening   New sexual partner(s) since last STI/HIV test? No        History of abnormal Pap smear: No - age 30- 64 PAP with HPV every 5 years recommended        Latest Ref Rng & Units 1/3/2022     2:17 PM 9/28/2017     1:09 PM 9/28/2017     1:00 PM   PAP / HPV   PAP  Negative for Intraepithelial Lesion or Malignancy (NILM)      PAP (Historical)   NIL     HPV 16 DNA Negative Negative   Negative    HPV 18 DNA Negative Negative   Negative    Other HR HPV Negative Negative   Negative            9/22/2024   Contraception/Family Planning   Questions about contraception or family planning No           Reviewed and updated as needed this visit by Provider   Tobacco  Allergies  Meds  Problems  Med Hx  Surg Hx  Fam Hx            Past Medical History:   Diagnosis Date    Acne clear    sees derm Dr. Raygoza    Allergic rhinitis, cause unspecified     dr. joel GIBSON (allergic rhinitis)     Asthma 1997    High cholesterol     Hypertension 10/09/2014    High blood pressure again noted at Urgent Care 12/13/14    Melanoma of scalp or neck (H)     Motion sickness 04/30/2013    Spina bifida (H) 2006    seen on xray when in college    Uncomplicated asthma      Past Surgical History:   Procedure Laterality Date    NO HISTORY OF SURGERY       Lab work is in process  Labs reviewed in EPIC  BP Readings from Last 3 Encounters:   09/23/24 115/77   09/18/23 104/74   08/15/23 119/81    Wt Readings from Last 3 Encounters:   09/23/24 73.7 kg (162 lb 8 oz)   09/18/23 68.5 kg (151 lb)   08/15/23 68.9 kg  "(152 lb)            Review of Systems  Constitutional, neuro, ENT, endocrine, pulmonary, cardiac, gastrointestinal, genitourinary, musculoskeletal, integument and psychiatric systems are negative, except as otherwise noted.     Objective    Exam  /77   Pulse 85   Temp 97.8  F (36.6  C) (Temporal)   Resp 11   Ht 1.651 m (5' 5\")   Wt 73.7 kg (162 lb 8 oz)   LMP 09/22/2024 (Exact Date)   SpO2 99%   Breastfeeding No   BMI 27.04 kg/m     Estimated body mass index is 27.04 kg/m  as calculated from the following:    Height as of this encounter: 1.651 m (5' 5\").    Weight as of this encounter: 73.7 kg (162 lb 8 oz).    Physical Exam  Constitutional:       General: She is not in acute distress.     Appearance: She is well-developed. She is ill-appearing (mildly).   HENT:      Right Ear: External ear normal. A middle ear effusion is present. There is no impacted cerumen. Tympanic membrane is not injected, perforated, erythematous or bulging.      Left Ear: External ear normal. A middle ear effusion is present. There is no impacted cerumen. Tympanic membrane is not injected, perforated, erythematous or bulging.      Nose: Mucosal edema, congestion and rhinorrhea present.      Right Turbinates: Enlarged and swollen.      Left Turbinates: Enlarged and swollen.      Right Sinus: Maxillary sinus tenderness and frontal sinus tenderness present.      Left Sinus: Maxillary sinus tenderness and frontal sinus tenderness present.      Mouth/Throat:      Dentition: Normal dentition.      Tongue: No lesions. Tongue does not deviate from midline.      Palate: No lesions.      Pharynx: No pharyngeal swelling, oropharyngeal exudate or posterior oropharyngeal erythema.      Tonsils: No tonsillar exudate or tonsillar abscesses.   Eyes:      General: Lids are normal.         Right eye: No discharge.         Left eye: No discharge.      Conjunctiva/sclera: Conjunctivae normal.      Right eye: Right conjunctiva is not injected.     "  Left eye: Left conjunctiva is not injected.      Pupils: Pupils are equal, round, and reactive to light.   Neck:      Thyroid: No thyroid mass or thyromegaly.      Vascular: No JVD.      Trachea: Trachea normal. No tracheal deviation.   Cardiovascular:      Rate and Rhythm: Normal rate and regular rhythm.      Heart sounds: Normal heart sounds. No murmur heard.     No friction rub. No gallop.   Pulmonary:      Effort: Pulmonary effort is normal. No respiratory distress.      Breath sounds: Normal breath sounds. No stridor or decreased air movement. No wheezing, rhonchi or rales.   Abdominal:      General: Bowel sounds are normal. There is no distension.      Palpations: Abdomen is soft. There is no mass.      Tenderness: There is no abdominal tenderness. There is no guarding or rebound.      Hernia: No hernia is present.   Musculoskeletal:         General: Normal range of motion.      Cervical back: Normal range of motion and neck supple.   Lymphadenopathy:      Cervical: No cervical adenopathy.   Skin:     General: Skin is warm and dry.   Neurological:      Mental Status: She is alert and oriented to person, place, and time.   Psychiatric:         Behavior: Behavior normal.         Thought Content: Thought content normal.         Judgment: Judgment normal.     Breast exam declined   Pelvic not indicated     Diagnostics: reviewed in Epic, see orders pending in Epic       Signed Electronically by: Naila Kc PA-C  \

## 2024-09-27 DIAGNOSIS — G43.909 MIGRAINE WITHOUT STATUS MIGRAINOSUS, NOT INTRACTABLE, UNSPECIFIED MIGRAINE TYPE: ICD-10-CM

## 2024-09-27 RX ORDER — SUMATRIPTAN 100 MG/1
TABLET, FILM COATED ORAL
Qty: 18 TABLET | Refills: 4 | OUTPATIENT
Start: 2024-09-27

## 2025-07-30 NOTE — TELEPHONE ENCOUNTER
Headache since Saturday  Has trying ibuprofen and tylenol.  Doesn't help very well.  Slept ok last night.  Prior to that only getting hours of sleep.    No fever.  No vision changes.  No sensitivity to light.    Does have hx of headaches.  This episode is more intensity.   Having 60 oz or a little more.   Rates headache 4-5/10    Will have her send in EVisit and do a BP check in clinic.    PRATIMA FrostN, RN, PHN  [facility] ~ Registered Nurse  Clinic Triage ~ [place] [facility] [place]  November 22, 2021      Reason for Disposition    Unexplained headache that is present > 24 hours    Additional Information    Negative: Difficult to awaken or acting confused (e.g., disoriented, slurred speech)    Negative: Weakness of the face, arm or leg on one side of the body and new onset    Negative: Numbness of the face, arm or leg on one side of the body and new onset    Negative: Loss of speech or garbled speech and new onset    Negative: Passed out (i.e., fainted, collapsed and was not responding)    Negative: Sounds like a life-threatening emergency to the triager    Negative: Followed a head injury within last 3 days    Negative: Traumatic Brain Injury (TBI) is suspected    Negative: Sinus pain of forehead and yellow or green nasal discharge    Negative: Pregnant    Negative: Unable to walk without falling    Negative: Stiff neck (can't touch chin to chest)    Negative: Possibility of carbon monoxide exposure    Negative: SEVERE headache, states 'worst headache' of life    Negative: SEVERE headache, sudden-onset (i.e., reaching maximum intensity within seconds to 1 hour)    Negative: Severe pain in one eye    Negative: Loss of vision or double vision (Exception: same as prior migraines)    Negative: Patient sounds very sick or weak to the triager    Negative: Fever > 103 F (39.4 C)    Negative: Fever > 100.0 F (37.8 C) and has diabetes mellitus or a weak immune system (e.g., HIV positive, cancer chemotherapy, organ  transplant, splenectomy, chronic steroids)    Negative: SEVERE headache (e.g., excruciating) and has had severe headaches before    Negative: SEVERE headache and not relieved by pain meds    Negative: SEVERE headache and vomiting    Negative: SEVERE headache and fever    Negative: New headache and weak immune system (e.g., HIV positive, cancer chemo, splenectomy, organ transplant, chronic steroids)    Negative: Fever present > 3 days (72 hours)    Negative: Patient wants to be seen    Protocols used: HEADACHE-A-OH       [Follow-Up: _____] : a [unfilled] follow-up visit [FreeTextEntry1] : PsA, OA related pain